# Patient Record
Sex: FEMALE | Race: WHITE | NOT HISPANIC OR LATINO | Employment: FULL TIME | URBAN - METROPOLITAN AREA
[De-identification: names, ages, dates, MRNs, and addresses within clinical notes are randomized per-mention and may not be internally consistent; named-entity substitution may affect disease eponyms.]

---

## 2017-01-03 ENCOUNTER — TELEPHONE (OUTPATIENT)
Dept: MEDICAL GROUP | Facility: PHYSICIAN GROUP | Age: 27
End: 2017-01-03

## 2017-01-03 DIAGNOSIS — Z86.19 HISTORY OF CHICKENPOX: ICD-10-CM

## 2017-01-03 NOTE — TELEPHONE ENCOUNTER
1. Caller Name: Lisa Whaley                      Call Back Number: [phone] (home) [phone] (work)    2. Message: Patient needs titers done for school the letter would not work for school as proof of having the chicken pox    3. Patient approves office to leave a detailed voicemail/MyChart message: N\A

## 2017-01-04 ENCOUNTER — HOSPITAL ENCOUNTER (OUTPATIENT)
Dept: LAB | Facility: MEDICAL CENTER | Age: 27
End: 2017-01-04
Attending: FAMILY MEDICINE
Payer: COMMERCIAL

## 2017-01-04 DIAGNOSIS — D22.9 NUMEROUS MOLES: ICD-10-CM

## 2017-01-04 DIAGNOSIS — Z86.19 HISTORY OF CHICKENPOX: ICD-10-CM

## 2017-01-04 DIAGNOSIS — L91.8 SKIN TAG: ICD-10-CM

## 2017-01-04 DIAGNOSIS — K21.9 GASTROESOPHAGEAL REFLUX DISEASE, ESOPHAGITIS PRESENCE NOT SPECIFIED: ICD-10-CM

## 2017-01-04 DIAGNOSIS — E78.5 DYSLIPIDEMIA: ICD-10-CM

## 2017-01-04 DIAGNOSIS — R73.01 IMPAIRED FASTING BLOOD SUGAR: ICD-10-CM

## 2017-01-04 LAB
ALBUMIN SERPL BCP-MCNC: 4.3 G/DL (ref 3.2–4.9)
ALBUMIN/GLOB SERPL: 1.7 G/DL
ALP SERPL-CCNC: 84 U/L (ref 30–99)
ALT SERPL-CCNC: 29 U/L (ref 2–50)
ANION GAP SERPL CALC-SCNC: 9 MMOL/L (ref 0–11.9)
AST SERPL-CCNC: 14 U/L (ref 12–45)
BASOPHILS # BLD AUTO: 0.05 K/UL (ref 0–0.12)
BASOPHILS NFR BLD AUTO: 0.5 % (ref 0–1.8)
BILIRUB SERPL-MCNC: 0.4 MG/DL (ref 0.1–1.5)
BUN SERPL-MCNC: 9 MG/DL (ref 8–22)
CALCIUM SERPL-MCNC: 9.5 MG/DL (ref 8.5–10.5)
CHLORIDE SERPL-SCNC: 104 MMOL/L (ref 96–112)
CHOLEST SERPL-MCNC: 160 MG/DL (ref 100–199)
CO2 SERPL-SCNC: 24 MMOL/L (ref 20–33)
CREAT SERPL-MCNC: 0.6 MG/DL (ref 0.5–1.4)
EOSINOPHIL # BLD: 0.16 K/UL (ref 0–0.51)
EOSINOPHIL NFR BLD AUTO: 1.4 % (ref 0–6.9)
ERYTHROCYTE [DISTWIDTH] IN BLOOD BY AUTOMATED COUNT: 38.8 FL (ref 35.9–50)
EST. AVERAGE GLUCOSE BLD GHB EST-MCNC: 114 MG/DL
GLOBULIN SER CALC-MCNC: 2.6 G/DL (ref 1.9–3.5)
GLUCOSE SERPL-MCNC: 92 MG/DL (ref 65–99)
HBA1C MFR BLD: 5.6 % (ref 0–5.6)
HCT VFR BLD AUTO: 43.2 % (ref 37–47)
HDLC SERPL-MCNC: 43 MG/DL
HGB BLD-MCNC: 14.1 G/DL (ref 12–16)
IMM GRANULOCYTES # BLD AUTO: 0.07 K/UL (ref 0–0.11)
IMM GRANULOCYTES NFR BLD AUTO: 0.6 % (ref 0–0.9)
LDLC SERPL CALC-MCNC: 96 MG/DL
LYMPHOCYTES # BLD: 3.36 K/UL (ref 1–4.8)
LYMPHOCYTES NFR BLD AUTO: 30.3 % (ref 22–41)
MCH RBC QN AUTO: 28.5 PG (ref 27–33)
MCHC RBC AUTO-ENTMCNC: 32.6 G/DL (ref 33.6–35)
MCV RBC AUTO: 87.4 FL (ref 81.4–97.8)
MONOCYTES # BLD: 0.57 K/UL (ref 0–0.85)
MONOCYTES NFR BLD AUTO: 5.1 % (ref 0–13.4)
NEUTROPHILS # BLD: 6.87 K/UL (ref 2–7.15)
NEUTROPHILS NFR BLD AUTO: 62.1 % (ref 44–72)
NRBC # BLD AUTO: 0 K/UL
NRBC BLD-RTO: 0 /100 WBC
PLATELET # BLD AUTO: 407 K/UL (ref 164–446)
PMV BLD AUTO: 9.4 FL (ref 9–12.9)
POTASSIUM SERPL-SCNC: 3.7 MMOL/L (ref 3.6–5.5)
PROT SERPL-MCNC: 6.9 G/DL (ref 6–8.2)
RBC # BLD AUTO: 4.94 M/UL (ref 4.2–5.4)
SODIUM SERPL-SCNC: 137 MMOL/L (ref 135–145)
TRIGL SERPL-MCNC: 106 MG/DL (ref 0–149)
WBC # BLD AUTO: 11.1 K/UL (ref 4.8–10.8)

## 2017-01-04 PROCEDURE — 80053 COMPREHEN METABOLIC PANEL: CPT

## 2017-01-04 PROCEDURE — 36415 COLL VENOUS BLD VENIPUNCTURE: CPT

## 2017-01-04 PROCEDURE — 85025 COMPLETE CBC W/AUTO DIFF WBC: CPT

## 2017-01-04 PROCEDURE — 80061 LIPID PANEL: CPT

## 2017-01-04 PROCEDURE — 83036 HEMOGLOBIN GLYCOSYLATED A1C: CPT

## 2017-01-04 PROCEDURE — 86787 VARICELLA-ZOSTER ANTIBODY: CPT

## 2017-01-06 LAB — VZV IGG SER IA-ACNC: 2781 IV

## 2017-01-09 DIAGNOSIS — D72.829 LEUKOCYTOSIS, UNSPECIFIED TYPE: ICD-10-CM

## 2017-09-19 ENCOUNTER — HOSPITAL ENCOUNTER (OUTPATIENT)
Dept: LAB | Facility: MEDICAL CENTER | Age: 27
End: 2017-09-19
Payer: COMMERCIAL

## 2017-09-19 LAB
BDY FAT % MEASURED: 47.3 %
BP DIAS: 75 MMHG
BP SYS: 139 MMHG
CHOLEST SERPL-MCNC: 188 MG/DL (ref 100–199)
DIABETES HTDIA: NO
EVENT NAME HTEVT: NORMAL
FASTING HTFAS: YES
GLUCOSE SERPL-MCNC: 89 MG/DL (ref 65–99)
HDLC SERPL-MCNC: 49 MG/DL
HYPERTENSION HTHYP: NO
LDLC SERPL CALC-MCNC: 92 MG/DL
SCREENING LOC CITY HTCIT: NORMAL
SCREENING LOC STATE HTSTA: NORMAL
SCREENING LOCATION HTLOC: NORMAL
SMOKING HTSMO: NO
SUBSCRIBER ID HTSID: NORMAL
TRIGL SERPL-MCNC: 233 MG/DL (ref 0–149)

## 2017-09-19 PROCEDURE — 82947 ASSAY GLUCOSE BLOOD QUANT: CPT

## 2017-09-19 PROCEDURE — 36415 COLL VENOUS BLD VENIPUNCTURE: CPT

## 2017-09-19 PROCEDURE — S5190 WELLNESS ASSESSMENT BY NONPH: HCPCS

## 2017-09-19 PROCEDURE — 80061 LIPID PANEL: CPT

## 2017-09-26 ENCOUNTER — OFFICE VISIT (OUTPATIENT)
Dept: URGENT CARE | Facility: PHYSICIAN GROUP | Age: 27
End: 2017-09-26
Payer: COMMERCIAL

## 2017-09-26 ENCOUNTER — NON-PROVIDER VISIT (OUTPATIENT)
Dept: MEDICAL GROUP | Facility: PHYSICIAN GROUP | Age: 27
End: 2017-09-26
Payer: COMMERCIAL

## 2017-09-26 VITALS
BODY MASS INDEX: 47.98 KG/M2 | SYSTOLIC BLOOD PRESSURE: 138 MMHG | HEART RATE: 107 BPM | OXYGEN SATURATION: 94 % | HEIGHT: 65 IN | RESPIRATION RATE: 16 BRPM | DIASTOLIC BLOOD PRESSURE: 72 MMHG | WEIGHT: 288 LBS | TEMPERATURE: 97.2 F

## 2017-09-26 DIAGNOSIS — Z23 NEED FOR VACCINATION: ICD-10-CM

## 2017-09-26 DIAGNOSIS — H61.21 IMPACTED CERUMEN OF RIGHT EAR: ICD-10-CM

## 2017-09-26 PROCEDURE — 90686 IIV4 VACC NO PRSV 0.5 ML IM: CPT | Performed by: INTERNAL MEDICINE

## 2017-09-26 PROCEDURE — 69210 REMOVE IMPACTED EAR WAX UNI: CPT | Performed by: NURSE PRACTITIONER

## 2017-09-26 PROCEDURE — 99999 PR NO CHARGE: CPT | Performed by: NURSE PRACTITIONER

## 2017-09-26 PROCEDURE — 90471 IMMUNIZATION ADMIN: CPT | Performed by: INTERNAL MEDICINE

## 2017-09-26 ASSESSMENT — PAIN SCALES - GENERAL: PAINLEVEL: NO PAIN

## 2017-09-26 NOTE — PROGRESS NOTES
"Subjective:      Lisa Whaley is a 26 y.o. female who presents with Ear Fullness (Right ear hearing loss, x 2 months, anxiety)            HPI New problem. 26 year old female with hearing loss in right ear x 2 months. She denies dizziness, nausea or vomiting, states some mild pain noted. She had had some cold symptoms prior to this and has tried to clean with qtip. She also has had some episodes of pounding heart rate and she is concerned as healthy tracks revealed high triglycerides.  Allergies, medications and history reviewed by me today      ROS  Please see HPI       Objective:     /72   Pulse (!) 107   Temp 36.2 °C (97.2 °F)   Resp 16   Ht 1.651 m (5' 5\")   Wt (!) 130.6 kg (288 lb)   LMP 09/08/2017   SpO2 94%   Breastfeeding? No   BMI 47.93 kg/m²      Physical Exam   Constitutional: She is oriented to person, place, and time. She appears well-developed and well-nourished. No distress.   HENT:   Head: Normocephalic and atraumatic.   Right Ear: External ear and ear canal normal. Tympanic membrane is not injected and not perforated. No middle ear effusion.   Left Ear: External ear and ear canal normal. Tympanic membrane is not injected and not perforated.  No middle ear effusion.   Nose: No mucosal edema.   Mouth/Throat: No oropharyngeal exudate or posterior oropharyngeal erythema.   Right EAC with cerumen impaction.   Eyes: Conjunctivae are normal. Right eye exhibits no discharge. Left eye exhibits no discharge.   Neck: Normal range of motion. Neck supple.   Cardiovascular: Normal rate, regular rhythm and normal heart sounds.    No murmur heard.  Pulmonary/Chest: Effort normal and breath sounds normal. No respiratory distress.   Musculoskeletal: Normal range of motion.   Normal movement of all 4 extremities.   Lymphadenopathy:     She has no cervical adenopathy.        Right: No supraclavicular adenopathy present.        Left: No supraclavicular adenopathy present.   Neurological: She is " alert and oriented to person, place, and time. Gait normal.   Skin: Skin is warm and dry.   Psychiatric: She has a normal mood and affect. Her behavior is normal. Thought content normal.               Assessment/Plan:     1. Impacted cerumen of right ear  EAR IRRIGATION (MA ONLY)     Irrigated by MA, cerumen removed from eAC by curette by myself.  Follow up as needed.

## 2017-09-26 NOTE — NON-PROVIDER
"Lisa Whaley is a 26 y.o. female here for a non-provider visit for:   FLU    Reason for immunization: Annual Flu Vaccine  Immunization records indicate need for vaccine: Yes, confirmed with Epic  Minimum interval has been met for this vaccine: Yes  ABN completed: Yes    Order and dose verified by: ar  VIS Dated   was given to patient: Yes  All IAC Questionnaire questions were answered \"No.\"    Patient tolerated injection and no adverse effects were observed or reported: Yes    Pt scheduled for next dose in series: Not Indicated     "

## 2017-11-21 ENCOUNTER — NON-PROVIDER VISIT (OUTPATIENT)
Dept: URGENT CARE | Facility: PHYSICIAN GROUP | Age: 27
End: 2017-11-21

## 2017-11-21 DIAGNOSIS — Z11.1 ENCOUNTER FOR PPD TEST: ICD-10-CM

## 2017-11-21 PROCEDURE — 86580 TB INTRADERMAL TEST: CPT | Performed by: FAMILY MEDICINE

## 2017-11-21 NOTE — PROGRESS NOTES
Lisa Whaley is a 27 y.o. female here for a non-provider visit for PPD placement -- Step 1 of 2    Reason for PPD:  work requirement    1. TB evaluation questionnaire completed by patient? Yes      -  If any answers marked yes did you contact a provider prior to placing? Not Indicated  2.  Patient notified to return to clinic for reading on: 11/23/17-11/24/17  3.  PPD Placement documentation completed on TB evaluation questionnaire? Yes  4.  Location of TB evaluation questionnaire filed:

## 2017-11-24 ENCOUNTER — NON-PROVIDER VISIT (OUTPATIENT)
Dept: URGENT CARE | Facility: PHYSICIAN GROUP | Age: 27
End: 2017-11-24
Payer: COMMERCIAL

## 2017-11-24 LAB — TB WHEAL 3D P 5 TU DIAM: NORMAL MM

## 2018-06-14 ENCOUNTER — HOSPITAL ENCOUNTER (OUTPATIENT)
Facility: MEDICAL CENTER | Age: 28
End: 2018-06-14
Attending: FAMILY MEDICINE
Payer: COMMERCIAL

## 2018-06-14 ENCOUNTER — OFFICE VISIT (OUTPATIENT)
Dept: MEDICAL GROUP | Facility: PHYSICIAN GROUP | Age: 28
End: 2018-06-14
Payer: COMMERCIAL

## 2018-06-14 VITALS
BODY MASS INDEX: 47.2 KG/M2 | TEMPERATURE: 97.9 F | OXYGEN SATURATION: 93 % | HEART RATE: 84 BPM | DIASTOLIC BLOOD PRESSURE: 60 MMHG | WEIGHT: 283.29 LBS | SYSTOLIC BLOOD PRESSURE: 110 MMHG | HEIGHT: 65 IN

## 2018-06-14 DIAGNOSIS — Z00.00 ROUTINE PHYSICAL EXAMINATION: ICD-10-CM

## 2018-06-14 DIAGNOSIS — Z01.419 ENCOUNTER FOR ROUTINE GYNECOLOGICAL EXAMINATION WITH PAPANICOLAOU SMEAR OF CERVIX: ICD-10-CM

## 2018-06-14 DIAGNOSIS — E66.01 MORBID OBESITY WITH BMI OF 45.0-49.9, ADULT (HCC): ICD-10-CM

## 2018-06-14 LAB — CYTOLOGY REG CYTOL: NORMAL

## 2018-06-14 PROCEDURE — 99000 SPECIMEN HANDLING OFFICE-LAB: CPT | Performed by: FAMILY MEDICINE

## 2018-06-14 PROCEDURE — 88175 CYTOPATH C/V AUTO FLUID REDO: CPT

## 2018-06-14 PROCEDURE — 99395 PREV VISIT EST AGE 18-39: CPT | Performed by: FAMILY MEDICINE

## 2018-06-14 ASSESSMENT — PATIENT HEALTH QUESTIONNAIRE - PHQ9: CLINICAL INTERPRETATION OF PHQ2 SCORE: 0

## 2018-06-15 NOTE — PROGRESS NOTES
"Subjective:      Lisa Whaley is a 27 y.o. female who presents with Gynecologic Exam (pap)            HPI     Patient is here for routine GYN exam/Pap smear and physical exam.    Her last Pap smear in  and was normal. No history of abnormal Pap smears. She had Chlamydia years ago that was treated. She has been with the same partner for several years. She is  zero para zero. LMP 18. Last flu shot was in 2017. Last tdap was in .    I reviewed the following    Past Medical History:   Diagnosis Date   • Dysuria 2014   • No significant past medical history         History reviewed. No pertinent surgical history.    Allergies   Allergen Reactions   • Vicodin [Hydrocodone-Acetaminophen]        No current outpatient prescriptions on file.     No current facility-administered medications for this visit.         Family History   Problem Relation Age of Onset   • No Known Problems Mother    • Hypertension Father    • GI Father      GERD       Social History     Social History   • Marital status:      Spouse name: N/A   • Number of children: 0   • Years of education: N/A     Occupational History   • retail      Social History Main Topics   • Smoking status: Current Every Day Smoker     Packs/day: 0.10     Years: 1.00     Types: Cigarettes   • Smokeless tobacco: Never Used      Comment: previously occas, 2 cig/day x 1 yr   • Alcohol use 0.0 oz/week      Comment: occasional   • Drug use: No   • Sexual activity: Yes     Partners: Male     Other Topics Concern   • Not on file     Social History Narrative   • No narrative on file        ROS     As per history of present illness, the rest are negative.       Objective:     /60   Pulse 84   Temp 36.6 °C (97.9 °F)   Ht 1.651 m (5' 5\")   Wt (!) 128.5 kg (283 lb 4.7 oz)   SpO2 93%   BMI 47.14 kg/m²      Physical Exam   Constitutional: She is oriented to person, place, and time. She appears well-developed and well-nourished. " No distress.   HENT:   Head: Normocephalic and atraumatic.   Right Ear: External ear normal.   Left Ear: External ear normal.   Nose: Nose normal.   Mouth/Throat: Oropharynx is clear and moist. No oropharyngeal exudate.   Eyes: Conjunctivae and EOM are normal. Pupils are equal, round, and reactive to light. Right eye exhibits no discharge. Left eye exhibits no discharge. No scleral icterus.   Neck: Normal range of motion. Neck supple. No tracheal deviation present. No thyromegaly present.   Cardiovascular: Normal rate, regular rhythm and normal heart sounds.  Exam reveals no gallop.    No murmur heard.  Pulmonary/Chest: Effort normal and breath sounds normal. No stridor. No respiratory distress. She has no wheezes. She has no rales. Right breast exhibits no inverted nipple, no mass, no nipple discharge, no skin change and no tenderness. Left breast exhibits no inverted nipple, no mass, no nipple discharge, no skin change and no tenderness. Breasts are symmetrical.   Abdominal: Soft. Bowel sounds are normal. She exhibits no distension and no mass. There is no tenderness. There is no rebound and no guarding. Hernia confirmed negative in the right inguinal area and confirmed negative in the left inguinal area.   Genitourinary: Vagina normal and uterus normal. No breast tenderness, discharge or bleeding. Pelvic exam was performed with patient supine. No labial fusion. There is no rash, tenderness, lesion or injury on the right labia. There is no rash, tenderness, lesion or injury on the left labia. Uterus is not deviated, not enlarged, not fixed and not tender. Cervix exhibits no motion tenderness, no discharge and no friability. Right adnexum displays no mass, no tenderness and no fullness. Left adnexum displays no mass, no tenderness and no fullness. No erythema, tenderness or bleeding in the vagina. No foreign body in the vagina. No signs of injury around the vagina. No vaginal discharge found.   Musculoskeletal:  Normal range of motion. She exhibits no edema or tenderness.   Lymphadenopathy:     She has no cervical adenopathy.        Right: No inguinal adenopathy present.        Left: No inguinal adenopathy present.   Neurological: She is alert and oriented to person, place, and time. She displays normal reflexes. No cranial nerve deficit. She exhibits normal muscle tone. Coordination normal.   Skin: Skin is warm. No rash noted. She is not diaphoretic. No erythema. No pallor.   Psychiatric: She has a normal mood and affect. Her behavior is normal. Thought content normal.    Cervix very posteriorly located and hard to visualize.                Assessment/Plan:     1. Encounter for routine gynecological examination with Papanicolaou smear of cervix  Complete exam was done. Pap smear was done. Specimen was packaged and sent to the lab.  - THINPREP PAP ONLY; Future    2. Routine physical examination  We will do screening labs will be done together with her biometrics/healthy tracks.  - CBC WITH DIFFERENTIAL; Future  - COMP METABOLIC PANEL; Future    3. Morbid obesity with BMI of 45.0-49.9, adult ([facility])  Discussed diet, exercise and weight loss. Offered referral to our physicist and managed weight loss program and patient agrees to proceed. Referral placed. We will get blood work in addition to the biometrics that she will do through her work.  - Patient identified as having weight management issue.  Appropriate orders and counseling given.  - REFERRAL TO [facility] IMPROVEMENT PROGRAMS (HIP) Services Requested: Weight Management Program, Physician Medical Weight Management Program; Reason for Referral? BMI>30; Reason for Visit: Overweight/Obesity; Future  - CBC WITH DIFFERENTIAL; Future  - COMP METABOLIC PANEL; Future    Please note that this dictation was created using voice recognition software. I have worked with consultants from the vendor as well as technical experts from [employer] to optimize the interface. I have  made every reasonable attempt to correct obvious errors, but I expect that there are errors of grammar and possibly content I did not discover before finalizing the note.

## 2018-06-21 ENCOUNTER — TELEPHONE (OUTPATIENT)
Dept: MEDICAL GROUP | Facility: PHYSICIAN GROUP | Age: 28
End: 2018-06-21

## 2018-06-21 NOTE — TELEPHONE ENCOUNTER
----- Message from Shikha Saul M.D. sent at 6/20/2018 11:55 AM PDT -----  Please call patient. Pap smear came back atypical cells. These are not the normal cells that we see in the Pap smear. This is something that we need to recheck in 6 months to make sure that they don't persist or they don't progress to precancerous cells. She needs to make an appointment in 6 months for a repeat Pap smear.

## 2018-07-12 ENCOUNTER — HOSPITAL ENCOUNTER (OUTPATIENT)
Dept: LAB | Facility: MEDICAL CENTER | Age: 28
End: 2018-07-12
Attending: FAMILY MEDICINE
Payer: COMMERCIAL

## 2018-07-12 DIAGNOSIS — E66.01 MORBID OBESITY WITH BMI OF 45.0-49.9, ADULT (HCC): ICD-10-CM

## 2018-07-12 DIAGNOSIS — Z00.00 ROUTINE PHYSICAL EXAMINATION: ICD-10-CM

## 2018-07-12 LAB
ALBUMIN SERPL BCP-MCNC: 4.3 G/DL (ref 3.2–4.9)
ALBUMIN/GLOB SERPL: 1.7 G/DL
ALP SERPL-CCNC: 86 U/L (ref 30–99)
ALT SERPL-CCNC: 19 U/L (ref 2–50)
ANION GAP SERPL CALC-SCNC: 11 MMOL/L (ref 0–11.9)
AST SERPL-CCNC: 16 U/L (ref 12–45)
BASOPHILS # BLD AUTO: 0.6 % (ref 0–1.8)
BASOPHILS # BLD: 0.06 K/UL (ref 0–0.12)
BDY FAT % MEASURED: 45.9 %
BILIRUB SERPL-MCNC: 0.5 MG/DL (ref 0.1–1.5)
BP DIAS: 70 MMHG
BP SYS: 138 MMHG
BUN SERPL-MCNC: 7 MG/DL (ref 8–22)
CALCIUM SERPL-MCNC: 9.5 MG/DL (ref 8.5–10.5)
CHLORIDE SERPL-SCNC: 104 MMOL/L (ref 96–112)
CHOLEST SERPL-MCNC: 184 MG/DL (ref 100–199)
CO2 SERPL-SCNC: 22 MMOL/L (ref 20–33)
CREAT SERPL-MCNC: 0.67 MG/DL (ref 0.5–1.4)
DIABETES HTDIA: NO
EOSINOPHIL # BLD AUTO: 0.15 K/UL (ref 0–0.51)
EOSINOPHIL NFR BLD: 1.6 % (ref 0–6.9)
ERYTHROCYTE [DISTWIDTH] IN BLOOD BY AUTOMATED COUNT: 39 FL (ref 35.9–50)
EVENT NAME HTEVT: NORMAL
FASTING HTFAS: YES
GLOBULIN SER CALC-MCNC: 2.6 G/DL (ref 1.9–3.5)
GLUCOSE SERPL-MCNC: 90 MG/DL (ref 65–99)
GLUCOSE SERPL-MCNC: 93 MG/DL (ref 65–99)
HCT VFR BLD AUTO: 44.1 % (ref 37–47)
HDLC SERPL-MCNC: 52 MG/DL
HGB BLD-MCNC: 14.4 G/DL (ref 12–16)
HYPERTENSION HTHYP: NO
IMM GRANULOCYTES # BLD AUTO: 0.04 K/UL (ref 0–0.11)
IMM GRANULOCYTES NFR BLD AUTO: 0.4 % (ref 0–0.9)
LDLC SERPL CALC-MCNC: 107 MG/DL
LYMPHOCYTES # BLD AUTO: 3.4 K/UL (ref 1–4.8)
LYMPHOCYTES NFR BLD: 35.4 % (ref 22–41)
MCH RBC QN AUTO: 28.9 PG (ref 27–33)
MCHC RBC AUTO-ENTMCNC: 32.7 G/DL (ref 33.6–35)
MCV RBC AUTO: 88.4 FL (ref 81.4–97.8)
MONOCYTES # BLD AUTO: 0.51 K/UL (ref 0–0.85)
MONOCYTES NFR BLD AUTO: 5.3 % (ref 0–13.4)
NEUTROPHILS # BLD AUTO: 5.45 K/UL (ref 2–7.15)
NEUTROPHILS NFR BLD: 56.7 % (ref 44–72)
NRBC # BLD AUTO: 0 K/UL
NRBC BLD-RTO: 0 /100 WBC
PLATELET # BLD AUTO: 390 K/UL (ref 164–446)
PMV BLD AUTO: 9.7 FL (ref 9–12.9)
POTASSIUM SERPL-SCNC: 3.8 MMOL/L (ref 3.6–5.5)
PROT SERPL-MCNC: 6.9 G/DL (ref 6–8.2)
RBC # BLD AUTO: 4.99 M/UL (ref 4.2–5.4)
SCREENING LOC CITY HTCIT: NORMAL
SCREENING LOC STATE HTSTA: NORMAL
SCREENING LOCATION HTLOC: NORMAL
SMOKING HTSMO: NO
SODIUM SERPL-SCNC: 137 MMOL/L (ref 135–145)
SUBSCRIBER ID HTSID: NORMAL
TRIGL SERPL-MCNC: 125 MG/DL (ref 0–149)
WBC # BLD AUTO: 9.6 K/UL (ref 4.8–10.8)

## 2018-07-12 PROCEDURE — S5190 WELLNESS ASSESSMENT BY NONPH: HCPCS

## 2018-07-12 PROCEDURE — 80061 LIPID PANEL: CPT

## 2018-07-12 PROCEDURE — 80053 COMPREHEN METABOLIC PANEL: CPT

## 2018-07-12 PROCEDURE — 82947 ASSAY GLUCOSE BLOOD QUANT: CPT

## 2018-07-12 PROCEDURE — 36415 COLL VENOUS BLD VENIPUNCTURE: CPT

## 2018-07-12 PROCEDURE — 85025 COMPLETE CBC W/AUTO DIFF WBC: CPT

## 2018-08-29 ENCOUNTER — OFFICE VISIT (OUTPATIENT)
Dept: HEALTH INFORMATION MANAGEMENT | Facility: MEDICAL CENTER | Age: 28
End: 2018-08-29
Payer: COMMERCIAL

## 2018-08-29 VITALS
DIASTOLIC BLOOD PRESSURE: 78 MMHG | HEIGHT: 66 IN | OXYGEN SATURATION: 97 % | SYSTOLIC BLOOD PRESSURE: 128 MMHG | WEIGHT: 274 LBS | BODY MASS INDEX: 44.03 KG/M2 | HEART RATE: 98 BPM

## 2018-08-29 DIAGNOSIS — R53.82 CHRONIC FATIGUE: ICD-10-CM

## 2018-08-29 DIAGNOSIS — E78.00 HYPERCHOLESTEROLEMIA: ICD-10-CM

## 2018-08-29 DIAGNOSIS — G47.9 SLEEPING DIFFICULTY: ICD-10-CM

## 2018-08-29 DIAGNOSIS — E66.01 MORBID OBESITY WITH BMI OF 45.0-49.9, ADULT (HCC): ICD-10-CM

## 2018-08-29 PROCEDURE — 99204 OFFICE O/P NEW MOD 45 MIN: CPT | Mod: 25 | Performed by: INTERNAL MEDICINE

## 2018-08-29 PROCEDURE — 93000 ELECTROCARDIOGRAM COMPLETE: CPT | Performed by: INTERNAL MEDICINE

## 2018-08-29 PROCEDURE — 97802 MEDICAL NUTRITION INDIV IN: CPT | Performed by: DIETITIAN, REGISTERED

## 2018-08-29 NOTE — PROGRESS NOTES
"8/29/2018   Referring Provider: Shikha Saul M.D.       Time in/out: 9:52-10:24am     Anthropometrics/Objective  Vitals:    08/29/18 0908   BP: 128/78   Weight: 124.3 kg (274 lb)   Height: 1.676 m (5' 6\")     BMI: Body mass index is 44.22 kg/m².  Stated Goal Weight: <200lb initially   See comprehensive patient history form for further information     Subjective:  Pt is here today for the initial screening visit for the medical weight management program.   -Pt has been eating healthy and using food journal francisco since April and has lost 30lb.   -Her weight has started to plateau and she wants to know what she can do to lose more weight   -Has used My fitness pal and has her goal set at 1800kcal per day. Was averaging 1400kcal until the last month or so, has been averaging 1600-2000kcal per day  -She often skips breakfast.   -Works 2pm-9pm ; wakes up at 1pm.   -Drinks 3 diet sodas / drinks a day   See Medical Questionnaire for more detailed diet history     Nutrition Diagnosis (PES Statement)  · Obesity related to excessive energy intake and inadequate energy expenditure as evidenced by BMI >30     Client history:  Condition(s) associated with a diagnosis or treatment (specify) Obesity     Biochemical data, medical test and procedures  Lab Results   Component Value Date/Time    HBA1C 5.6 01/04/2017 12:42 PM   @  No results found for: POCGLUCOSE  Lab Results   Component Value Date/Time    CHOLSTRLTOT 184 07/12/2018 01:56 PM     (H) 07/12/2018 01:56 PM    HDL 52 07/12/2018 01:56 PM    TRIGLYCERIDE 125 07/12/2018 01:56 PM         Nutrition Intervention  Nutrition Prescription  Recommended Daily Kcals: 7355-1112 Kcal based on REE of 2300 w/ consideration for current calorie intake  Recommended Daily Protein: >100g per Dr. Parrish      Meal Plan Recommendation   High Protein diet with 1 meal replacements per day   1 Robard shake for breakfast daily   1-2 snacks per day: 1-2oz protein + ns veggies     Meal times: " 1:30pm,  6-7pm,  & 11pm   Snacks: 4pm     Comprehensive Nutrition Education Instruction or training leading to in-depth nutrition related knowledge about:  Benefits to following meal plan, Combine carb, protein and fat at each meal, Meal timing and spacing, Portion control, Sweets and alcohol in moderation and gave pt Meals ideas handout since she wont be back for 1 month.   Handouts provided regarding topics discussed     Monitoring & Evaluation Plan    Behavioral-Environmental:  Behavior: Keep a food journal and bring to next appointment - my fitness pal   Physical activity: continue to exercise 3 days per week     Food / Nutrient Intake:  Food intake: Follow meal plan as discussed. Avoid concentrated sweets and processed carbs. Use the plate method for portion control and macronutrient balance. Limit carbs/starch to up to 1/2 cup per meal. Snacks should be 1oz protein + ns veggies.     Fluid intake: Consume at least 64 oz water per day. Avoid all sweetened beverages. Limit coffee to 1 cup a day (ideally no cream or sugar). Avoid alcohol. Limit diet beverage to 1 per day     Physical Signs / Symptoms:  Weight change : -1-2lb per week; up to 5% in 1 month per Dr. Parrish       Assessment Notes:  Lisa has been doing well with a healthy eating plan, however, she would benefit from not skipping breakfast, and reducing her intake of diet sodas. She will continue to track on my fitness pal. Her current calorie intake is promoting more rapid weight loss, however she feels it is sustainable. Recommended she does not focus primarily on calories (1800kcal being her goal average intake) but additionally on nutrient quality and macronutrient intake. Dr. Parrish would like her to limit carbs to <100g per day.  Would review her food journal francisco next appt / discuss diet hx. Would review nutrition basics as well in more detail.     Follow up 4 weeks with NORAH, 8 weeks with MD and NORAH

## 2018-08-29 NOTE — PROGRESS NOTES
Bariatric Medicine H&P  Chief Complaint   Patient presents with   • Weight Gain       Referred by:  Shikha Saul M.D.    History of Present Illness:   Lisa Whaley is a 27 y.o.  female who presents for weight management and to help address co-morbidities related to overweight, including HLD.    The patient would like to lose weight to be healthy.  She was referred by her primary care physician to lose weight, as she is tried weight watchers in the past with no results.  Has not been in a formal weight loss program otherwise.  Has not been on weight loss medications in the past.  Has kept a food diary, helped her reach her weight loss goals.  Likes to use my fitness pal.    The patient is confused about what foods to eat, what to avoid and which diet to be on.  She was told about a ketogenic diet, worried about too many fats.  Recently increased her protein intake, lost 10 pounds in the last month.  Not really feeling hungry most times or binge eating.    Typically has nothing for breakfast, snacks between meals on fig bars and chips.  For lunch she has a sandwich or salad, for dinner chicken with salad and veggies.  After dinner she also snacks on chips and fig bars.  She has large portion sizes.  3 diet sodas per day, drinks a lot of water as well.    Tracking on my fitness pal.  Her goal intake is 1800 kcal per day, ranges between 5711-9938 kcal per day, recently had been closer to 2000 jose/day with travel.  Total  g, total protein 100 g/day.    Not on medication.  LDL slightly elevated on previous testing, but has not been on a statin.  Admits her sleep is poor, often fatigued during the day.  Would be interested in a sleep study .    Behavior-Related History:  Binge eating screen:  negative  H/o abuse:  no     Exercise:   Cardio, walking, wts 2-3 times per wk, 45 minutes to 60 minutes each     Review of Systems   Positive for fatigue, intermittent palpitations.  Snores while  "asleep.  Some heartburn at times.  Anxiety.  Sleep apnea screen: Positive.  Has not had a sleep study.  All other ROS were reviewed with patient today and are negative.      PMH/PSH:  I have reviewed the patient's medical, social and family history, allergies, and medications today.  Prior records reviewed.  Personal Hx of Bariatric Surgery: Negative  Works at the [employer], 2-9p    Physical Exam:   /78   Pulse 98   Ht 1.676 m (5' 6\")   Wt 124.3 kg (274 lb)   SpO2 97%   BMI 44.22 kg/m²   Waist:  47 in  Body fat %  53  REE  2330 kcal/day    Constitutional: Oriented to person, place, and time and well-developed, well-nourished, and in no distress.    HENT: No facial plethora.  No Cushingoid features.  No scalloped tongue.  No dental erosions.  No swollen parotids.  Head: Normocephalic.   Eyes: EOM are normal. Pupils are equal, round, and reactive to light. No periorbital edema.  No lateral thinning of eyebrows.  No vertical nystagmus.  Neck: Normal range of motion. Neck supple. No thyromegaly present. No buffalo hump.  Cardiovascular: Normal rate and regular rhythm.  No murmur heard.  Pulmonary/Chest: Effort normal and breath sounds normal. No wheezes.   Abdominal: Soft. Bowel sounds are normal. No pannus.  No ascites.  No hepatosplenomegaly.  No red striae.  Musculoskeletal: Normal range of motion. No edema.   Neurological: Alert and oriented to person, place, and time. Normal reflexes. No cranial nerve deficit. No muscle weakness.  Gait normal.   Skin: Warm and dry. Not diaphoretic. No hirsuitism.  Subtle  neck acanthosis nigricans.  Not excessively dry, scaly.  No acne.  No bruising/ecchymosis.   Numerous acrochordon on neck, chest, back.  No violaceous striae.    Psychiatric: Mood, memory, affect and judgment normal.     Laboratory:   Prior labs reviewed.  EKG: Normal sinus rhythm, rate 64, no acute ST-T elevations or depressions.  Corrected QT 0.381  Ordered and reviewed by me today.    Dietitian " Assessment: I have reviewed the Dietitian's assessment related to this encounter.       ASSESSMENT/PLAN:  Body mass index is 44.22 kg/m².   Obesity Stage ([place]) 1; Class 3    1. Morbid obesity with BMI of 45.0-49.9, adult ([facility]CC)  VITAMIN D 25-HYDROXY    TSH WITH REFLEX TO FT4    EKG    REFERRAL TO PULMONOLOGY   2. Hypercholesterolemia  VITAMIN D 25-HYDROXY    TSH WITH REFLEX TO FT4    EKG    REFERRAL TO PULMONOLOGY   3. Sleeping difficulty  VITAMIN D 25-HYDROXY    TSH WITH REFLEX TO FT4    EKG    REFERRAL TO PULMONOLOGY   4. Chronic fatigue  VITAMIN D 25-HYDROXY    TSH WITH REFLEX TO FT4    EKG    REFERRAL TO PULMONOLOGY     The patient has begun to make some positive changes, is tracking her intake, increasing her protein intake.  Would benefit from maintaining her total kcal goal, reducing her CHO intake.  This should help with weight loss, improve her LDL, sleep.  Referred for sleep study, as patient at risk for sleep apnea.    The patient and I have discussed at length and agree to the following recommendations, which are all addressing the above diagnoses:    Weight Goal: 5% wt loss at one month after start (pt goal weight is 170-200 lb)  Diet:   High Protein/Low Carb Meals and 2 snacks between meals daily  Avoid skipping breakfast  Consider Premier protein or Robard meal replacement shake or bars for breakfast daily  >100 g protein, <100 g total carbs daily  64+ oz water per day  Avoid sweet drinks and sodas; reduce diet soda intake from 3 to 1/day to start  Track daily intake with my fitness pal, as she is doing  Physical Activity: Continue walking, cardio, weight lifting 3 times per week times 60 minutes  Risk level for moderate/vigorous exercise program:   Low  New Rx:   None.  Consider weight loss medication pending course.  Behavior change:   Mindful eating, planning, tracking, avoid meal skipping  Follow-up: 2-month  1 month with RD      Patient's body mass index is 44.22 kg/m². Exercise and nutrition  counseling were performed at this visit.        Thank you for your referral!

## 2018-09-26 ENCOUNTER — IMMUNIZATION (OUTPATIENT)
Dept: OCCUPATIONAL MEDICINE | Facility: CLINIC | Age: 28
End: 2018-09-26

## 2018-09-26 DIAGNOSIS — Z23 NEED FOR VACCINATION: ICD-10-CM

## 2018-09-29 ENCOUNTER — HOSPITAL ENCOUNTER (OUTPATIENT)
Dept: LAB | Facility: MEDICAL CENTER | Age: 28
End: 2018-09-29
Attending: INTERNAL MEDICINE
Payer: COMMERCIAL

## 2018-09-29 ENCOUNTER — OFFICE VISIT (OUTPATIENT)
Dept: URGENT CARE | Facility: PHYSICIAN GROUP | Age: 28
End: 2018-09-29
Payer: COMMERCIAL

## 2018-09-29 VITALS
OXYGEN SATURATION: 95 % | SYSTOLIC BLOOD PRESSURE: 120 MMHG | TEMPERATURE: 97.6 F | HEART RATE: 86 BPM | RESPIRATION RATE: 14 BRPM | BODY MASS INDEX: 43.39 KG/M2 | HEIGHT: 66 IN | WEIGHT: 270 LBS | DIASTOLIC BLOOD PRESSURE: 74 MMHG

## 2018-09-29 DIAGNOSIS — R53.82 CHRONIC FATIGUE: ICD-10-CM

## 2018-09-29 DIAGNOSIS — E78.00 HYPERCHOLESTEROLEMIA: ICD-10-CM

## 2018-09-29 DIAGNOSIS — G44.209 TENSION-TYPE HEADACHE, NOT INTRACTABLE, UNSPECIFIED CHRONICITY PATTERN: ICD-10-CM

## 2018-09-29 DIAGNOSIS — E66.01 MORBID OBESITY WITH BMI OF 45.0-49.9, ADULT (HCC): ICD-10-CM

## 2018-09-29 DIAGNOSIS — G47.9 SLEEPING DIFFICULTY: ICD-10-CM

## 2018-09-29 LAB
25(OH)D3 SERPL-MCNC: 14 NG/ML (ref 30–100)
TSH SERPL DL<=0.005 MIU/L-ACNC: 1.34 UIU/ML (ref 0.38–5.33)

## 2018-09-29 PROCEDURE — 99214 OFFICE O/P EST MOD 30 MIN: CPT | Performed by: FAMILY MEDICINE

## 2018-09-29 PROCEDURE — 82306 VITAMIN D 25 HYDROXY: CPT

## 2018-09-29 PROCEDURE — 84443 ASSAY THYROID STIM HORMONE: CPT

## 2018-09-29 PROCEDURE — 36415 COLL VENOUS BLD VENIPUNCTURE: CPT

## 2018-09-29 RX ORDER — KETOROLAC TROMETHAMINE 30 MG/ML
60 INJECTION, SOLUTION INTRAMUSCULAR; INTRAVENOUS ONCE
Status: COMPLETED | OUTPATIENT
Start: 2018-09-29 | End: 2018-09-29

## 2018-09-29 RX ORDER — BUTALBITAL, ACETAMINOPHEN AND CAFFEINE 50; 325; 40 MG/1; MG/1; MG/1
1 TABLET ORAL EVERY 6 HOURS PRN
Qty: 10 TAB | Refills: 0 | Status: SHIPPED | OUTPATIENT
Start: 2018-09-29 | End: 2018-10-06

## 2018-09-29 RX ADMIN — KETOROLAC TROMETHAMINE 60 MG: 30 INJECTION, SOLUTION INTRAMUSCULAR; INTRAVENOUS at 12:08

## 2018-09-29 NOTE — PROGRESS NOTES
"    Migraine   This is a new problem. The current episode started in the past 7 days. The problem occurs intermittently. The problem has been unchanged. The pain is located in the occipital region.  The pain does not radiate. The pain quality is similar to prior headaches. The quality of the pain is described as sharp. she denies any:  dizziness, nausea, phonophobia or photophobia. Pertinent negatives include no abdominal pain or fever. The symptoms are aggravated by activity. Patient has tried acetaminophen for the symptoms. The treatment provided no relief.  past medical history is significant for migraine headaches.       Social History   Substance Use Topics   • Smoking status: Current Every Day Smoker     Packs/day: 0.10     Years: 1.00     Types: Cigarettes   • Smokeless tobacco: Never Used      Comment: previously occas, 2 cig/day x 1 yr   • Alcohol use 0.0 oz/week      Comment: occasional           Past Medical History:   Diagnosis Date   • Dysuria 5/1/2014   • No significant past medical history            Review of Systems   Constitutional: Negative for fever and chills.   Eyes: no vision changes, d/c  Respiratory: Negative for shortness of breath.    Cardiovascular: Negative for chest pain and palpitations.   Gastrointestinal:  Negative for abdominal pain, n/v.   Skin: Negative for rash.   Neurological: Positive for   headaches.   Psychiatric/Behavioral: The patient is not nervous/anxious.    All other systems reviewed and are negative.         Objective:     Blood pressure 120/74, pulse 86, temperature 36.4 °C (97.6 °F), temperature source Temporal, resp. rate 14, height 1.676 m (5' 6\"), weight 122.5 kg (270 lb), SpO2 95 %, not currently breastfeeding.    Physical Exam   Constitutional: pt is oriented to person, place, and time.  Pt appears well-developed and well-nourished. No distress.   HENT:   Head: Normocephalic and atraumatic.   Mouth/Throat: Oropharynx is clear and moist. No oropharyngeal exudate. "   Eyes: Conjunctivae and EOM are normal. Pupils are equal, round, and reactive to light. Right eye exhibits no discharge. Left eye exhibits no discharge. No scleral icterus.   Neck: Neck supple.   Cardiovascular: Normal rate and regular rhythm.    Pulmonary/Chest: Effort normal.   Lymphadenopathy:     Pt has no cervical adenopathy.   Neurologic: Alert and oriented. Cranial nerves II-XII intact, EOMs intact, no tongue deviation, PERRL, no facial asymmetry to motor or sensation, symmetric palate, normal finger-to-nose test, no pronator drift. No focal motor deficits. Symmetric reflexes. Normal station and gait, normal tandem walk. Coordination normal.   Skin: Skin is warm. Pt is not diaphoretic. No erythema. No pallor.   Psychiatric:  behavior is normal.   Nursing note and vitals reviewed.              Assessment/Plan:         1. Tension-type headache, not intractable, unspecified chronicity pattern  Pain improved after toradol    - ketorolac (TORADOL) injection 60 mg; 2 mL by Intramuscular route Once.  - CT-HEAD W/O; Future  - acetaminophen/caffeine/butalbital 325-40-50 mg (FIORICET) -40 MG Tab; Take 1 Tab by mouth every 6 hours as needed for Headache for up to 7 days.  Dispense: 10 Tab; Refill: 0        Follow up in one week if no improvement, sooner if symptoms worsen.

## 2018-09-29 NOTE — LETTER
September 29, 2018         Patient: Lisa Whaley   YOB: 1990   Date of Visit: 9/29/2018           To Whom it May Concern:    Lisa Whaley was seen in my clinic on 9/29/2018. Please excuse her 09/29/2018-09/30/18 .    If you have any questions or concerns, please don't hesitate to call.        Sincerely,           Anant Hackett M.D.  Electronically Signed

## 2018-09-30 ENCOUNTER — HOSPITAL ENCOUNTER (OUTPATIENT)
Dept: RADIOLOGY | Facility: MEDICAL CENTER | Age: 28
End: 2018-09-30
Attending: FAMILY MEDICINE
Payer: COMMERCIAL

## 2018-09-30 ENCOUNTER — TELEPHONE (OUTPATIENT)
Dept: URGENT CARE | Facility: PHYSICIAN GROUP | Age: 28
End: 2018-09-30

## 2018-09-30 DIAGNOSIS — G44.209 TENSION-TYPE HEADACHE, NOT INTRACTABLE, UNSPECIFIED CHRONICITY PATTERN: ICD-10-CM

## 2018-09-30 PROCEDURE — 70450 CT HEAD/BRAIN W/O DYE: CPT

## 2018-10-01 ENCOUNTER — NON-PROVIDER VISIT (OUTPATIENT)
Dept: HEALTH INFORMATION MANAGEMENT | Facility: MEDICAL CENTER | Age: 28
End: 2018-10-01
Payer: COMMERCIAL

## 2018-10-01 VITALS — HEIGHT: 66 IN | WEIGHT: 275 LBS | BODY MASS INDEX: 44.2 KG/M2

## 2018-10-01 DIAGNOSIS — E66.01 MORBID OBESITY WITH BMI OF 45.0-49.9, ADULT (HCC): ICD-10-CM

## 2018-10-01 PROBLEM — E55.9 VITAMIN D DEFICIENCY: Status: ACTIVE | Noted: 2018-10-01

## 2018-10-01 PROCEDURE — 97803 MED NUTRITION INDIV SUBSEQ: CPT | Performed by: DIETITIAN, REGISTERED

## 2018-10-01 NOTE — PROGRESS NOTES
"Nutrition Reassess: Medical Weight Management   Today's date: 10/1/2018  Referring Provider: No ref. provider found      Time: in/out 1:00-1:25pm   Visit#: 2    Subjective:  Pt states she has not been following the meal plan guidelines consistently.   Has eaten out for dinners multiple times for her bday and her friends bday.   Has not been cooking dinners.   Used the MR for a couple weeks for breakfast and found that was helpful   Likes using the snack bars as well  Works nights. 2pm-9pm.   Has however started working out at the gym 5days per week    Used [employer] pal for a few days then stopped . States that <100g carbs was too difficult and not sustainable for her right now.       Anthropometrics/Objective  Today's weight:    Vitals:    10/01/18 1431   Weight: 124.7 kg (275 lb)   Height: 1.676 m (5' 6\")     BMI:  Body mass index is 44.39 kg/m².  Starting weight/date 274lb     Total weight change : +1lb            Meal Plan:   1800-2000kcal with 1 meal replacements per day   <150g carbs per day  >100g protein per day      ReAssesment/Notes:  Lisa has done well with increasing her exercise, however still needs to work on her diet changes and being more consistent. SHe wants to start meal prepping and cooking meals for the week on sundays. Reviewed meal ideas today she can try. Also suggested meal planning if prepping ahead doesn't work well for her. Pt will continue 1 MR a day for breakfast and 1 snack a day (bar or 1oz protein + ns veggies) around 4pm.   She was encouraged to do a few days on her Nit[employer] pal francisco to review next appt.  Since <100g was not realistic for her, am ok with aiming for <150g per day to start with. And then we can adjust from there as needed. This will still be less than she was previously consuming.     Follow-up: 4 weeks with MD and RD    "

## 2018-10-06 PROCEDURE — 90686 IIV4 VACC NO PRSV 0.5 ML IM: CPT | Performed by: PREVENTIVE MEDICINE

## 2018-10-29 ENCOUNTER — OFFICE VISIT (OUTPATIENT)
Dept: HEALTH INFORMATION MANAGEMENT | Facility: MEDICAL CENTER | Age: 28
End: 2018-10-29
Payer: COMMERCIAL

## 2018-10-29 VITALS
HEIGHT: 66 IN | WEIGHT: 270.3 LBS | HEART RATE: 89 BPM | DIASTOLIC BLOOD PRESSURE: 80 MMHG | BODY MASS INDEX: 43.44 KG/M2 | OXYGEN SATURATION: 94 % | SYSTOLIC BLOOD PRESSURE: 128 MMHG

## 2018-10-29 DIAGNOSIS — E55.9 VITAMIN D DEFICIENCY: ICD-10-CM

## 2018-10-29 DIAGNOSIS — E66.01 MORBID OBESITY WITH BMI OF 45.0-49.9, ADULT (HCC): ICD-10-CM

## 2018-10-29 DIAGNOSIS — R53.82 CHRONIC FATIGUE: ICD-10-CM

## 2018-10-29 DIAGNOSIS — E78.00 HYPERCHOLESTEROLEMIA: ICD-10-CM

## 2018-10-29 PROCEDURE — 99214 OFFICE O/P EST MOD 30 MIN: CPT | Performed by: INTERNAL MEDICINE

## 2018-10-29 PROCEDURE — 97803 MED NUTRITION INDIV SUBSEQ: CPT | Performed by: DIETITIAN, REGISTERED

## 2018-10-29 RX ORDER — MULTIVIT-MIN/IRON/FOLIC ACID/K 18-600-40
2000 CAPSULE ORAL DAILY
Qty: 30 TAB | Refills: 1 | COMMUNITY
End: 2022-10-10

## 2018-10-29 NOTE — PROGRESS NOTES
Bariatric Medicine Follow Up  Chief Complaint   Patient presents with   • Weight Gain       History of Present Illness:   Lisa Whaley is a 28 y.o. female who presents for weight management follow-up and to help address co-morbidities related to overweight, including HLD, fatigue.    During the patient's last visit, the following were discussed and recommended:  Weight Goal: 5% wt loss at one month after start (pt goal weight is 170-200 lb)  Diet:   High Protein/Low Carb Meals and 2 snacks between meals daily  Avoid skipping breakfast  Consider Premier protein or Robard meal replacement shake or bars for breakfast daily  >100 g protein, <100 g total carbs daily  64+ oz water per day  Avoid sweet drinks and sodas; reduce diet soda intake from 3 to 1/day to start  Track daily intake with my fitness pal, as she is doing  Physical Activity: Continue walking, cardio, weight lifting 3 times per week times 60 minutes  Risk level for moderate/vigorous exercise program:   Low  New Rx:   None.  Consider weight loss medication pending course.  Behavior change:   Mindful eating, planning, tracking, avoid meal skipping  Follow-up: 2-month  1 month with RD    First month on [facility] program, not skipping AM meal, using MR bravo.  This past month, not following.  Less time to make.  Using Simeon HERNANDES about half the week.  Not always having before work, hardest time.  Drinking less soda, not at work anymore.  2-3 Diet soda per day, feels she is cutting back.      Some tracking with MyFitnessPal.  Averaging <1800 kcal/day.  Tried to stay <100 g CHO per day but usually 150 g CHO most days.  Stopped eating so much bread.  Trying to figure out timing of meals.    Sleeping better.  Started Vit D.  Fatigue improving.  Not on statin.    Exercise:   Getting new FitBit  Gym 3-4 times per wk, cardio and wt lifting.     Review of Systems   Denies lightheadedness, worsening fatigue.  Some cravings later in the day.  All other ROS were  "reviewed and are otherwise unchanged from my previous visit with patient.    Physical Exam:    /80   Pulse 89   Ht 1.676 m (5' 6\")   Wt 122.6 kg (270 lb 4.8 oz)   SpO2 94%   BMI 43.63 kg/m²   Waist: 46 in  Body fat % 55  REE 2303 kcal/day    Weight change since last visit: -4 lb   Waist Circum change since last visit: -1 in     Constitutional: Oriented to person, place, and time and well-developed, well-nourished, and in no distress.    Head: Normocephalic.   Musculoskeletal: Normal range of motion. No edema.   Neurological: Alert and oriented to person, place, and time. No muscle weakness.  Gait normal.   Skin: Warm and dry. Not diaphoretic.   Psychiatric: Mood, memory, affect and judgment normal.     Laboratory:   None new.      Dietitian Assessment: I have reviewed the Dietitian's assessment related to this encounter.       ASSESSMENT/PLAN:  Body mass index is 43.63 kg/m².    Obesity Stage ([place]): 1; Class 3    1. Morbid obesity with BMI of 45.0-49.9, adult ([facility])     2. Hypercholesterolemia     3. Vitamin D deficiency     4. Chronic fatigue       The patient has begun to make some behavioral and food-related changes, has begun weight loss.  Needs to work on timing of meals, so meal replacements can be more effective.  Fatigue somewhat improved.  Continue vitamin D repletion.  Monitor lipids with weight loss.    The patient and I have discussed at length and agree to the following recommendations, which are all addressing the above diagnoses:    Weight Goal: 3-5% wt loss each month (pt goal weight is 170-200 lb)  Diet: High Protein/Low Carb Meals and 2 snacks between meals daily  1 Robard meal replacement shake or bars daily if possible  >100 g protein, <100 g total carbs daily, 2770-2704 kcal per day  64+ oz water per day  Avoid sweet drinks and sodas  Track daily intake with my fitness pal, as she is doing  Physical Activity: Gym 3 days/week  Start tracking steps with Fitbit  Risk level for " moderate/vigorous exercise program: Low  New Rx: None.  Consider weight loss medication pending course.  Behavior change: Mindful eating, tracking, resume stimulus narrowing  Follow-up: One month    Patient's body mass index is 43.63 kg/m². Exercise and nutrition counseling were performed at this visit.

## 2018-10-29 NOTE — PROGRESS NOTES
"Nutrition Reassess: Medical Weight Management   Today's date: 10/29/2018  Referring Provider: No ref. provider found      Time: in/out 2:04-2:30  Visit#: 3    Subjective:  - Has been using My Fitness Pal, admits its very difficult for to reach Dr. Parrish's recommendations of <100 grams of carbs and >100 grams of protein  - Much less consistent with meal replacements lately for breakfast, is not hungry when she wakes up  - Graduating school soon, on a 2 week break now  - Really wants to get started meal prepping, but needs some ideas on how to get started  - Feels like she goes over on carbs even after just a sandwich, meal replacement, and chicken salad. Not sure if she is getting too many carbs from vegetables.     Anthropometrics/Objective  Today's weight:    Vitals:    10/29/18 1315   BP: 128/80   Weight: 122.6 kg (270 lb 4.8 oz)   Height: 1.676 m (5' 6\")     BMI:  Body mass index is 43.63 kg/m².  Starting weight/date 274lb       Total weight change : - 3.7 lbs           Meal Plan:   7633-1278 kcal high protein diet with 1 meal replacements per day     ReAssesment/Notes:  Lisa has not been having a meal replacement lately when she wakes up (1pm) because she is not hungry and does not want to force herself to eat. I have asked her not to force herself to eat if she is not hungry, but to consider if it is causing her to overeat at the next meal or leading to her making a poor choice later on because she waited too long. Another option might be to wait 1-2 hrs after she wakes up and have a small protein snack like a string cheese. Now that she has a break with school she plans to get started meal prepping on Monday - we discussed some ideas for this. Answered some additional questions she had regarding fiber, sugar, and fat/carbs when tracking on My Fitness Pal. Recommended she start with 35% of kcals from CHO and work towards that as and not worry about carbohydrates coming from non-starchy vegetables. "     Follow-up: 6 weeks MD/RD

## 2018-11-08 ENCOUNTER — NON-PROVIDER VISIT (OUTPATIENT)
Dept: URGENT CARE | Facility: PHYSICIAN GROUP | Age: 28
End: 2018-11-08

## 2018-11-08 DIAGNOSIS — Z11.1 SCREENING FOR TUBERCULOSIS: ICD-10-CM

## 2018-11-08 PROCEDURE — 86580 TB INTRADERMAL TEST: CPT | Performed by: FAMILY MEDICINE

## 2018-11-08 NOTE — PROGRESS NOTES
Lisa Whaley is a 28 y.o. female here for a non-provider visit for PPD placement -- Step 1 of 1    Reason for PPD:  work requirement    1. TB evaluation questionnaire completed by patient? Yes      -  If any answers marked yes did you contact a provider prior to placing? Not Indicated  2.  Patient notified to return to clinic for reading on:   11/10/18 after 1:21p  11/11/18 before 1:21p  3.  PPD Placement documentation completed on TB evaluation questionnaire? Yes  4.  Location of TB evaluation questionnaire filed: back office

## 2018-11-10 ENCOUNTER — NON-PROVIDER VISIT (OUTPATIENT)
Dept: URGENT CARE | Facility: PHYSICIAN GROUP | Age: 28
End: 2018-11-10

## 2018-11-10 LAB — TB WHEAL 3D P 5 TU DIAM: NORMAL MM

## 2018-11-10 NOTE — PROGRESS NOTES
Lisa Whaley is a 28 y.o. female here for a non-provider visit for PPD reading -- Step 1 of 1.      1.  Resulted in Epic under enter/edit results? Yes   2.  TB evaluation questionnaire scanned into chart and original given to patient?Yes      3. Was induration greater than 0 mm? No.    Routed to PCP? No

## 2018-12-11 ENCOUNTER — OFFICE VISIT (OUTPATIENT)
Dept: HEALTH INFORMATION MANAGEMENT | Facility: MEDICAL CENTER | Age: 28
End: 2018-12-11
Payer: COMMERCIAL

## 2018-12-11 VITALS
HEIGHT: 66 IN | SYSTOLIC BLOOD PRESSURE: 118 MMHG | WEIGHT: 247.9 LBS | OXYGEN SATURATION: 94 % | BODY MASS INDEX: 39.84 KG/M2 | DIASTOLIC BLOOD PRESSURE: 72 MMHG | HEART RATE: 80 BPM

## 2018-12-11 DIAGNOSIS — E78.00 HYPERCHOLESTEROLEMIA: ICD-10-CM

## 2018-12-11 DIAGNOSIS — R53.82 CHRONIC FATIGUE: ICD-10-CM

## 2018-12-11 DIAGNOSIS — E55.9 VITAMIN D DEFICIENCY: ICD-10-CM

## 2018-12-11 DIAGNOSIS — E66.01 MORBID OBESITY WITH BMI OF 45.0-49.9, ADULT (HCC): ICD-10-CM

## 2018-12-11 PROCEDURE — 99214 OFFICE O/P EST MOD 30 MIN: CPT | Performed by: INTERNAL MEDICINE

## 2018-12-11 PROCEDURE — 97803 MED NUTRITION INDIV SUBSEQ: CPT | Performed by: DIETITIAN, REGISTERED

## 2018-12-11 NOTE — LETTER
Medical Weight Management  Patient Consent Form For Contrave (Naltrexone/buproprion)    I hereby authorize the physician and their staff to assist me in my weight reduction efforts. I understand that my treatment program consists of a balanced diet, a regular exercise program, instruction in behavior austin?cation techniques, meeting with a registered dietician, and the use of the appetite suppressant medication Contrave. I also understand that regular medical visits will be necessary while on the medication and that Contrave must be used with caution and under direct supervision of the physician.    Risk of Proposed Treatment: I understand that any medical treatment may involve risks as well as the proposed bene?ts of weight loss. I understand that this authorization is given with the knowledge that the use of Contrave involves risk. Risks of Contrave include but are not limited to suicidal thoughts, sudden decrease in vision, glaucoma, birth defects in your unborn child if you take this medication while pregnant, hypoglycemia.  Risks can also include nervousness, diarrhea, constipation, sleeplessness, headache, tremor, fever, fainting, dry mouth, rash, change in libido, difficulty urinating, shortness of breath, swelling of feet or ankles, tiredness, dizziness, weakness, allergic reactions, psychological imbalances, hallucinations, stomach cramps, high blood pressure, palpitations, arrhythmias, rapid heart rate, hepatitis, moshe, depression, insomnia. In case of serious side effects or if you become pregnant, stop taking the Contrave and seek immediate medical assistance. I also understand that there are certain health risks associated with remaining overweight or obese, including high blood pressure, diabetes, heart attack and heart disease, arthritis of the joints, sleep apnea, and sudden death. Do not use with alcohol.    I further understand that Contrave should not be used by people who suffer from  glaucoma, seizures, anorexia or bulimia, those with a history of drug dependency, opioid dependency, alcoholism, psychotic illness, uncontrolled hypertension, pregnancy, on MAOI’s, serotonin migraine medications, Wellbutrin or other buproprion-containing medication, or lithium.    While taking Contrave, avoid taking the following medications: Decongestant medications (Sudafed/Pseudoephedrine, Tylenol Sinus, Claritin D, Zyrtec D and Allegra D), Stimulant medications, high doses of caffeine, other weight loss medications, ephedrine MAO inhibitions and alcohol.    Patient Responsibility:   As the patient, I understand it is my responsibility to follow instructions carefully, and to report to the physician any significant medical problems that I think may be related to my weight control program as soon as possible. I agree to notify the physician of any medical problems that I may have or any results of labs/tests ordered and reviewed by any other physician. I further acknowledge that I enter into this program in full knowledge and understanding that no physician, provider, or staff of the weight loss physician has prior knowledge as to whether I would or would not have adverse effects due to the fact that each individual has a different biological and chemical make -up. I understand the purpose of this treatment is to assist me in my desire to decrease my body weight and to maintain this weight loss. I understand my continuing to receive Contrave will be dependent on my progress in weight reduction and weight maintenance.  If I am female, I will need to have a monthly negative pregnancy test in order to receive the next month’s prescription. I understand that a balanced caloric counting program combined with regular exercise without the use of Contrave may likely prove successful if followed, even though I would be hungrier than without the suppressant.    I am also in full understanding that Contrave will be used no  longer than 3 consecutive months. After 3 months of use the medication will be discontinued.     If you and the physician agree to use the medication longer than 3 months or if your BMI has decreased below the Federal Drug Administration's recommended value you will be using the medication in an off-label manner.  Contrave may result in lethargy or depression with abrupt discontinuation and I understand that during the program, medications will be discontinued if:  1.) I become pregnant, try to become pregnant, or suspect that I am pregnant.  2.) I develop a contraindication or serious side effect of the medication.  3.) I do not comply with medical requirements, i.e. visits, med doses, etc.  4.) I fail to lose and/or maintain weight appropriately.  5.) I have a planned surgery. Medications are to be stopped at least 2 weeks prior to any surgical procedure requiring general anesthesia.    Women Only: I understand Contrave should not be taken during pregnancy, due to the chance of damage to the fetus. This has been explained to me fully, and I am aware of the risks involved. To the best of my knowledge, I am not pregnant. I am aware of the precautions that should be taken to avoid pregnancy while I am on the medication. If I become pregnant, I will advise both the physician and my OB/GYN immediately. In addition, Contrave is not to be used while breast feeding.    No Guarantee:  I understand that much of the success of the program will depend on my effort, and that there is no guarantee that the program will be successful.  I understand that I will have to continue with sensible and nutritional eating habits and regular exercise all my life, if I am able to be successful long-term.     Patient Consent/Waiver:   I have read and fully understand this document and authorize and accept the proposed care regardless of the risk. I affirm that my questions have been satisfactorily answered at this time. I realize that I  should not sign this form if all items are not understood by me or if questions have not been answered to my satisfaction. I hereby release the physician and [facility] from any liability associated and connected with my participation in this weight loss program.  I accept the risks as discussed above, in hopes of obtaining desired bene?cial results of weight loss treatment. I understand it is my responsibility to give the physician the name of my primary care physician where labs and/or EKG can be obtained for follow through and interpretation, if need be.     WARNING: If you have any questions as to the risks or hazards of the proposed treatment, or any questions whatsoever concerning the proposed treatment or other possible treatments, ask the physician now before signing this consent form. To conclude, by signing this document you are agreeing to the risks associated with Contrave. You are agreeing that to be successful in your weight loss goals you must alter your lifestyle and adopt healthy eating and exercise patterns. You are agreeing that you are not pregnant or breast feeding. You are agreeing that you understand Contrave may, in rare instances, be addictive. You are agreeing that you must notify the physician of any medical conditions current or that develop while taking Contrave and you are agreeing that this document has been adequately explained to you and that you understand the document in its entirety.    Alternatives to treatment, and no treatment, including the risks and benefits thereof have been discussed with me.      _____________________________________  Patient / Authorized Legal Representative    _____________________________________  Relationship to Patient (if not patient)    _____________________________________  Date/Time    Provider Declaration:   I have explained the contents of this document to the patient and have answered all the patient’s related questions. To the best of my  knowledge, I feel the patient has been adequately informed concerning the bene?ts and risks associated with the use of Contrave, the bene?ts and risks associated with alternative therapies, and the risks concerning an overweight status. After being adequately informed, the patient has consented.      _____________________________________  Physician Signature    _____________________________________  Physician Name (printed)     _____________________________________  Date/Time

## 2018-12-11 NOTE — PROGRESS NOTES
"Nutrition Reassess: Medical Weight Management   Today's date: 12/11/2018  Referring Provider: Shikha Saul M.D.     Time: in/out 2:57-3:11  Visit#: 4    Subjective:   - She did practice meal planning and it went well for 1-2 weeks then Thanksgiving and finishing school threw her off as well as increased social activities with friends.   - Starting Contrave   - Wants to get back on track and do what she can through the holiday season   - Either [place] to [place] to walk with a friend or the gym with another friend for PA and thinks she can work on doing more PA   - Inconsistently using MR shelly and bars      Anthropometrics/Objective  Today's weight:    Vitals:    12/11/18 1437   BP: 118/72   Weight: 112.4 kg (247 lb 14.4 oz)   Height: 1.676 m (5' 6\")     BMI:  Body mass index is 40.01 kg/m².  Starting weight/date 274lb            Total weight change : - 0.2 lb            Meal Plan:   Low CHO / high protein / calorie controlled diet per Dr. Cruz with 0-1 meal replacements per day     ReAssesment/Notes:  Talked about how to make better choices dining out with friends like having a side salad instead of the shared appetizers, ordering diet coke instead of coke, asking for a box to be brought out with the meal to portion out 1/2 to save for another meal and choosing \"mocktails\" like soda and lime w/o vodka or a diet cranberry and lime w/o vodka so it looks like she is drinking, but isn't, to avoid friends pressuring her to join them in drinking. We also set a goal for intentional PA 3 x week and to add more if she overdid it at an event to help offset this.    Follow-up: 4-6 weeks w/ MD and RD    "

## 2018-12-11 NOTE — PROGRESS NOTES
Bariatric Medicine Follow Up  Chief Complaint   Patient presents with   • Weight Gain       History of Present Illness:   Lisa Whaley is a 28 y.o. female who presents for weight management follow-up and to help address co-morbidities related to overweight, including HLD, VDD, fatigue.    During the patient's last visit, the following were discussed and recommended:  Weight Goal: 3-5% wt loss each month (pt goal weight is 170-200 lb)  Diet: High Protein/Low Carb Meals and 2 snacks between meals daily  1 Robard meal replacement shake or bars daily if possible  >100 g protein, <100 g total carbs daily, 5820-5702 kcal per day  64+ oz water per day  Avoid sweet drinks and sodas  Track daily intake with my fitness pal, as she is doing  Physical Activity: Gym 3 days/week  Start tracking steps with Fitbit  Risk level for moderate/vigorous exercise program: Low  New Rx: None.  Consider weight loss medication pending course.  Behavior change: Mindful eating, tracking, resume stimulus narrowing  Follow-up: One month    Not motivated to make change recently.  Did well until Thanksgiving holiday, eating out more.  Graduating from classes so busy, not wanting to cook.  Having Simeon MR about twice per wk, feels it helps but still eating large dinner.  Wonders if antiobesity med may help control her eating.  Does not have motivation to change.    Wants to consider anti-obesity medication.  Does not want to take birth control, was told with her smoking, elevated blood pressure in the past, she should not be on birth control.    On Vit D repletion.  Fatigue level about the same.  Not on a statin.    Exercise:   Gym 3 times per wk  Not going consistently the last few wks     Review of Systems   Denies lightheaded, worsening fatigue  All other ROS were reviewed and are otherwise unchanged from my previous visit with patient.    Physical Exam:    /72 (BP Location: Left arm, Patient Position: Sitting, BP Cuff Size:  "Large adult)   Pulse 80   Ht 1.676 m (5' 6\")   Wt 112.4 kg (247 lb 14.4 oz)   SpO2 94%   BMI 40.01 kg/m²   Waist:  45 in  Body fat %  54  REE  2335 kcal/day    Weight change since last visit:  +5 lb (0 lb total)  Waist Circum change since last visit:  -1 in (-2 in total)    Constitutional: Oriented to person, place, and time and well-developed, well-nourished, and in no distress.    Head: Normocephalic.   Musculoskeletal: Normal range of motion. No edema.   Neurological: Alert and oriented to person, place, and time. No muscle weakness.  Gait normal.   Skin: Warm and dry. Not diaphoretic.   Psychiatric: Mood, memory, affect and judgment normal.     Laboratory:   None new.      Dietitian Assessment: I have reviewed the Dietitian's assessment related to this encounter.       ASSESSMENT/PLAN:  Body mass index is 40.01 kg/m².    Obesity Stage ([place]):  1; Class 3    1. Morbid obesity with BMI of 45.0-49.9, adult ([facility])     2. Hypercholesterolemia     3. Vitamin D deficiency     4. Chronic fatigue       Stimulus narrowing appeared to be effective when she started it, but not consistent.  Needs to reduce total portion size, will try anti-obesity medication to help.  Needs to increase activity, be consistent with food changes.  Monitor fatigue, vitamin D, lipids with weight loss, all of which should improve.    The patient and I have discussed at length and agree to the following recommendations, which are all addressing the above diagnoses:    Weight Goal: 3-5% wt loss each month (pt goal weight is 170-200 lb)  Diet: Meal replacement shake for breakfast daily, be consistent  Avoid alcohol with Contrave  High-protein/low carb meals and snacks  Physical Activity:  Consistent gym 3 times per wk  Risk level for moderate/vigorous exercise program: Low  New Rx: Start Contrave  Side Effects: Consent reviewed, signed, in chart.  Behavior change: Continue to assess patient's readiness to change  Follow-up: 1 " month    Patient's body mass index is 40.01 kg/m². Exercise and nutrition counseling were performed at this visit.

## 2019-04-19 ENCOUNTER — EH NON-PROVIDER (OUTPATIENT)
Dept: OCCUPATIONAL MEDICINE | Facility: CLINIC | Age: 29
End: 2019-04-19

## 2019-04-19 DIAGNOSIS — Z01.89 RESPIRATORY CLEARANCE EXAMINATION, ENCOUNTER FOR: ICD-10-CM

## 2019-04-19 PROCEDURE — 94375 RESPIRATORY FLOW VOLUME LOOP: CPT | Performed by: PREVENTIVE MEDICINE

## 2019-09-19 ENCOUNTER — HOSPITAL ENCOUNTER (OUTPATIENT)
Dept: LAB | Facility: MEDICAL CENTER | Age: 29
End: 2019-09-19
Payer: COMMERCIAL

## 2019-09-19 LAB
BDY FAT % MEASURED: 47.2 %
BP DIAS: 68 MMHG
BP SYS: 124 MMHG
CHOLEST SERPL-MCNC: 187 MG/DL (ref 100–199)
DIABETES HTDIA: NO
EVENT NAME HTEVT: NORMAL
FASTING HTFAS: YES
GLUCOSE SERPL-MCNC: 88 MG/DL (ref 65–99)
HDLC SERPL-MCNC: 59 MG/DL
HYPERTENSION HTHYP: NO
LDLC SERPL CALC-MCNC: 100 MG/DL
SCREENING LOC CITY HTCIT: NORMAL
SCREENING LOC STATE HTSTA: NORMAL
SCREENING LOCATION HTLOC: NORMAL
SMOKING HTSMO: NO
SUBSCRIBER ID HTSID: NORMAL
TRIGL SERPL-MCNC: 138 MG/DL (ref 0–149)

## 2019-09-19 PROCEDURE — 82947 ASSAY GLUCOSE BLOOD QUANT: CPT

## 2019-09-19 PROCEDURE — 36415 COLL VENOUS BLD VENIPUNCTURE: CPT

## 2019-09-19 PROCEDURE — S5190 WELLNESS ASSESSMENT BY NONPH: HCPCS

## 2019-09-19 PROCEDURE — 80061 LIPID PANEL: CPT

## 2019-09-25 ENCOUNTER — IMMUNIZATION (OUTPATIENT)
Dept: OCCUPATIONAL MEDICINE | Facility: CLINIC | Age: 29
End: 2019-09-25

## 2019-09-25 DIAGNOSIS — Z23 NEED FOR VACCINATION: ICD-10-CM

## 2019-09-25 PROCEDURE — 90686 IIV4 VACC NO PRSV 0.5 ML IM: CPT | Performed by: PREVENTIVE MEDICINE

## 2019-10-05 ENCOUNTER — DOCUMENTATION (OUTPATIENT)
Dept: OCCUPATIONAL MEDICINE | Facility: CLINIC | Age: 29
End: 2019-10-05

## 2019-10-15 ENCOUNTER — EH NON-PROVIDER (OUTPATIENT)
Dept: OCCUPATIONAL MEDICINE | Facility: CLINIC | Age: 29
End: 2019-10-15

## 2019-10-15 DIAGNOSIS — Z02.89 ENCOUNTER FOR OCCUPATIONAL HEALTH EXAMINATION INVOLVING RESPIRATOR: ICD-10-CM

## 2019-10-15 PROCEDURE — 94375 RESPIRATORY FLOW VOLUME LOOP: CPT | Performed by: NURSE PRACTITIONER

## 2019-10-24 ENCOUNTER — OFFICE VISIT (OUTPATIENT)
Dept: URGENT CARE | Facility: PHYSICIAN GROUP | Age: 29
End: 2019-10-24
Payer: COMMERCIAL

## 2019-10-24 VITALS
TEMPERATURE: 98.2 F | SYSTOLIC BLOOD PRESSURE: 118 MMHG | HEART RATE: 117 BPM | HEIGHT: 66 IN | WEIGHT: 280 LBS | DIASTOLIC BLOOD PRESSURE: 80 MMHG | OXYGEN SATURATION: 94 % | BODY MASS INDEX: 45 KG/M2

## 2019-10-24 DIAGNOSIS — J02.9 SORE THROAT: ICD-10-CM

## 2019-10-24 DIAGNOSIS — L73.9 FOLLICULITIS OF LEFT AXILLA: ICD-10-CM

## 2019-10-24 DIAGNOSIS — J02.9 ACUTE PHARYNGITIS, UNSPECIFIED ETIOLOGY: ICD-10-CM

## 2019-10-24 DIAGNOSIS — R59.0 LYMPHADENOPATHY, CERVICAL: ICD-10-CM

## 2019-10-24 PROCEDURE — 99214 OFFICE O/P EST MOD 30 MIN: CPT | Performed by: NURSE PRACTITIONER

## 2019-10-24 RX ORDER — AMOXICILLIN 500 MG/1
500 CAPSULE ORAL 2 TIMES DAILY
Qty: 20 CAP | Refills: 0 | Status: SHIPPED | OUTPATIENT
Start: 2019-10-24 | End: 2019-11-03

## 2019-10-24 ASSESSMENT — ENCOUNTER SYMPTOMS
VOMITING: 0
WEAKNESS: 0
SHORTNESS OF BREATH: 0
NAUSEA: 0
CHILLS: 1
NECK PAIN: 0
DIZZINESS: 0
BACK PAIN: 0
FEVER: 1
HEADACHES: 0
TINGLING: 0
SORE THROAT: 1
SINUS PAIN: 0

## 2019-10-24 ASSESSMENT — LIFESTYLE VARIABLES: SUBSTANCE_ABUSE: 0

## 2019-10-24 NOTE — LETTER
October 24, 2019       Patient: Lisa Whaley   YOB: 1990   Date of Visit: 10/24/2019         To Whom It May Concern:    It is my medical opinion that Lisa Whaley return to full duty, no restrictions on 10/26/19 due to a medical illness.              Sincerely,          BG Person  Electronically Signed

## 2019-10-24 NOTE — PROGRESS NOTES
"Subjective:      Lisa Whaley is a 29 y.o. female who presents with URI (Head and chest congestion, swollen nodes onset monday night)        Reviewed past medical, surgical and family history. Reviewed prescription and OTC medications with patient in electronic health record today      Allergies   Allergen Reactions   • Vicodin [Hydrocodone-Acetaminophen]          HPI is a new problem.  Lisa is a 29-year-old female presents with the following complaints: #1) complaint of sore throat, head congestion, swollen lymph nodes in her neck.  Treatments tried: Salt water gargles, Tylenol, ibuprofen.  The been no relief of her symptoms.  Today she felt feverish.  Unknown exposure to strep throat.  She missed work on 3 days this week.  Onset of her symptoms was 7 days ago.  No other aggravating relieving factors.  #2) complaint of red swollen area under her left arm.  Redness is getting larger.  She does not know if this is making her sick.  She has no history of abscesses.  It is tender to touch.  Treatments tried none.  No other aggravating or relieving factors.    Review of Systems   Constitutional: Positive for chills, fever and malaise/fatigue.   HENT: Positive for congestion, ear pain and sore throat. Negative for nosebleeds and sinus pain.    Respiratory: Negative for shortness of breath.    Cardiovascular: Negative for chest pain.   Gastrointestinal: Negative for nausea and vomiting.   Musculoskeletal: Negative for back pain and neck pain.   Neurological: Negative for dizziness, tingling, weakness and headaches.   Endo/Heme/Allergies: Negative for environmental allergies.   Psychiatric/Behavioral: Negative for substance abuse.          Objective:     /80   Pulse (!) 117   Temp 36.8 °C (98.2 °F)   Ht 1.676 m (5' 6\")   Wt (!) 127 kg (280 lb)   SpO2 94%   BMI 45.19 kg/m²      Physical Exam   Constitutional: She is oriented to person, place, and time. Vital signs are normal. She appears " well-developed and well-nourished.  Non-toxic appearance. No distress.   HENT:   Head: Normocephalic.   Right Ear: Hearing, tympanic membrane, external ear and ear canal normal.   Left Ear: Hearing, tympanic membrane, external ear and ear canal normal.   Nose: Nose normal. Right sinus exhibits no frontal sinus tenderness. Left sinus exhibits no frontal sinus tenderness.   Mouth/Throat: Uvula is midline and mucous membranes are normal. Posterior oropharyngeal erythema present. No oropharyngeal exudate, posterior oropharyngeal edema or tonsillar abscesses.   Eyes: Pupils are equal, round, and reactive to light. Lids are normal.   Neck: Trachea normal, normal range of motion, full passive range of motion without pain and phonation normal. Neck supple.   Cardiovascular: Normal rate, regular rhythm and normal pulses.   Pulmonary/Chest: Effort normal and breath sounds normal. No respiratory distress.   Abdominal: Soft.   Musculoskeletal: Normal range of motion.   Lymphadenopathy:        Head (right side): Tonsillar adenopathy present. No submental and no submandibular adenopathy present.        Head (left side): Tonsillar adenopathy present. No submental and no submandibular adenopathy present.     She has cervical adenopathy.        Right cervical: No superficial cervical, no deep cervical and no posterior cervical adenopathy present.       Left cervical: Superficial cervical adenopathy present. No deep cervical and no posterior cervical adenopathy present.        Right: No supraclavicular adenopathy present.        Left: No supraclavicular adenopathy present.   Neurological: She is alert and oriented to person, place, and time. She has normal reflexes.   Skin: Skin is warm and dry. Capillary refill takes less than 2 seconds. There is erythema.        Psychiatric: She has a normal mood and affect. Her speech is normal and behavior is normal. Thought content normal.   Nursing note and vitals reviewed.               Assessment/Plan:     1. Acute pharyngitis, unspecified etiology    - amoxicillin (AMOXIL) 500 MG Cap; Take 1 Cap by mouth 2 times a day for 10 days.  Dispense: 20 Cap; Refill: 0    2. Folliculitis of left axilla  Warm packs BID/ TID   Wash daily with mild soap and water.   Deodarant - avoid antiperspirant at this time.   - amoxicillin (AMOXIL) 500 MG Cap; Take 1 Cap by mouth 2 times a day for 10 days.  Dispense: 20 Cap; Refill: 0    3. Lymphadenopathy, cervical    - amoxicillin (AMOXIL) 500 MG Cap; Take 1 Cap by mouth 2 times a day for 10 days.  Dispense: 20 Cap; Refill: 0    4. Sore throat    - Maalox Plus - Diphenhydramine - Lidocaine (MBX Oral Solution); Take 15 mL by mouth every 6 hours as needed.  Dispense: 120 mL; Refill: 0    Salt water gargles BID and prn. Suggested 1/4 to 1/2 teaspoon (1.5 to 3.0 g) of salt per one cup (8 ounces or 250 mL) of warm water    OTC Antipyretic of choice (Acetaminophen, Ibuprofen) for fevers greater than or equal to 101.5 degrees.   OTC  analgesic of choice. Follow manufactures dosing and safety precautions.     Return to clinic or PCP 5-7  days if current symptoms are not resolving in a satisfactory manner or sooner if new or worsening symptoms occur. Differential diagnosis, natural history, supportive care, and indications for immediate follow-up discussed at length.   Patient was advised of signs and symptoms which would warrant further evaluation and /or emergent evaluation in ER.  Verbalized agreement with this treatment plan and seemed to understand without barriers. Questions were encouraged and answered to patients satisfaction.

## 2020-07-21 ENCOUNTER — EH NON-PROVIDER (OUTPATIENT)
Dept: OCCUPATIONAL MEDICINE | Facility: CLINIC | Age: 30
End: 2020-07-21

## 2020-07-21 DIAGNOSIS — Z23 NEED FOR VACCINATION: Primary | ICD-10-CM

## 2020-07-21 PROCEDURE — 90715 TDAP VACCINE 7 YRS/> IM: CPT | Performed by: NURSE PRACTITIONER

## 2020-11-09 ENCOUNTER — EH NON-PROVIDER (OUTPATIENT)
Dept: OCCUPATIONAL MEDICINE | Facility: CLINIC | Age: 30
End: 2020-11-09

## 2020-11-09 ENCOUNTER — HOSPITAL ENCOUNTER (OUTPATIENT)
Facility: MEDICAL CENTER | Age: 30
End: 2020-11-09
Attending: PREVENTIVE MEDICINE
Payer: COMMERCIAL

## 2020-11-09 DIAGNOSIS — Z11.59 ENCOUNTER FOR SCREENING FOR OTHER VIRAL DISEASES: ICD-10-CM

## 2020-11-09 PROCEDURE — U0003 INFECTIOUS AGENT DETECTION BY NUCLEIC ACID (DNA OR RNA); SEVERE ACUTE RESPIRATORY SYNDROME CORONAVIRUS 2 (SARS-COV-2) (CORONAVIRUS DISEASE [COVID-19]), AMPLIFIED PROBE TECHNIQUE, MAKING USE OF HIGH THROUGHPUT TECHNOLOGIES AS DESCRIBED BY CMS-2020-01-R: HCPCS | Performed by: PREVENTIVE MEDICINE

## 2020-11-10 DIAGNOSIS — Z11.59 ENCOUNTER FOR SCREENING FOR OTHER VIRAL DISEASES: ICD-10-CM

## 2020-11-10 LAB
COVID ORDER STATUS COVID19: NORMAL
SARS-COV-2 RNA RESP QL NAA+PROBE: NOTDETECTED
SPECIMEN SOURCE: NORMAL

## 2020-11-11 ENCOUNTER — TELEPHONE (OUTPATIENT)
Dept: OCCUPATIONAL MEDICINE | Facility: CLINIC | Age: 30
End: 2020-11-11

## 2020-11-11 NOTE — TELEPHONE ENCOUNTER
Calling in regards to pts. COVID-19 lab results. Requested a call back at [phone].  If results were received on [facility], pt. is advised to call employee screening line at [phone] for further instructions.

## 2020-12-16 DIAGNOSIS — Z23 NEED FOR VACCINATION: ICD-10-CM

## 2020-12-18 ENCOUNTER — APPOINTMENT (OUTPATIENT)
Dept: FAMILY PLANNING/WOMEN'S HEALTH CLINIC | Facility: IMMUNIZATION CENTER | Age: 30
End: 2020-12-18
Attending: FAMILY MEDICINE
Payer: COMMERCIAL

## 2020-12-18 DIAGNOSIS — Z23 NEED FOR VACCINATION: ICD-10-CM

## 2020-12-18 PROCEDURE — 0001A PFIZER SARS-COV-2 VACCINE: CPT

## 2020-12-18 PROCEDURE — 91300 PFIZER SARS-COV-2 VACCINE: CPT

## 2020-12-19 ENCOUNTER — IMMUNIZATION (OUTPATIENT)
Dept: FAMILY PLANNING/WOMEN'S HEALTH CLINIC | Facility: IMMUNIZATION CENTER | Age: 30
End: 2020-12-19

## 2020-12-19 DIAGNOSIS — Z23 ENCOUNTER FOR VACCINATION: Primary | ICD-10-CM

## 2021-01-10 ENCOUNTER — IMMUNIZATION (OUTPATIENT)
Dept: FAMILY PLANNING/WOMEN'S HEALTH CLINIC | Facility: IMMUNIZATION CENTER | Age: 31
End: 2021-01-10
Attending: FAMILY MEDICINE
Payer: COMMERCIAL

## 2021-01-10 DIAGNOSIS — Z23 ENCOUNTER FOR VACCINATION: Primary | ICD-10-CM

## 2021-01-10 PROCEDURE — 91300 PFIZER SARS-COV-2 VACCINE: CPT

## 2021-01-10 PROCEDURE — 0002A PFIZER SARS-COV-2 VACCINE: CPT

## 2021-06-04 ENCOUNTER — EH NON-PROVIDER (OUTPATIENT)
Dept: OCCUPATIONAL MEDICINE | Facility: CLINIC | Age: 31
End: 2021-06-04

## 2021-06-04 ENCOUNTER — HOSPITAL ENCOUNTER (OUTPATIENT)
Facility: MEDICAL CENTER | Age: 31
End: 2021-06-04
Attending: PREVENTIVE MEDICINE
Payer: COMMERCIAL

## 2021-06-04 DIAGNOSIS — Z11.59 ENCOUNTER FOR SCREENING FOR OTHER VIRAL DISEASES: ICD-10-CM

## 2021-06-04 PROCEDURE — U0003 INFECTIOUS AGENT DETECTION BY NUCLEIC ACID (DNA OR RNA); SEVERE ACUTE RESPIRATORY SYNDROME CORONAVIRUS 2 (SARS-COV-2) (CORONAVIRUS DISEASE [COVID-19]), AMPLIFIED PROBE TECHNIQUE, MAKING USE OF HIGH THROUGHPUT TECHNOLOGIES AS DESCRIBED BY CMS-2020-01-R: HCPCS | Performed by: PREVENTIVE MEDICINE

## 2021-08-09 ENCOUNTER — NON-PROVIDER VISIT (OUTPATIENT)
Dept: URGENT CARE | Facility: PHYSICIAN GROUP | Age: 31
End: 2021-08-09

## 2021-08-09 DIAGNOSIS — Z11.1 SCREENING FOR TUBERCULOSIS: Primary | ICD-10-CM

## 2021-08-09 PROCEDURE — 86580 TB INTRADERMAL TEST: CPT | Performed by: FAMILY MEDICINE

## 2021-08-09 NOTE — PROGRESS NOTES
Lisa Whaley is a 30 y.o. female here for a non-provider visit for PPD placement -- Step 1 of 1    Reason for PPD:  work requirement    1. TB evaluation questionnaire completed by patient? Yes      -  If any answers marked yes did you contact a provider prior to placing? No  2.  Patient notified to return to clinic for reading on: 08/11   3.  PPD Placement documentation completed on TB evaluation questionnaire?yes  4.  Location of TB evaluation questionnaire filed: [facility]

## 2021-08-11 ENCOUNTER — NON-PROVIDER VISIT (OUTPATIENT)
Dept: URGENT CARE | Facility: PHYSICIAN GROUP | Age: 31
End: 2021-08-11

## 2021-08-11 LAB — TB WHEAL 3D P 5 TU DIAM: NORMAL MM

## 2021-08-11 PROCEDURE — 99999 PR NO CHARGE: CPT | Performed by: PHYSICIAN ASSISTANT

## 2021-08-11 NOTE — PROGRESS NOTES
Lisa Whaley is a 30 y.o. female here for a non-provider visit for PPD reading -- Step 1 of 1.      1.  Resulted in Epic under enter/edit results? Yes   2.  TB evaluation questionnaire scanned into chart and original given to patient?Yes      3. Was induration greater than 0 mm? No.    Routed to PCP? No

## 2022-04-26 ENCOUNTER — TELEMEDICINE (OUTPATIENT)
Dept: TELEHEALTH | Facility: TELEMEDICINE | Age: 32
End: 2022-04-26
Payer: COMMERCIAL

## 2022-04-26 DIAGNOSIS — R51.9 NONINTRACTABLE HEADACHE, UNSPECIFIED CHRONICITY PATTERN, UNSPECIFIED HEADACHE TYPE: ICD-10-CM

## 2022-04-26 DIAGNOSIS — R11.2 NON-INTRACTABLE VOMITING WITH NAUSEA, UNSPECIFIED VOMITING TYPE: ICD-10-CM

## 2022-04-26 PROCEDURE — 99213 OFFICE O/P EST LOW 20 MIN: CPT | Mod: 95 | Performed by: PHYSICIAN ASSISTANT

## 2022-04-26 RX ORDER — ONDANSETRON 4 MG/1
4 TABLET, FILM COATED ORAL EVERY 4 HOURS PRN
Qty: 10 TABLET | Refills: 0 | Status: SHIPPED | OUTPATIENT
Start: 2022-04-26 | End: 2022-10-25

## 2022-04-26 RX ORDER — NAPROXEN 500 MG/1
500 TABLET ORAL 2 TIMES DAILY WITH MEALS
Qty: 30 TABLET | Refills: 0 | Status: SHIPPED | OUTPATIENT
Start: 2022-04-26 | End: 2022-10-25

## 2022-04-26 NOTE — PATIENT INSTRUCTIONS
Viral Gastroenteritis, Adult    Viral gastroenteritis is also known as the stomach flu. This condition may affect your stomach, your small intestine, and your large intestine. It can cause sudden watery poop (diarrhea), fever, and throwing up (vomiting). This condition is caused by certain germs (viruses). These germs can be passed from person to person very easily (are contagious).  Having watery poop and throwing up can make you feel weak and cause you to not have enough water in your body (get dehydrated). This can make you tired and thirsty, make you have a dry mouth, and make it so you pee (urinate) less often. It is important to replace the fluids that you lose from having watery poop and throwing up.  What are the causes?  · You can get sick by catching viruses from other people.  · You can also get sick by:  ? Eating food, drinking water, or touching a surface that has the viruses on it (is contaminated).  ? Sharing utensils or other personal items with a person who is sick.  What increases the risk?  · Having a weak body defense system (immune system).  · Living with one or more children who are younger than 2 years old.  · Living in a nursing home.  · Going on cruise ships.  What are the signs or symptoms?  Symptoms of this condition start suddenly. Symptoms may last for a few days or for as long as a week.  · Common symptoms include:  ? Watery poop.  ? Throwing up.  · Other symptoms include:  ? Fever.  ? Headache.  ? Feeling tired (fatigue).  ? Pain in the belly (abdomen).  ? Chills.  ? Feeling weak.  ? Feeling sick to your stomach (nauseous).  ? Muscle aches.  ? Not feeling hungry.  How is this treated?  · This condition typically goes away on its own.  · The focus of treatment is to replace the fluids that you lose. This condition may be treated with:  ? An ORS (oral rehydration solution). This is a drink that is sold at pharmacies and stores.  ? Medicines to help with your symptoms.  ? Probiotic  supplements to reduce symptoms of diarrhea.  ? Fluids given through an IV tube, if needed.  · Older adults and people with other diseases or a weak body defense system are at higher risk for not having enough water in the body.  Follow these instructions at home:  Eating and drinking    · Take an ORS as told by your doctor.  · Drink clear fluids in small amounts as you are able. Clear fluids include:  ? Water.  ? Ice chips.  ? Fruit juice with water added to it (diluted).  ? Low-calorie sports drinks.  · Drink enough fluid to keep your pee (urine) pale yellow.  · Eat small amounts of healthy foods every 3-4 hours as you are able. This may include whole grains, fruits, vegetables, lean meats, and yogurt.  · Avoid fluids that have a lot of sugar or caffeine in them, such as energy drinks, sports drinks, and soda.  · Avoid spicy or fatty foods.  · Avoid alcohol.  General instructions    · Wash your hands often. This is very important after you have watery poop or you throw up. If you cannot use soap and water, use hand .  · Make sure that all people in your home wash their hands well and often.  · Take over-the-counter and prescription medicines only as told by your doctor.  · Rest at home while you get better.  · Watch your condition for any changes.  · Take a warm bath to help with any burning or pain from having watery poop.  · Keep all follow-up visits as told by your doctor. This is important.  Contact a doctor if:  · You cannot keep fluids down.  · Your symptoms get worse.  · You have new symptoms.  · You feel light-headed.  · You feel dizzy.  · You have muscle cramps.  Get help right away if:  · You have chest pain.  · You feel very weak.  · You pass out (faint).  · You see blood in your throw-up.  · Your throw-up looks like coffee grounds.  · You have bloody or black poop (stools) or poop that looks like tar.  · You have a very bad headache, or a stiff neck, or both.  · You have a rash.  · You have  very bad pain, cramping, or bloating in your belly.  · You have trouble breathing.  · You are breathing very quickly.  · You have a fast heartbeat.  · Your skin feels cold and clammy.  · You feel mixed up (confused).  · You have pain when you pee.  · You have signs of not having enough water in the body, such as:  ? Dark pee, hardly any pee, or no pee.  ? Cracked lips.  ? Dry mouth.  ? Sunken eyes.  ? Feeling very sleepy.  ? Feeling weak.  Summary  · Viral gastroenteritis is also known as the stomach flu.  · This condition can cause sudden watery poop (diarrhea), fever, and throwing up (vomiting).  · These germs can be passed from person to person very easily.  · Take an ORS as told by your doctor. This is a drink that is sold at pharmacies and stores.  · Drink fluids in small amounts many times each day as you are able.  This information is not intended to replace advice given to you by your health care provider. Make sure you discuss any questions you have with your health care provider.  Document Released: 06/05/2009 Document Revised: 10/23/2019 Document Reviewed: 10/23/2019  Elsevier Patient Education © 2020 [employer]

## 2022-04-26 NOTE — PROGRESS NOTES
Virtual Visit: Established Patient   This visit was conducted via Zoom using secure and encrypted videoconferencing technology.   The patient was in her home in the state [facility].    The patient's identity was confirmed and verbal consent was obtained for this virtual visit.     Subjective:   CC:   Chief Complaint   Patient presents with   • Nausea       Lisa Whaley is a 31 y.o. female presenting for evaluation and management of:    Patient is a pleasant 31-year-old female who presents with concerns of nausea and vomiting that began 3 days ago.  Patient suspects food poisoning.  Patient developed sudden onset of severe vomiting for approximately 2 hours followed by diarrhea for 4 hours.  Patient was also experiencing generalized abdominal pain at that time.  These symptoms improved but she subsequently developed persistent low-grade fevers for the next 1/2 to 2 days.  Fevers have since resolved but she continues to have loose bowel movements ~10-15 a day, nausea, and generalized headaches.  No reported melena or hematochezia.  No current abdominal pain.  Patient has been taking Tylenol and ibuprofen and while these are helping with some of her symptoms they do not seem to be alleviating her headaches.  She has been drinking pedialyte.    ROS   (+)N, V, F, C, malaise, fatigue  (-) abd pain, inability to tolerate oral intake    Current medicines (including changes today)  Current Outpatient Medications   Medication Sig Dispense Refill   • ondansetron (ZOFRAN) 4 MG Tab tablet Take 1 Tablet by mouth every four hours as needed for Nausea/Vomiting. 10 Tablet 0   • naproxen (NAPROSYN) 500 MG Tab Take 1 Tablet by mouth 2 times a day with meals. 30 Tablet 0   • Nutritional Supplements (COLD AND FLU PO) Take  by mouth.     • Maalox Plus - Diphenhydramine - Lidocaine (MBX Oral Solution) Take 15 mL by mouth every 6 hours as needed. 120 mL 0   • RaNITidine HCl (ZANTAC PO) Take  by mouth.     • Naltrexone-Bupropion  HCl ER 8-90 MG TABLET SR 12 HR Take 8-90 mg by mouth See Admin Instructions. Week 1 one tab po QD  Week 2 one tab po BID  Week 3 two tab po QAM, one tab po QPM  Week 4 two tab po BID (Patient not taking: Reported on 10/24/2019) 120 Tab 0   • Vitamin D, Cholecalciferol, 1000 units Tab Take 2,000 Int'l Units/day by mouth every day. 30 Tab 1     No current facility-administered medications for this visit.       Patient Active Problem List    Diagnosis Date Noted   • Chronic fatigue 10/29/2018   • Vitamin D deficiency 10/01/2018   • Hypercholesterolemia 08/29/2018   • Morbid obesity with BMI of 45.0-49.9, adult ([facility]) 06/14/2018   • Dysuria 05/01/2014   • No significant past medical history         Objective:   There were no vitals taken for this visit.    Physical Exam:  Constitutional: Alert, no distress, well-groomed.  Skin: No rashes in visible areas.  Eye: Round. Conjunctiva clear, lids normal. No icterus.   ENMT: Lips pink without lesions, good dentition, moist mucous membranes. Phonation normal.  Neck: No masses, no thyromegaly. Moves freely without pain.  Respiratory: Unlabored respiratory effort, no cough or audible wheeze  ABD: Patient palpated all 4 abdominal quadrants.  Denies tenderness with palpation.  No tenderness when she palpates under right ribs.  Psych: Alert and oriented x3, normal affect and mood.     Assessment and Plan:   The following treatment plan was discussed:     1. Non-intractable vomiting with nausea, unspecified vomiting type  - ondansetron (ZOFRAN) 4 MG Tab tablet; Take 1 Tablet by mouth every four hours as needed for Nausea/Vomiting.  Dispense: 10 Tablet; Refill: 0  - naproxen (NAPROSYN) 500 MG Tab; Take 1 Tablet by mouth 2 times a day with meals.  Dispense: 30 Tablet; Refill: 0    2. Nonintractable headache, unspecified chronicity pattern, unspecified headache type  - naproxen (NAPROSYN) 500 MG Tab; Take 1 Tablet by mouth 2 times a day with meals.  Dispense: 30 Tablet; Refill:  0    Fortunately patient's fevers have resolved, overall symptoms are improving,  And she has no abdominal tenderness with self palpation today.  Patient advised that symptoms are most likely due to viral gastroenteritis.  Clinical suspicion for bacterial etiology is low at this time.  Clinical suspicion for emergent intra-abdominal pathology low at this time.  Zofran as needed for nausea.  Continue to push fluids and I recommend that she adhere to a brat diet for the next 7 to 10 days. Pedialyte or 50-50 water/gatorade mix.    She may take OTC Imodium as needed to help control diarrhea.  There are likely many things contributing to patient's headache to include dehydration, potential mild electrolyte imbalance, nausea/vomiting, and her viral illness in general.  We will try switching her to prescription strength naproxen instead of ibuprofen.  She may take Tylenol with this but should not take ibuprofen concurrently.    If patient's GI symptoms fail to improve in the next 3 to 5 days, new symptoms develop at any point, or symptoms worsen I would like her to be immediately reevaluated.  Additionally if patient's headache symptoms fail to improve in the next 48 hours I would also like her to be reevaluated.  Recommend that she present to clinic in person for reevaluation if necessary.    Follow-up: Follow up for failure of sx to resolve or with any questions or concerns.

## 2022-04-26 NOTE — LETTER
April 26, 2022    To Whom It May Concern:         This is confirmation that Lisa Whaley attended her scheduled appointment with Nick Conklin P.A.-C. on 4/26/22. Please excuse her from work on 4/26-4/28.         If you have any questions please do not hesitate to call me at the phone number listed below.    Sincerely,          Nick Conklin P.A.-C.  [phone]

## 2022-09-26 ENCOUNTER — TELEPHONE (OUTPATIENT)
Dept: SCHEDULING | Facility: IMAGING CENTER | Age: 32
End: 2022-09-26
Payer: COMMERCIAL

## 2022-10-10 ENCOUNTER — HOSPITAL ENCOUNTER (OUTPATIENT)
Dept: LAB | Facility: MEDICAL CENTER | Age: 32
End: 2022-10-10
Attending: PHYSICIAN ASSISTANT
Payer: COMMERCIAL

## 2022-10-10 ENCOUNTER — OFFICE VISIT (OUTPATIENT)
Dept: MEDICAL GROUP | Facility: PHYSICIAN GROUP | Age: 32
End: 2022-10-10
Payer: COMMERCIAL

## 2022-10-10 VITALS
TEMPERATURE: 97.8 F | OXYGEN SATURATION: 96 % | DIASTOLIC BLOOD PRESSURE: 68 MMHG | HEIGHT: 66 IN | SYSTOLIC BLOOD PRESSURE: 112 MMHG | RESPIRATION RATE: 18 BRPM | HEART RATE: 77 BPM | WEIGHT: 288 LBS | BODY MASS INDEX: 46.28 KG/M2

## 2022-10-10 DIAGNOSIS — E66.01 MORBID OBESITY WITH BMI OF 45.0-49.9, ADULT (HCC): ICD-10-CM

## 2022-10-10 DIAGNOSIS — E78.00 HYPERCHOLESTEROLEMIA: ICD-10-CM

## 2022-10-10 DIAGNOSIS — E55.9 VITAMIN D DEFICIENCY: ICD-10-CM

## 2022-10-10 DIAGNOSIS — Z31.9 PATIENT DESIRES PREGNANCY: ICD-10-CM

## 2022-10-10 PROBLEM — R53.82 CHRONIC FATIGUE: Status: RESOLVED | Noted: 2018-10-29 | Resolved: 2022-10-10

## 2022-10-10 LAB
25(OH)D3 SERPL-MCNC: 21 NG/ML (ref 30–100)
ALBUMIN SERPL BCP-MCNC: 4.1 G/DL (ref 3.2–4.9)
ALBUMIN/GLOB SERPL: 1.3 G/DL
ALP SERPL-CCNC: 110 U/L (ref 30–99)
ALT SERPL-CCNC: 17 U/L (ref 2–50)
ANION GAP SERPL CALC-SCNC: 12 MMOL/L (ref 7–16)
AST SERPL-CCNC: 16 U/L (ref 12–45)
BASOPHILS # BLD AUTO: 0.7 % (ref 0–1.8)
BASOPHILS # BLD: 0.09 K/UL (ref 0–0.12)
BILIRUB SERPL-MCNC: 0.4 MG/DL (ref 0.1–1.5)
BUN SERPL-MCNC: 12 MG/DL (ref 8–22)
CALCIUM SERPL-MCNC: 9.5 MG/DL (ref 8.5–10.5)
CHLORIDE SERPL-SCNC: 101 MMOL/L (ref 96–112)
CHOLEST SERPL-MCNC: 192 MG/DL (ref 100–199)
CO2 SERPL-SCNC: 23 MMOL/L (ref 20–33)
CREAT SERPL-MCNC: 0.54 MG/DL (ref 0.5–1.4)
EOSINOPHIL # BLD AUTO: 0.38 K/UL (ref 0–0.51)
EOSINOPHIL NFR BLD: 3 % (ref 0–6.9)
ERYTHROCYTE [DISTWIDTH] IN BLOOD BY AUTOMATED COUNT: 40.3 FL (ref 35.9–50)
FASTING STATUS PATIENT QL REPORTED: NORMAL
GFR SERPLBLD CREATININE-BSD FMLA CKD-EPI: 125 ML/MIN/1.73 M 2
GLOBULIN SER CALC-MCNC: 3.2 G/DL (ref 1.9–3.5)
GLUCOSE SERPL-MCNC: 90 MG/DL (ref 65–99)
HCT VFR BLD AUTO: 43.7 % (ref 37–47)
HDLC SERPL-MCNC: 64 MG/DL
HGB BLD-MCNC: 14.6 G/DL (ref 12–16)
IMM GRANULOCYTES # BLD AUTO: 0.05 K/UL (ref 0–0.11)
IMM GRANULOCYTES NFR BLD AUTO: 0.4 % (ref 0–0.9)
LDLC SERPL CALC-MCNC: 112 MG/DL
LYMPHOCYTES # BLD AUTO: 3.96 K/UL (ref 1–4.8)
LYMPHOCYTES NFR BLD: 31.6 % (ref 22–41)
MCH RBC QN AUTO: 29.5 PG (ref 27–33)
MCHC RBC AUTO-ENTMCNC: 33.4 G/DL (ref 33.6–35)
MCV RBC AUTO: 88.3 FL (ref 81.4–97.8)
MONOCYTES # BLD AUTO: 0.71 K/UL (ref 0–0.85)
MONOCYTES NFR BLD AUTO: 5.7 % (ref 0–13.4)
NEUTROPHILS # BLD AUTO: 7.35 K/UL (ref 2–7.15)
NEUTROPHILS NFR BLD: 58.6 % (ref 44–72)
NRBC # BLD AUTO: 0 K/UL
NRBC BLD-RTO: 0 /100 WBC
PLATELET # BLD AUTO: 396 K/UL (ref 164–446)
PMV BLD AUTO: 9.4 FL (ref 9–12.9)
POTASSIUM SERPL-SCNC: 4.6 MMOL/L (ref 3.6–5.5)
PROT SERPL-MCNC: 7.3 G/DL (ref 6–8.2)
RBC # BLD AUTO: 4.95 M/UL (ref 4.2–5.4)
SODIUM SERPL-SCNC: 136 MMOL/L (ref 135–145)
T4 FREE SERPL-MCNC: 1.25 NG/DL (ref 0.93–1.7)
TRIGL SERPL-MCNC: 82 MG/DL (ref 0–149)
TSH SERPL DL<=0.005 MIU/L-ACNC: 1.74 UIU/ML (ref 0.38–5.33)
WBC # BLD AUTO: 12.5 K/UL (ref 4.8–10.8)

## 2022-10-10 PROCEDURE — 85025 COMPLETE CBC W/AUTO DIFF WBC: CPT

## 2022-10-10 PROCEDURE — 82306 VITAMIN D 25 HYDROXY: CPT

## 2022-10-10 PROCEDURE — 80053 COMPREHEN METABOLIC PANEL: CPT

## 2022-10-10 PROCEDURE — 36415 COLL VENOUS BLD VENIPUNCTURE: CPT

## 2022-10-10 PROCEDURE — 99213 OFFICE O/P EST LOW 20 MIN: CPT | Performed by: PHYSICIAN ASSISTANT

## 2022-10-10 PROCEDURE — 84443 ASSAY THYROID STIM HORMONE: CPT

## 2022-10-10 PROCEDURE — 84439 ASSAY OF FREE THYROXINE: CPT

## 2022-10-10 PROCEDURE — 80061 LIPID PANEL: CPT

## 2022-10-10 ASSESSMENT — PATIENT HEALTH QUESTIONNAIRE - PHQ9: CLINICAL INTERPRETATION OF PHQ2 SCORE: 0

## 2022-10-10 ASSESSMENT — ENCOUNTER SYMPTOMS
SHORTNESS OF BREATH: 0
NAUSEA: 0
CHILLS: 0
FEVER: 0
DIARRHEA: 0
MYALGIAS: 0
VOMITING: 0

## 2022-10-10 NOTE — PROGRESS NOTES
"Subjective:     CC: To establish as a new patient.  HISTORY OF THE PRESENT ILLNESS: Patient is a 32 y.o. female. This pleasant patient is here today to establish care.    Problem   Patient Desires Pregnancy    Chronic, stable.  Patient would like to get pregnant and is requesting referral to OB/GYN.  She and her boyfriend have been trying for 6 months.     Vitamin D Deficiency    Chronic, stable.  Currently taking a MVI and prenatal.       Hypercholesterolemia    Chronic, stable.  Has never been medicated.     Morbid Obesity With Bmi of 45.0-49.9, Adult ([facility])    Chronic, stable.  Goes to the gym around 3 times/week.  Was doing great a few months ago with regards to weight loss.  Hasn't been staying on track as much lately.          Health Maintenance: Completed    ROS:   Review of Systems   Constitutional:  Negative for chills and fever.   Respiratory:  Negative for shortness of breath.    Cardiovascular:  Negative for chest pain.   Gastrointestinal:  Negative for diarrhea, nausea and vomiting.   Genitourinary:  Negative for dysuria and urgency.   Musculoskeletal:  Negative for myalgias.       Objective:       Exam: /68 (BP Location: Left arm, Patient Position: Sitting, BP Cuff Size: Large adult)   Pulse 77   Temp 36.6 °C (97.8 °F) (Temporal)   Resp 18   Ht 1.676 m (5' 6\")   Wt (!) 131 kg (288 lb)   SpO2 96%  Body mass index is 46.48 kg/m².    Physical Exam  Constitutional:       Appearance: Normal appearance.   Neck:      Comments: No thyromegaly noted.  Cardiovascular:      Rate and Rhythm: Normal rate and regular rhythm.      Heart sounds: Normal heart sounds.   Pulmonary:      Effort: Pulmonary effort is normal.      Breath sounds: Normal breath sounds.   Musculoskeletal:      Cervical back: Normal range of motion and neck supple.   Neurological:      Mental Status: She is alert.       Assessment & Plan:   32 y.o. female with the following -    1. Vitamin D deficiency  Chronic, stable.  Not currently " using supplementation.  Repeating labs.    - VITAMIN D,25 HYDROXY (DEFICIENCY); Future    2. Hypercholesterolemia  Chronic, stable.  Has never been medicated.  Repeating cholesterol panel.    - Lipid Profile; Future    3. Morbid obesity with BMI of 45.0-49.9, adult ([facility])  Chronic, stable.  Goes to the gym 3 days a week.  Tries to eat healthy.    - CBC WITH DIFFERENTIAL; Future  - Comp Metabolic Panel; Future  - FREE THYROXINE; Future  - TSH; Future    4. Patient desires pregnancy  Referral placed for OB/GYN.    - Referral to OB/Gyn      Return in about 1 month (around 11/10/2022) for discuss labs.    Please note that this dictation was created using voice recognition software. I have made every reasonable attempt to correct obvious errors, but I expect that there are errors of grammar and possibly content that I did not discover before finalizing the note.

## 2022-10-25 ENCOUNTER — OFFICE VISIT (OUTPATIENT)
Dept: MEDICAL GROUP | Facility: PHYSICIAN GROUP | Age: 32
End: 2022-10-25
Payer: COMMERCIAL

## 2022-10-25 VITALS
TEMPERATURE: 97.5 F | RESPIRATION RATE: 16 BRPM | HEIGHT: 66 IN | SYSTOLIC BLOOD PRESSURE: 110 MMHG | BODY MASS INDEX: 46.79 KG/M2 | DIASTOLIC BLOOD PRESSURE: 64 MMHG | WEIGHT: 291.13 LBS | HEART RATE: 85 BPM | OXYGEN SATURATION: 93 %

## 2022-10-25 DIAGNOSIS — D72.829 LEUKOCYTOSIS, UNSPECIFIED TYPE: ICD-10-CM

## 2022-10-25 DIAGNOSIS — E78.5 DYSLIPIDEMIA: ICD-10-CM

## 2022-10-25 DIAGNOSIS — E55.9 VITAMIN D DEFICIENCY: ICD-10-CM

## 2022-10-25 DIAGNOSIS — R74.8 ELEVATED ALKALINE PHOSPHATASE LEVEL: ICD-10-CM

## 2022-10-25 PROBLEM — E78.00 HYPERCHOLESTEROLEMIA: Status: RESOLVED | Noted: 2018-08-29 | Resolved: 2022-10-25

## 2022-10-25 PROCEDURE — 99214 OFFICE O/P EST MOD 30 MIN: CPT | Performed by: PHYSICIAN ASSISTANT

## 2022-10-25 ASSESSMENT — ENCOUNTER SYMPTOMS
CHILLS: 0
SHORTNESS OF BREATH: 0
FEVER: 0

## 2022-10-25 ASSESSMENT — FIBROSIS 4 INDEX: FIB4 SCORE: 0.31

## 2022-10-25 NOTE — PROGRESS NOTES
"Subjective:     CC: Follow-up    HPI:   Lisa presents today with    Problem   Dyslipidemia    Chronic, stable.  Latest Labs:   Lab Results   Component Value Date/Time    CHOLSTRLTOT 192 10/10/2022 11:48 AM     (H) 10/10/2022 11:48 AM    HDL 64 10/10/2022 11:48 AM    TRIGLYCERIDE 82 10/10/2022 11:48 AM      Medications: None  Medication side effects:   Diet/Exercise: Working on it  Family History of high cholesterol or heart disease?  Yes, father had an MI  Risk calculator: Patient is younger than 40       Vitamin D Deficiency    Chronic, uncontrolled.  Continue taking multivitamin.  Recommend adding additional OTC vitamin D, 2000 IU daily.           Health Maintenance: Completed    ROS:  Review of Systems   Constitutional:  Negative for chills and fever.   Respiratory:  Negative for shortness of breath.    Cardiovascular:  Negative for chest pain.     Objective:     Exam:  /64 (BP Location: Left arm, Patient Position: Sitting, BP Cuff Size: Large adult)   Pulse 85   Temp 36.4 °C (97.5 °F) (Temporal)   Resp 16   Ht 1.676 m (5' 6\")   Wt (!) 132 kg (291 lb 2 oz)   LMP 10/13/2022 (Exact Date)   SpO2 93%   Breastfeeding No   BMI 46.99 kg/m²  Body mass index is 46.99 kg/m².    Physical Exam  Constitutional:       Appearance: Normal appearance.   Cardiovascular:      Rate and Rhythm: Normal rate and regular rhythm.      Heart sounds: Normal heart sounds.   Pulmonary:      Effort: Pulmonary effort is normal.      Breath sounds: Normal breath sounds.   Musculoskeletal:      Cervical back: Normal range of motion and neck supple.   Skin:     General: Skin is warm.   Neurological:      General: No focal deficit present.      Mental Status: She is alert.   Psychiatric:         Mood and Affect: Mood normal.         Assessment & Plan:     32 y.o. female with the following -     1. Dyslipidemia  Chronic, stable.  Discussed increasing plant-based foods, decreasing animal-based foods.  Increase daily " activity, aiming for 30-45 minutes brisk exercise daily.    - Lipid Profile; Future    2. Elevated alkaline phosphatase level  New finding.  Repeating labs.    - Comp Metabolic Panel; Future  - GAMMA GT (GGT); Future  - 5' NUCLEOTIDASE; Future    3. Leukocytosis, unspecified type  New finding.  Repeating labs.    - CBC WITH DIFFERENTIAL; Future    4. Vitamin D deficiency  Chronic, uncontrolled.  Starting OTC vitamin D 2000 IU daily.    - VITAMIN D,25 HYDROXY (DEFICIENCY); Future        Return in about 6 months (around 4/25/2023) for discuss labs.    Please note that this dictation was created using voice recognition software. I have made every reasonable attempt to correct obvious errors, but I expect that there are errors of grammar and possibly content that I did not discover before finalizing the note.

## 2022-12-23 ENCOUNTER — HOSPITAL ENCOUNTER (OUTPATIENT)
Dept: LAB | Facility: MEDICAL CENTER | Age: 32
End: 2022-12-23
Attending: OBSTETRICS & GYNECOLOGY
Payer: COMMERCIAL

## 2022-12-23 LAB
ABO GROUP BLD: NORMAL
APPEARANCE UR: ABNORMAL
B-HCG SERPL-ACNC: ABNORMAL MIU/ML (ref 0–5)
BACTERIA #/AREA URNS HPF: ABNORMAL /HPF
BASOPHILS # BLD AUTO: 1 % (ref 0–1.8)
BASOPHILS # BLD: 0.11 K/UL (ref 0–0.12)
BILIRUB UR QL STRIP.AUTO: NEGATIVE
BLD GP AB SCN SERPL QL: NORMAL
COLOR UR: YELLOW
EOSINOPHIL # BLD AUTO: 0.21 K/UL (ref 0–0.51)
EOSINOPHIL NFR BLD: 1.9 % (ref 0–6.9)
EPI CELLS #/AREA URNS HPF: ABNORMAL /HPF
ERYTHROCYTE [DISTWIDTH] IN BLOOD BY AUTOMATED COUNT: 41.1 FL (ref 35.9–50)
GLUCOSE UR STRIP.AUTO-MCNC: NEGATIVE MG/DL
HBV SURFACE AG SER QL: ABNORMAL
HCT VFR BLD AUTO: 44.1 % (ref 37–47)
HCV AB SER QL: NORMAL
HGB BLD-MCNC: 14.7 G/DL (ref 12–16)
HIV 1+2 AB+HIV1 P24 AG SERPL QL IA: NORMAL
HYALINE CASTS #/AREA URNS LPF: ABNORMAL /LPF
IMM GRANULOCYTES # BLD AUTO: 0.04 K/UL (ref 0–0.11)
IMM GRANULOCYTES NFR BLD AUTO: 0.4 % (ref 0–0.9)
KETONES UR STRIP.AUTO-MCNC: ABNORMAL MG/DL
LEUKOCYTE ESTERASE UR QL STRIP.AUTO: ABNORMAL
LYMPHOCYTES # BLD AUTO: 3.38 K/UL (ref 1–4.8)
LYMPHOCYTES NFR BLD: 29.9 % (ref 22–41)
MCH RBC QN AUTO: 29.3 PG (ref 27–33)
MCHC RBC AUTO-ENTMCNC: 33.3 G/DL (ref 33.6–35)
MCV RBC AUTO: 87.8 FL (ref 81.4–97.8)
MICRO URNS: ABNORMAL
MONOCYTES # BLD AUTO: 0.69 K/UL (ref 0–0.85)
MONOCYTES NFR BLD AUTO: 6.1 % (ref 0–13.4)
NEUTROPHILS # BLD AUTO: 6.88 K/UL (ref 2–7.15)
NEUTROPHILS NFR BLD: 60.7 % (ref 44–72)
NITRITE UR QL STRIP.AUTO: NEGATIVE
NRBC # BLD AUTO: 0 K/UL
NRBC BLD-RTO: 0 /100 WBC
PH UR STRIP.AUTO: 5 [PH] (ref 5–8)
PLATELET # BLD AUTO: 409 K/UL (ref 164–446)
PMV BLD AUTO: 9.2 FL (ref 9–12.9)
PROT UR QL STRIP: NEGATIVE MG/DL
RBC # BLD AUTO: 5.02 M/UL (ref 4.2–5.4)
RBC # URNS HPF: ABNORMAL /HPF
RBC UR QL AUTO: ABNORMAL
RH BLD: NORMAL
RUBV AB SER QL: 16 IU/ML
SP GR UR STRIP.AUTO: 1.02
T PALLIDUM AB SER QL IA: ABNORMAL
UROBILINOGEN UR STRIP.AUTO-MCNC: 0.2 MG/DL
WBC # BLD AUTO: 11.3 K/UL (ref 4.8–10.8)
WBC #/AREA URNS HPF: ABNORMAL /HPF

## 2022-12-23 PROCEDURE — 85025 COMPLETE CBC W/AUTO DIFF WBC: CPT

## 2022-12-23 PROCEDURE — 86900 BLOOD TYPING SEROLOGIC ABO: CPT

## 2022-12-23 PROCEDURE — 81244 FMR1 GEN ALYS CHARAC ALLELES: CPT

## 2022-12-23 PROCEDURE — 36415 COLL VENOUS BLD VENIPUNCTURE: CPT

## 2022-12-23 PROCEDURE — 86850 RBC ANTIBODY SCREEN: CPT

## 2022-12-23 PROCEDURE — 81001 URINALYSIS AUTO W/SCOPE: CPT

## 2022-12-23 PROCEDURE — 86592 SYPHILIS TEST NON-TREP QUAL: CPT

## 2022-12-23 PROCEDURE — 81220 CFTR GENE COM VARIANTS: CPT

## 2022-12-23 PROCEDURE — 86901 BLOOD TYPING SEROLOGIC RH(D): CPT

## 2022-12-23 PROCEDURE — 81329 SMN1 GENE DOS/DELETION ALYS: CPT

## 2022-12-23 PROCEDURE — 84702 CHORIONIC GONADOTROPIN TEST: CPT

## 2022-12-23 PROCEDURE — 81243 FMR1 GEN ALY DETC ABNL ALLEL: CPT

## 2022-12-23 PROCEDURE — 86762 RUBELLA ANTIBODY: CPT

## 2022-12-23 PROCEDURE — 87389 HIV-1 AG W/HIV-1&-2 AB AG IA: CPT

## 2022-12-23 PROCEDURE — 87340 HEPATITIS B SURFACE AG IA: CPT

## 2022-12-23 PROCEDURE — 86780 TREPONEMA PALLIDUM: CPT

## 2022-12-23 PROCEDURE — 86803 HEPATITIS C AB TEST: CPT

## 2022-12-31 LAB
FMR1 ALLELE 2 CGG RPT ENTNUM BLD/T: 30 CGG REPEATS
FMR1 ALLELE1 CGG RPT ENTNUM BLD/T: 30 CGG REPEATS
FMR1 GENE MUT ANL BLD/T: NORMAL
FMR1 GENE MUT ANL BLD/T: NORMAL

## 2023-01-01 LAB
CF EXPANDED VARIANT PANEL INTERP Q4864: NORMAL
CFTR ALLELE 1 BLD/T QL: NEGATIVE
CFTR ALLELE 1 BLD/T QL: NEGATIVE
CFTR MUT ANL BLD/T: NORMAL

## 2023-01-04 LAB
GEN STRUCT VAR COPY NUM: NORMAL
SMN1 GENE MUT ANL BLD/T: NORMAL
SMN1+SMN2 GENE MUT ANL BLD/T: NORMAL
SMN2 GENE MUT ANL BLD/T: NORMAL
SPECIMEN SOURCE: NORMAL
SYMPTOM: NORMAL

## 2023-01-17 ENCOUNTER — HOSPITAL ENCOUNTER (OUTPATIENT)
Facility: MEDICAL CENTER | Age: 33
End: 2023-01-17
Attending: OBSTETRICS & GYNECOLOGY
Payer: COMMERCIAL

## 2023-01-17 ENCOUNTER — HOSPITAL ENCOUNTER (OUTPATIENT)
Dept: LAB | Facility: MEDICAL CENTER | Age: 33
End: 2023-01-17
Attending: OBSTETRICS & GYNECOLOGY
Payer: COMMERCIAL

## 2023-01-17 PROCEDURE — 81420 FETAL CHRMOML ANEUPLOIDY: CPT

## 2023-01-17 PROCEDURE — 87491 CHLMYD TRACH DNA AMP PROBE: CPT

## 2023-01-17 PROCEDURE — 87624 HPV HI-RISK TYP POOLED RSLT: CPT

## 2023-01-17 PROCEDURE — 87591 N.GONORRHOEAE DNA AMP PROB: CPT

## 2023-01-17 PROCEDURE — 88175 CYTOPATH C/V AUTO FLUID REDO: CPT

## 2023-01-17 PROCEDURE — 87661 TRICHOMONAS VAGINALIS AMPLIF: CPT

## 2023-01-17 PROCEDURE — 36415 COLL VENOUS BLD VENIPUNCTURE: CPT

## 2023-01-18 PROCEDURE — 88175 CYTOPATH C/V AUTO FLUID REDO: CPT

## 2023-01-19 LAB
C TRACH DNA GENITAL QL NAA+PROBE: NEGATIVE
CYTOLOGY REG CYTOL: ABNORMAL
HPV HR 12 DNA CVX QL NAA+PROBE: POSITIVE
HPV16 DNA SPEC QL NAA+PROBE: NEGATIVE
HPV18 DNA SPEC QL NAA+PROBE: NEGATIVE
N GONORRHOEA DNA GENITAL QL NAA+PROBE: NEGATIVE
SPECIMEN SOURCE: ABNORMAL
SPECIMEN SOURCE: ABNORMAL

## 2023-01-21 LAB
SPEC CONTAINER SPEC: NORMAL
SPECIMEN SOURCE: NORMAL
T VAGINALIS RRNA SPEC QL NAA+PROBE: NEGATIVE

## 2023-01-25 LAB
ANNOTATION COMMENT IMP: NORMAL
FET CHR X + Y ANEUP RISK PLAS.CFDNA QN: NORMAL
FET SEX US: NORMAL
FET TS 13 RISK PLAS.CFDNA QL: NORMAL
FET TS 18 RISK PLAS.CFDNA QL: NORMAL
FET TS 21 RISK PLAS.CFDNA QL: NORMAL
FETAL ANEUPLOIDY - TRIPLOIDY NV11651: NORMAL
FETAL FRACTION Q0204: 3.1 %
GA (DAYS): 2 D
GA (WEEKS): 10 WK
NUMBER OF FETUSES Q0671: 1
PATHOLOGY STUDY: NORMAL
REPORT FETAL SEX NL11652: YES

## 2023-02-17 ENCOUNTER — TELEMEDICINE (OUTPATIENT)
Dept: TELEHEALTH | Facility: TELEMEDICINE | Age: 33
End: 2023-02-17
Payer: COMMERCIAL

## 2023-02-17 DIAGNOSIS — Z3A.15 15 WEEKS GESTATION OF PREGNANCY: ICD-10-CM

## 2023-02-17 DIAGNOSIS — R68.89 FLU-LIKE SYMPTOMS: ICD-10-CM

## 2023-02-17 PROCEDURE — 99213 OFFICE O/P EST LOW 20 MIN: CPT | Mod: 95 | Performed by: PHYSICIAN ASSISTANT

## 2023-02-17 NOTE — LETTER
VIRTUAL VISIT  [facility]  [id number]     February 17, 2023    Patient: Lisa Whaley   YOB: 1990   Date of Visit: 2/17/2023       To Whom It May Concern:    Lisa Whaley was seen and treated in our department on 2/17/2023. Please excuse from work on 2/16/2023-2/18/2023, can return thereafter.    Sincerely,     Deshaun Matos P.A.-C.

## 2023-03-06 ENCOUNTER — EH NON-PROVIDER (OUTPATIENT)
Dept: OCCUPATIONAL MEDICINE | Facility: CLINIC | Age: 33
End: 2023-03-06

## 2023-03-06 ENCOUNTER — HOSPITAL ENCOUNTER (OUTPATIENT)
Dept: LAB | Facility: MEDICAL CENTER | Age: 33
End: 2023-03-06
Attending: OBSTETRICS & GYNECOLOGY
Payer: COMMERCIAL

## 2023-03-06 DIAGNOSIS — Z02.89 ENCOUNTER FOR OCCUPATIONAL HEALTH ASSESSMENT: ICD-10-CM

## 2023-03-06 PROCEDURE — 82105 ALPHA-FETOPROTEIN SERUM: CPT

## 2023-03-06 PROCEDURE — 36415 COLL VENOUS BLD VENIPUNCTURE: CPT

## 2023-03-06 PROCEDURE — 94375 RESPIRATORY FLOW VOLUME LOOP: CPT | Performed by: NURSE PRACTITIONER

## 2023-03-08 LAB
# FETUSES US: NORMAL
AFP MOM SERPL: 1
AFP SERPL-MCNC: 25 NG/ML
AGE - REPORTED: 32.9 YR
CURRENT SMOKER: NO
FAMILY MEMBER DISEASES HX: NO
GA METHOD: NORMAL
GA: NORMAL WK
IDDM PATIENT QL: NO
INTEGRATED SCN PATIENT-IMP: NORMAL
SPECIMEN DRAWN SERPL: NORMAL

## 2023-03-27 ENCOUNTER — ANESTHESIA (OUTPATIENT)
Dept: OBGYN | Facility: MEDICAL CENTER | Age: 33
End: 2023-03-27
Payer: COMMERCIAL

## 2023-03-27 ENCOUNTER — HOSPITAL ENCOUNTER (OUTPATIENT)
Facility: MEDICAL CENTER | Age: 33
End: 2023-03-27
Attending: OBSTETRICS & GYNECOLOGY | Admitting: OBSTETRICS & GYNECOLOGY
Payer: COMMERCIAL

## 2023-03-27 ENCOUNTER — ANESTHESIA EVENT (OUTPATIENT)
Dept: OBGYN | Facility: MEDICAL CENTER | Age: 33
End: 2023-03-27
Payer: COMMERCIAL

## 2023-03-27 VITALS
SYSTOLIC BLOOD PRESSURE: 141 MMHG | WEIGHT: 290 LBS | OXYGEN SATURATION: 95 % | HEIGHT: 66 IN | TEMPERATURE: 97.2 F | BODY MASS INDEX: 46.61 KG/M2 | DIASTOLIC BLOOD PRESSURE: 65 MMHG | RESPIRATION RATE: 16 BRPM | HEART RATE: 92 BPM

## 2023-03-27 PROBLEM — N88.3 CERVICAL INCOMPETENCE: Status: ACTIVE | Noted: 2023-03-27

## 2023-03-27 LAB
ERYTHROCYTE [DISTWIDTH] IN BLOOD BY AUTOMATED COUNT: 41.5 FL (ref 35.9–50)
HCT VFR BLD AUTO: 38.8 % (ref 37–47)
HGB BLD-MCNC: 13 G/DL (ref 12–16)
HOLDING TUBE BB 8507: NORMAL
MCH RBC QN AUTO: 28.9 PG (ref 27–33)
MCHC RBC AUTO-ENTMCNC: 33.5 G/DL (ref 33.6–35)
MCV RBC AUTO: 86.2 FL (ref 81.4–97.8)
PLATELET # BLD AUTO: 316 K/UL (ref 164–446)
PMV BLD AUTO: 9.2 FL (ref 9–12.9)
RBC # BLD AUTO: 4.5 M/UL (ref 4.2–5.4)
WBC # BLD AUTO: 16.7 K/UL (ref 4.8–10.8)

## 2023-03-27 PROCEDURE — 700111 HCHG RX REV CODE 636 W/ 250 OVERRIDE (IP): Performed by: OBSTETRICS & GYNECOLOGY

## 2023-03-27 PROCEDURE — 160009 HCHG ANES TIME/MIN: Performed by: OBSTETRICS & GYNECOLOGY

## 2023-03-27 PROCEDURE — 700101 HCHG RX REV CODE 250: Performed by: ANESTHESIOLOGY

## 2023-03-27 PROCEDURE — 160035 HCHG PACU - 1ST 60 MINS PHASE I: Performed by: OBSTETRICS & GYNECOLOGY

## 2023-03-27 PROCEDURE — 160002 HCHG RECOVERY MINUTES (STAT): Performed by: OBSTETRICS & GYNECOLOGY

## 2023-03-27 PROCEDURE — 96365 THER/PROPH/DIAG IV INF INIT: CPT

## 2023-03-27 PROCEDURE — 00948 ANES VAG PX CRV CERCLAGE: CPT | Performed by: ANESTHESIOLOGY

## 2023-03-27 PROCEDURE — 700111 HCHG RX REV CODE 636 W/ 250 OVERRIDE (IP): Performed by: ANESTHESIOLOGY

## 2023-03-27 PROCEDURE — 160039 HCHG SURGERY MINUTES - EA ADDL 1 MIN LEVEL 3: Performed by: OBSTETRICS & GYNECOLOGY

## 2023-03-27 PROCEDURE — 700105 HCHG RX REV CODE 258: Performed by: ANESTHESIOLOGY

## 2023-03-27 PROCEDURE — 700105 HCHG RX REV CODE 258: Performed by: OBSTETRICS & GYNECOLOGY

## 2023-03-27 PROCEDURE — 700101 HCHG RX REV CODE 250: Performed by: OBSTETRICS & GYNECOLOGY

## 2023-03-27 PROCEDURE — 160028 HCHG SURGERY MINUTES - 1ST 30 MINS LEVEL 3: Performed by: OBSTETRICS & GYNECOLOGY

## 2023-03-27 PROCEDURE — 160048 HCHG OR STATISTICAL LEVEL 1-5: Performed by: OBSTETRICS & GYNECOLOGY

## 2023-03-27 PROCEDURE — 36415 COLL VENOUS BLD VENIPUNCTURE: CPT

## 2023-03-27 PROCEDURE — 85027 COMPLETE CBC AUTOMATED: CPT

## 2023-03-27 RX ORDER — ACETAMINOPHEN 500 MG
1000 TABLET ORAL EVERY 4 HOURS PRN
Status: DISCONTINUED | OUTPATIENT
Start: 2023-03-27 | End: 2023-03-28 | Stop reason: HOSPADM

## 2023-03-27 RX ORDER — ONDANSETRON 2 MG/ML
4 INJECTION INTRAMUSCULAR; INTRAVENOUS
Status: DISCONTINUED | OUTPATIENT
Start: 2023-03-27 | End: 2023-03-28 | Stop reason: HOSPADM

## 2023-03-27 RX ORDER — EPHEDRINE SULFATE 50 MG/ML
5 INJECTION, SOLUTION INTRAVENOUS
Status: DISCONTINUED | OUTPATIENT
Start: 2023-03-27 | End: 2023-03-28 | Stop reason: HOSPADM

## 2023-03-27 RX ORDER — HALOPERIDOL 5 MG/ML
1 INJECTION INTRAMUSCULAR
Status: DISCONTINUED | OUTPATIENT
Start: 2023-03-27 | End: 2023-03-28 | Stop reason: HOSPADM

## 2023-03-27 RX ORDER — HYDROMORPHONE HYDROCHLORIDE 1 MG/ML
0.2 INJECTION, SOLUTION INTRAMUSCULAR; INTRAVENOUS; SUBCUTANEOUS
Status: DISCONTINUED | OUTPATIENT
Start: 2023-03-27 | End: 2023-03-28 | Stop reason: HOSPADM

## 2023-03-27 RX ORDER — SODIUM CHLORIDE, SODIUM LACTATE, POTASSIUM CHLORIDE, CALCIUM CHLORIDE 600; 310; 30; 20 MG/100ML; MG/100ML; MG/100ML; MG/100ML
INJECTION, SOLUTION INTRAVENOUS CONTINUOUS
Status: DISCONTINUED | OUTPATIENT
Start: 2023-03-27 | End: 2023-03-27

## 2023-03-27 RX ORDER — OXYCODONE HCL 5 MG/5 ML
5 SOLUTION, ORAL ORAL
Status: DISCONTINUED | OUTPATIENT
Start: 2023-03-27 | End: 2023-03-28 | Stop reason: HOSPADM

## 2023-03-27 RX ORDER — METOCLOPRAMIDE HYDROCHLORIDE 5 MG/ML
10 INJECTION INTRAMUSCULAR; INTRAVENOUS ONCE
Status: DISCONTINUED | OUTPATIENT
Start: 2023-03-27 | End: 2023-03-27

## 2023-03-27 RX ORDER — DIPHENHYDRAMINE HYDROCHLORIDE 50 MG/ML
12.5 INJECTION INTRAMUSCULAR; INTRAVENOUS
Status: DISCONTINUED | OUTPATIENT
Start: 2023-03-27 | End: 2023-03-28 | Stop reason: HOSPADM

## 2023-03-27 RX ORDER — AZITHROMYCIN 500 MG/5ML
500 INJECTION, POWDER, LYOPHILIZED, FOR SOLUTION INTRAVENOUS ONCE
Status: COMPLETED | OUTPATIENT
Start: 2023-03-27 | End: 2023-03-27

## 2023-03-27 RX ORDER — HYDROMORPHONE HYDROCHLORIDE 1 MG/ML
0.1 INJECTION, SOLUTION INTRAMUSCULAR; INTRAVENOUS; SUBCUTANEOUS
Status: DISCONTINUED | OUTPATIENT
Start: 2023-03-27 | End: 2023-03-28 | Stop reason: HOSPADM

## 2023-03-27 RX ORDER — BUPIVACAINE HYDROCHLORIDE 7.5 MG/ML
INJECTION, SOLUTION INTRASPINAL
Status: COMPLETED | OUTPATIENT
Start: 2023-03-27 | End: 2023-03-27

## 2023-03-27 RX ORDER — CEFAZOLIN SODIUM 1 G/3ML
INJECTION, POWDER, FOR SOLUTION INTRAMUSCULAR; INTRAVENOUS PRN
Status: DISCONTINUED | OUTPATIENT
Start: 2023-03-27 | End: 2023-03-27 | Stop reason: SURG

## 2023-03-27 RX ORDER — METOPROLOL TARTRATE 1 MG/ML
1 INJECTION, SOLUTION INTRAVENOUS
Status: DISCONTINUED | OUTPATIENT
Start: 2023-03-27 | End: 2023-03-28 | Stop reason: HOSPADM

## 2023-03-27 RX ORDER — HYDROMORPHONE HYDROCHLORIDE 1 MG/ML
0.4 INJECTION, SOLUTION INTRAMUSCULAR; INTRAVENOUS; SUBCUTANEOUS
Status: DISCONTINUED | OUTPATIENT
Start: 2023-03-27 | End: 2023-03-28 | Stop reason: HOSPADM

## 2023-03-27 RX ORDER — OXYCODONE HCL 5 MG/5 ML
10 SOLUTION, ORAL ORAL
Status: DISCONTINUED | OUTPATIENT
Start: 2023-03-27 | End: 2023-03-28 | Stop reason: HOSPADM

## 2023-03-27 RX ORDER — ONDANSETRON 2 MG/ML
INJECTION INTRAMUSCULAR; INTRAVENOUS PRN
Status: DISCONTINUED | OUTPATIENT
Start: 2023-03-27 | End: 2023-03-27 | Stop reason: SURG

## 2023-03-27 RX ORDER — CITRIC ACID/SODIUM CITRATE 334-500MG
30 SOLUTION, ORAL ORAL ONCE
Status: DISCONTINUED | OUTPATIENT
Start: 2023-03-27 | End: 2023-03-27

## 2023-03-27 RX ORDER — SODIUM CHLORIDE, SODIUM GLUCONATE, SODIUM ACETATE, POTASSIUM CHLORIDE AND MAGNESIUM CHLORIDE 526; 502; 368; 37; 30 MG/100ML; MG/100ML; MG/100ML; MG/100ML; MG/100ML
1500 INJECTION, SOLUTION INTRAVENOUS ONCE
Status: DISCONTINUED | OUTPATIENT
Start: 2023-03-27 | End: 2023-03-27

## 2023-03-27 RX ORDER — ASPIRIN 81 MG/1
81 TABLET, CHEWABLE ORAL DAILY
Status: ON HOLD | COMMUNITY
End: 2023-08-17

## 2023-03-27 RX ADMIN — BUPIVACAINE HYDROCHLORIDE IN DEXTROSE 1.5 ML: 7.5 INJECTION, SOLUTION SUBARACHNOID at 18:46

## 2023-03-27 RX ADMIN — AZITHROMYCIN MONOHYDRATE 500 MG: 500 INJECTION, POWDER, LYOPHILIZED, FOR SOLUTION INTRAVENOUS at 19:16

## 2023-03-27 RX ADMIN — AZITHROMYCIN MONOHYDRATE 500 MG: 500 INJECTION, POWDER, LYOPHILIZED, FOR SOLUTION INTRAVENOUS at 19:11

## 2023-03-27 RX ADMIN — ONDANSETRON 4 MG: 2 INJECTION INTRAMUSCULAR; INTRAVENOUS at 19:25

## 2023-03-27 RX ADMIN — CEFAZOLIN 3 G: 330 INJECTION, POWDER, FOR SOLUTION INTRAMUSCULAR; INTRAVENOUS at 18:59

## 2023-03-27 RX ADMIN — SODIUM CHLORIDE, POTASSIUM CHLORIDE, SODIUM LACTATE AND CALCIUM CHLORIDE: 600; 310; 30; 20 INJECTION, SOLUTION INTRAVENOUS at 18:38

## 2023-03-27 ASSESSMENT — FIBROSIS 4 INDEX: FIB4 SCORE: 0.3

## 2023-03-27 ASSESSMENT — COPD QUESTIONNAIRES
DO YOU EVER COUGH UP ANY MUCUS OR PHLEGM?: NO/ONLY WITH OCCASIONAL COLDS OR INFECTIONS
IN THE PAST 12 MONTHS DO YOU DO LESS THAN YOU USED TO BECAUSE OF YOUR BREATHING PROBLEMS: DISAGREE/UNSURE
DURING THE PAST 4 WEEKS HOW MUCH DID YOU FEEL SHORT OF BREATH: NONE/LITTLE OF THE TIME

## 2023-03-27 ASSESSMENT — PATIENT HEALTH QUESTIONNAIRE - PHQ9
2. FEELING DOWN, DEPRESSED, IRRITABLE, OR HOPELESS: NOT AT ALL
1. LITTLE INTEREST OR PLEASURE IN DOING THINGS: NOT AT ALL
SUM OF ALL RESPONSES TO PHQ9 QUESTIONS 1 AND 2: 0

## 2023-03-27 ASSESSMENT — PAIN SCALES - GENERAL
PAINLEVEL: 0 - NO PAIN
PAIN_LEVEL: 0

## 2023-03-27 ASSESSMENT — LIFESTYLE VARIABLES
TOTAL SCORE: 0
EVER FELT BAD OR GUILTY ABOUT YOUR DRINKING: NO
EVER HAD A DRINK FIRST THING IN THE MORNING TO STEADY YOUR NERVES TO GET RID OF A HANGOVER: NO
HAVE YOU EVER FELT YOU SHOULD CUT DOWN ON YOUR DRINKING: NO
CONSUMPTION TOTAL: INCOMPLETE
DOES PATIENT WANT TO STOP DRINKING: NO
ALCOHOL_USE: NO
EVER_SMOKED: NEVER
HAVE PEOPLE ANNOYED YOU BY CRITICIZING YOUR DRINKING: NO
TOTAL SCORE: 0
TOTAL SCORE: 0

## 2023-03-28 NOTE — ANESTHESIA PROCEDURE NOTES
Spinal Block    Date/Time: 3/27/2023 6:46 PM  Performed by: Lloyd Connors M.D.  Authorized by: Lloyd Connors M.D.     Start Time:  3/27/2023 6:46 PM  End Time:  3/27/2023 6:51 PM  Reason for Block: primary anesthetic    patient identified, IV checked, site marked, risks and benefits discussed, surgical consent, monitors and equipment checked, pre-op evaluation and timeout performed    Patient Position:  Sitting  Prep: ChloraPrep, patient draped and sterile technique    Monitoring:  Blood pressure, continuous pulse oximetry and heart rate  Approach:  Midline  Location:  L3-4  Injection Technique:  Single-shot  Skin infiltration:  Lidocaine  Strength:  1%  Dose:  3ml  Needle Type:  Pencan  Needle Gauge:  25 G  CSF flowing pre/post injection:  Yes  Sensory Level:  T10

## 2023-03-28 NOTE — ANESTHESIA TIME REPORT
Anesthesia Start and Stop Event Times     Date Time Event    3/27/2023 1803 Ready for Procedure     1838 Anesthesia Start     1942 Anesthesia Stop        Responsible Staff  03/27/23    Name Role Begin End    Lloyd Connors M.D. Anesth 1838 1942        Overtime Reason:  no overtime (within assigned shift)    Comments:

## 2023-03-28 NOTE — OP REPORT
DATE OF SERVICE:  03/27/2023     PREPROCEDURE DIAGNOSES:  1.  Intrauterine pregnancy at 20 weeks and 1 day.  2.  Cervical funneling, shortening and dilatation.  3.  Probable cervical incompetence.     POSTOPERATIVE DIAGNOSES:    1.  Intrauterine pregnancy at 20 weeks and 1 day.  2.  Cervical funneling, shortening and dilatation.  3.  Probable cervical incompetence.     PROCEDURE:  Cervical cerclage.     SURGEON:  Celestine Wang MD     ANESTHESIA:  Spinal.     ANESTHESIOLOGIST:  Lloyd Connors MD     OPERATIVE FINDINGS:  Cervix dilated 1 cm soft, 70% effacement.  Ultrasound   indicated cervical funneling and shortening.     DESCRIPTION OF PROCEDURE:  After induction of spinal anesthetic, the patient   was placed in dorsal lithotomy position, prepped and draped in the usual   manner for vaginal procedure.  Appropriate timeouts were called.  The patient   did receive 2 grams Ancef preoperatively and azithromycin 500 mg   intraoperatively.     The bladder was emptied with a straight catheter approximately 30 mL.  Right   angle retractor was then placed and the cervix were prepped with Betadine   under direct vision.  Cervix found to be approximately 1 cm dilated and the   cervix was stabilized at 12 and 6 o'clock with 2 ring forceps.  Paul catheter   with a Paul tip trimmed was then introduced into the cervix and inflated to   30 mL and pushed superiorly.  Circumferential placement of 5 mm Mersilene tape   was then carried out and the suture needle was then removed.  Suture was then   cinched down as tightly as possible.  The Paul catheter balloon was then   drained and withdrawn.  Suture was then tightened further until cervix was   felt to be closed.  Multiple throws were then placed and excess suture was   trimmed.  Examination with ultrasound revealed adequate cervical cerclage   placement.  The vagina was irrigated.  There appeared to be adequate   hemostasis in color to the cervix.  The patient tolerated the  procedure well.    EBL 5 mL.  The patient returned to recovery in satisfactory condition.  All   sponge and needle counts correct.        ______________________________  MD SHARIFA VENTURA/IAN    DD:  03/27/2023 19:43  DT:  03/27/2023 20:00    Job#:  [id number]

## 2023-03-28 NOTE — OR SURGEON
Immediate Post OP Note    PreOp Diagnosis: IUP 20 1/7 weeks                               Cervical funneling, shortening and dilatation.                               Probable cervical incompetence.      PostOp Diagnosis: Same      Procedure(s):  CERCLAGE, CERVIX    Surgeon(s):  Celestine Wang M.D.    Anesthesiologist/Type of Anesthesia:  Anesthesiologist: Lloyd Connors M.D./Spinal    Surgical Staff:  * No surgical staff found *    None    Estimated Blood Loss: 5 ml    Findings: Cx dilated 1 cm., soft, 70% effacement.    Complications: None        3/27/2023 7:38 PM Celestine Wang M.D.

## 2023-03-28 NOTE — PROGRESS NOTES
Pt able to eat without any nausea or vomiting. Ambulated well to bathroom with stand by assist. Voided 300ml yellow urine. IV discontinued. Discharge instructions reviewed with pt and sig other. Cerclage after care reviewed, PTL signs and symptoms reviewed. All questions answered. Has follow up visit scheduled for Wednesday. Discharged home ambulatory at this time

## 2023-03-28 NOTE — PROGRESS NOTES
1745 Report received from CHASE Prasad. POC discussed, questions answered.     1835 Ambulated from S234 to OR3 in stable condition.    1940 Transferred to antepartum unit in labor bed with side rails up in stable condition. Orders received from MD Felipe and POC discussed.     2155 Report given to CHASE Wong. POC discussed, questions answered.

## 2023-03-28 NOTE — H&P
DATE OF ADMISSION:  2023     REASON FOR ADMISSION:  Physical exam indicated cerclage.     HISTORY OF PRESENT ILLNESS:  This is a 32-year-old , EDC 2023, now   20 weeks and 1 day, who was sent for a detailed anatomy scan.  The patient   during the examination was found to have a very short cervix with funneling   with the membranes progressing down the cervical canal.  Although there were   no dynamic changes, the remaining cervical length was 11.8 mm with fundal   width of 8.3 mm and funnel length of 14.8 mm.  Pelvic examination revealed the   cervix to be almost 1 cm dilated, palpable membrane, 70% effaced.     Due to these findings, the patient was advised that in general, patients with   cervical change generally will do better with a cerclage versus vaginal   progesterone.     We discussed cerclage in detail, complications are unusual, but can occur and   we reviewed the associated complications.  These included but not limited to   bleeding, infection, rupture of membranes and cervical damage.  The patient   also was informed that without treatment, she has had high probability of   having a premature birth including previable.     PAST MEDICAL HISTORY:  She denies any major medical problems including asthma,   seizures, hypertension, cardiovascular, GI, or  diseases.     PREVIOUS OPERATIONS:  None.     TRANSFUSIONS:  None.     ALLERGIES:  None.     VENEREAL DISEASE HISTORY:  Gonorrhea and chlamydia at age 15.  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  She denies any other STDs.     HABITS:  She quit smoking in 10/2022.  Denies vaping, alcohol consumption   during the pregnancy, marijuana use, CBD products or other street drugs.     MEDICATIONS:  Aspirin 81 mg per day.     PAST OBSTETRICAL HISTORY:  In , she had a TAB at 8-10 weeks.    ROS: NEGATIVE      PHYSICAL EXAMINATION:  GENERAL:  Well-developed, well-nourished, obese female, alert and oriented x3.  VITAL SIGNS:  Blood pressure 122/68, weight 294.1  pounds.  HEAD:  Normocephalic.  EYES:  No scleral icterus or subconjunctival pallor.  Pupils equal, react to   light and accommodation.  LUNGS:  Clear.  HEART:  Regular rate and rhythm, normal S1 and S2.  No S3, S4 or murmurs;   however, heart sounds are distant.  ABDOMEN:  Soft, gravid uterus.  EXTREMITIES:  No edema or varicosities.  NEUROLOGIC:  Cranial nerves II-XII grossly intact.     ASSESSMENT:  1.  Intrauterine pregnancy at 20 weeks and 1 day.  2.  Cervical funneling with cervical dilatation, short cervix.  3.  Ultrasound indicated cerclage.  4.  Probable cervical incompetence.     PLAN:  Proceed with a cervical cerclage.  The patient has been counseled and   indications and alternative approaches.  After reviewing this information, she   has elected to have cerclage.    Comprehensive evaluation with complex decision making.        ______________________________  MD SHARIFA VENTURA/ALEX    DD:  03/27/2023 15:25  DT:  03/27/2023 17:33    Job#:  [id number]    CC:KATHLEEN ROSARIO MD

## 2023-03-28 NOTE — ANESTHESIA POSTPROCEDURE EVALUATION
Patient: Lisa Whaley    Procedure Summary     Date: 03/27/23 Room / Location: [facility] OR 03 / [facility]    Anesthesia Start: 1838 Anesthesia Stop: 1942    Procedure: CERCLAGE, CERVIX (Vagina ) Diagnosis: (Incompetent cervix)    Surgeons: Celestine Wang M.D. Responsible Provider: Lloyd Connors M.D.    Anesthesia Type: spinal ASA Status: 2          Final Anesthesia Type: spinal  Last vitals  BP   Blood Pressure: 131/62    Temp   36.2 °C (97.2 °F)    Pulse   85   Resp   16    SpO2          Anesthesia Post Evaluation    Patient location during evaluation: PACU  Patient participation: complete - patient participated  Level of consciousness: awake and alert  Pain score: 0    Airway patency: patent  Anesthetic complications: no  Cardiovascular status: hemodynamically stable  Respiratory status: acceptable  Hydration status: euvolemic    PONV: none          No notable events documented.

## 2023-03-28 NOTE — PROGRESS NOTES
Pt here for cerclage, pt into 234 for prep.     Call made to Dr Wang, orders received. Pt prepped for cerclage.     1745: Report to Norma STONE. POC discussed.

## 2023-03-28 NOTE — ANESTHESIA PREPROCEDURE EVALUATION
Case: [id number] Date/Time: 03/27/23 1745    Procedure: CERCLAGE, CERVIX (Vagina )    Pre-op diagnosis: Incompetent cervix    Location: [facility] OR 03 / [facility] DEL[facility]    Surgeons: Celestine Wang M.D.          Relevant Problems   No relevant active problems       Physical Exam    Airway   Mallampati: II  TM distance: >3 FB  Neck ROM: full       Cardiovascular - normal exam  Rhythm: regular  Rate: normal  (-) murmur     Dental - normal exam           Pulmonary - normal exam  Breath sounds clear to auscultation     Abdominal    Neurological - normal exam                 Anesthesia Plan    ASA 2       Plan - spinal   Neuraxial block will be primary anesthetic      (Poor iv access.;  )      Induction: intravenous    Postoperative Plan: Postoperative administration of opioids is intended.    Pertinent diagnostic labs and testing reviewed    Informed Consent:    Anesthetic plan and risks discussed with patient.    Use of blood products discussed with: patient whom consented to blood products.

## 2023-05-15 ENCOUNTER — HOSPITAL ENCOUNTER (OUTPATIENT)
Dept: LAB | Facility: MEDICAL CENTER | Age: 33
End: 2023-05-15
Attending: OBSTETRICS & GYNECOLOGY
Payer: COMMERCIAL

## 2023-05-15 LAB
BASOPHILS # BLD AUTO: 0.5 % (ref 0–1.8)
BASOPHILS # BLD: 0.1 K/UL (ref 0–0.12)
EOSINOPHIL # BLD AUTO: 0.26 K/UL (ref 0–0.51)
EOSINOPHIL NFR BLD: 1.3 % (ref 0–6.9)
ERYTHROCYTE [DISTWIDTH] IN BLOOD BY AUTOMATED COUNT: 47.8 FL (ref 35.9–50)
GLUCOSE 1H P 50 G GLC PO SERPL-MCNC: 148 MG/DL (ref 70–139)
HCT VFR BLD AUTO: 39.5 % (ref 37–47)
HGB BLD-MCNC: 13 G/DL (ref 12–16)
IMM GRANULOCYTES # BLD AUTO: 0.4 K/UL (ref 0–0.11)
IMM GRANULOCYTES NFR BLD AUTO: 2 % (ref 0–0.9)
LYMPHOCYTES # BLD AUTO: 2.64 K/UL (ref 1–4.8)
LYMPHOCYTES NFR BLD: 13.4 % (ref 22–41)
MCH RBC QN AUTO: 30.6 PG (ref 27–33)
MCHC RBC AUTO-ENTMCNC: 32.9 G/DL (ref 33.6–35)
MCV RBC AUTO: 92.9 FL (ref 81.4–97.8)
MONOCYTES # BLD AUTO: 0.87 K/UL (ref 0–0.85)
MONOCYTES NFR BLD AUTO: 4.4 % (ref 0–13.4)
NEUTROPHILS # BLD AUTO: 15.46 K/UL (ref 2–7.15)
NEUTROPHILS NFR BLD: 78.4 % (ref 44–72)
NRBC # BLD AUTO: 0 K/UL
NRBC BLD-RTO: 0 /100 WBC
PLATELET # BLD AUTO: 333 K/UL (ref 164–446)
PMV BLD AUTO: 9.8 FL (ref 9–12.9)
RBC # BLD AUTO: 4.25 M/UL (ref 4.2–5.4)
T PALLIDUM AB SER QL IA: NORMAL
WBC # BLD AUTO: 19.7 K/UL (ref 4.8–10.8)

## 2023-05-15 PROCEDURE — 36415 COLL VENOUS BLD VENIPUNCTURE: CPT

## 2023-05-15 PROCEDURE — 85025 COMPLETE CBC W/AUTO DIFF WBC: CPT

## 2023-05-15 PROCEDURE — 82950 GLUCOSE TEST: CPT

## 2023-05-15 PROCEDURE — 86780 TREPONEMA PALLIDUM: CPT

## 2023-05-18 ENCOUNTER — HOSPITAL ENCOUNTER (OUTPATIENT)
Dept: LAB | Facility: MEDICAL CENTER | Age: 33
End: 2023-05-18
Attending: OBSTETRICS & GYNECOLOGY
Payer: COMMERCIAL

## 2023-05-18 PROCEDURE — 36415 COLL VENOUS BLD VENIPUNCTURE: CPT

## 2023-05-18 PROCEDURE — 82952 GTT-ADDED SAMPLES: CPT

## 2023-05-18 PROCEDURE — 82951 GLUCOSE TOLERANCE TEST (GTT): CPT

## 2023-05-19 LAB
GLUCOSE 1H P CHAL SERPL-MCNC: 153 MG/DL (ref 65–180)
GLUCOSE 2H P CHAL SERPL-MCNC: 153 MG/DL (ref 65–155)
GLUCOSE 3H P CHAL SERPL-MCNC: 87 MG/DL (ref 65–140)
GLUCOSE BS SERPL-MCNC: 79 MG/DL (ref 65–95)

## 2023-06-12 ENCOUNTER — HOSPITAL ENCOUNTER (OUTPATIENT)
Facility: MEDICAL CENTER | Age: 33
End: 2023-06-12
Attending: OBSTETRICS & GYNECOLOGY
Payer: COMMERCIAL

## 2023-06-12 PROCEDURE — 87086 URINE CULTURE/COLONY COUNT: CPT

## 2023-06-13 LAB
AMBIGUOUS DTTM AMBI4: NORMAL
SIGNIFICANT IND 70042: NORMAL
SITE SITE: NORMAL
SOURCE SOURCE: NORMAL

## 2023-06-15 LAB
BACTERIA UR CULT: NORMAL
SIGNIFICANT IND 70042: NORMAL
SITE SITE: NORMAL
SOURCE SOURCE: NORMAL

## 2023-06-26 ENCOUNTER — APPOINTMENT (OUTPATIENT)
Dept: PEDIATRICS | Facility: PHYSICIAN GROUP | Age: 33
End: 2023-06-26
Payer: COMMERCIAL

## 2023-06-26 ENCOUNTER — OFFICE VISIT (OUTPATIENT)
Dept: OBGYN | Facility: CLINIC | Age: 33
End: 2023-06-26
Payer: COMMERCIAL

## 2023-06-26 VITALS — SYSTOLIC BLOOD PRESSURE: 115 MMHG | BODY MASS INDEX: 49.15 KG/M2 | DIASTOLIC BLOOD PRESSURE: 75 MMHG | WEIGHT: 293 LBS

## 2023-06-26 DIAGNOSIS — N88.3 CERVICAL INCOMPETENCE: ICD-10-CM

## 2023-06-26 DIAGNOSIS — O99.213 OBESITY AFFECTING PREGNANCY IN THIRD TRIMESTER: ICD-10-CM

## 2023-06-26 PROCEDURE — 3078F DIAST BP <80 MM HG: CPT | Performed by: OBSTETRICS & GYNECOLOGY

## 2023-06-26 PROCEDURE — 3074F SYST BP LT 130 MM HG: CPT | Performed by: OBSTETRICS & GYNECOLOGY

## 2023-06-26 PROCEDURE — 76816 OB US FOLLOW-UP PER FETUS: CPT | Performed by: OBSTETRICS & GYNECOLOGY

## 2023-06-26 ASSESSMENT — FIBROSIS 4 INDEX: FIB4 SCORE: 0.37

## 2023-07-14 ENCOUNTER — HOSPITAL ENCOUNTER (OUTPATIENT)
Facility: MEDICAL CENTER | Age: 33
End: 2023-07-14
Attending: OBSTETRICS & GYNECOLOGY
Payer: COMMERCIAL

## 2023-07-14 PROCEDURE — 87150 DNA/RNA AMPLIFIED PROBE: CPT

## 2023-07-14 PROCEDURE — 87081 CULTURE SCREEN ONLY: CPT

## 2023-07-15 LAB — GP B STREP DNA SPEC QL NAA+PROBE: NEGATIVE

## 2023-08-15 ENCOUNTER — APPOINTMENT (OUTPATIENT)
Dept: OBGYN | Facility: MEDICAL CENTER | Age: 33
End: 2023-08-15
Attending: OBSTETRICS & GYNECOLOGY
Payer: COMMERCIAL

## 2023-08-15 ENCOUNTER — HOSPITAL ENCOUNTER (INPATIENT)
Facility: MEDICAL CENTER | Age: 33
LOS: 3 days | End: 2023-08-18
Attending: OBSTETRICS & GYNECOLOGY | Admitting: OBSTETRICS & GYNECOLOGY
Payer: COMMERCIAL

## 2023-08-15 DIAGNOSIS — Z91.89 AT RISK FOR BREASTFEEDING DIFFICULTY: ICD-10-CM

## 2023-08-15 LAB — HOLDING TUBE BB 8507: NORMAL

## 2023-08-15 PROCEDURE — 80053 COMPREHEN METABOLIC PANEL: CPT

## 2023-08-15 PROCEDURE — 36415 COLL VENOUS BLD VENIPUNCTURE: CPT

## 2023-08-15 PROCEDURE — 86780 TREPONEMA PALLIDUM: CPT

## 2023-08-15 PROCEDURE — 85025 COMPLETE CBC W/AUTO DIFF WBC: CPT

## 2023-08-15 PROCEDURE — 770002 HCHG ROOM/CARE - OB PRIVATE (112)

## 2023-08-15 RX ORDER — TERBUTALINE SULFATE 1 MG/ML
0.25 INJECTION, SOLUTION SUBCUTANEOUS
Status: DISCONTINUED | OUTPATIENT
Start: 2023-08-15 | End: 2023-08-16 | Stop reason: HOSPADM

## 2023-08-15 RX ORDER — IBUPROFEN 800 MG/1
800 TABLET ORAL
Status: COMPLETED | OUTPATIENT
Start: 2023-08-15 | End: 2023-08-16

## 2023-08-15 RX ORDER — ONDANSETRON 2 MG/ML
4 INJECTION INTRAMUSCULAR; INTRAVENOUS EVERY 6 HOURS PRN
Status: DISCONTINUED | OUTPATIENT
Start: 2023-08-15 | End: 2023-08-16 | Stop reason: HOSPADM

## 2023-08-15 RX ORDER — SODIUM CHLORIDE, SODIUM LACTATE, POTASSIUM CHLORIDE, CALCIUM CHLORIDE 600; 310; 30; 20 MG/100ML; MG/100ML; MG/100ML; MG/100ML
1000 INJECTION, SOLUTION INTRAVENOUS CONTINUOUS
Status: DISCONTINUED | OUTPATIENT
Start: 2023-08-15 | End: 2023-08-16 | Stop reason: HOSPADM

## 2023-08-15 RX ORDER — HYDROXYZINE 50 MG/1
50 TABLET, FILM COATED ORAL EVERY 6 HOURS PRN
Status: DISCONTINUED | OUTPATIENT
Start: 2023-08-15 | End: 2023-08-16 | Stop reason: HOSPADM

## 2023-08-15 RX ORDER — LIDOCAINE HYDROCHLORIDE 10 MG/ML
20 INJECTION, SOLUTION INFILTRATION; PERINEURAL
Status: DISCONTINUED | OUTPATIENT
Start: 2023-08-15 | End: 2023-08-16 | Stop reason: HOSPADM

## 2023-08-15 RX ORDER — ACETAMINOPHEN 500 MG
1000 TABLET ORAL
Status: COMPLETED | OUTPATIENT
Start: 2023-08-15 | End: 2023-08-16

## 2023-08-15 RX ORDER — CARBOPROST TROMETHAMINE 250 UG/ML
250 INJECTION, SOLUTION INTRAMUSCULAR
Status: DISCONTINUED | OUTPATIENT
Start: 2023-08-15 | End: 2023-08-16 | Stop reason: HOSPADM

## 2023-08-15 RX ORDER — MISOPROSTOL 200 UG/1
800 TABLET ORAL
Status: COMPLETED | OUTPATIENT
Start: 2023-08-15 | End: 2023-08-16

## 2023-08-15 RX ORDER — ONDANSETRON 4 MG/1
4 TABLET, ORALLY DISINTEGRATING ORAL EVERY 6 HOURS PRN
Status: DISCONTINUED | OUTPATIENT
Start: 2023-08-15 | End: 2023-08-16 | Stop reason: HOSPADM

## 2023-08-15 RX ORDER — DEXTROSE, SODIUM CHLORIDE, SODIUM LACTATE, POTASSIUM CHLORIDE, AND CALCIUM CHLORIDE 5; .6; .31; .03; .02 G/100ML; G/100ML; G/100ML; G/100ML; G/100ML
INJECTION, SOLUTION INTRAVENOUS CONTINUOUS
Status: DISCONTINUED | OUTPATIENT
Start: 2023-08-16 | End: 2023-08-17 | Stop reason: HOSPADM

## 2023-08-15 RX ORDER — OXYTOCIN 10 [USP'U]/ML
10 INJECTION, SOLUTION INTRAMUSCULAR; INTRAVENOUS
Status: DISCONTINUED | OUTPATIENT
Start: 2023-08-15 | End: 2023-08-16 | Stop reason: HOSPADM

## 2023-08-15 RX ORDER — METHYLERGONOVINE MALEATE 0.2 MG/ML
0.2 INJECTION INTRAVENOUS
Status: DISCONTINUED | OUTPATIENT
Start: 2023-08-15 | End: 2023-08-16 | Stop reason: HOSPADM

## 2023-08-15 ASSESSMENT — PAIN SCALES - GENERAL: PAINLEVEL: 0 - NO PAIN

## 2023-08-15 ASSESSMENT — FIBROSIS 4 INDEX: FIB4 SCORE: 0.37

## 2023-08-15 ASSESSMENT — PATIENT HEALTH QUESTIONNAIRE - PHQ9
1. LITTLE INTEREST OR PLEASURE IN DOING THINGS: NOT AT ALL
SUM OF ALL RESPONSES TO PHQ9 QUESTIONS 1 AND 2: 0
2. FEELING DOWN, DEPRESSED, IRRITABLE, OR HOPELESS: NOT AT ALL

## 2023-08-15 ASSESSMENT — PAIN DESCRIPTION - PAIN TYPE: TYPE: ACUTE PAIN

## 2023-08-15 ASSESSMENT — LIFESTYLE VARIABLES: EVER_SMOKED: YES

## 2023-08-16 ENCOUNTER — ANESTHESIA EVENT (OUTPATIENT)
Dept: ANESTHESIOLOGY | Facility: MEDICAL CENTER | Age: 33
End: 2023-08-16
Payer: COMMERCIAL

## 2023-08-16 ENCOUNTER — ANESTHESIA (OUTPATIENT)
Dept: ANESTHESIOLOGY | Facility: MEDICAL CENTER | Age: 33
End: 2023-08-16
Payer: COMMERCIAL

## 2023-08-16 LAB
ALBUMIN SERPL BCP-MCNC: 3.7 G/DL (ref 3.2–4.9)
ALBUMIN/GLOB SERPL: 1.5 G/DL
ALP SERPL-CCNC: 116 U/L (ref 30–99)
ALT SERPL-CCNC: 9 U/L (ref 2–50)
ANION GAP SERPL CALC-SCNC: 13 MMOL/L (ref 7–16)
AST SERPL-CCNC: 14 U/L (ref 12–45)
BASOPHILS # BLD AUTO: 0.4 % (ref 0–1.8)
BASOPHILS # BLD: 0.08 K/UL (ref 0–0.12)
BILIRUB SERPL-MCNC: <0.2 MG/DL (ref 0.1–1.5)
BUN SERPL-MCNC: 9 MG/DL (ref 8–22)
CALCIUM ALBUM COR SERPL-MCNC: 9.7 MG/DL (ref 8.5–10.5)
CALCIUM SERPL-MCNC: 9.5 MG/DL (ref 8.5–10.5)
CHLORIDE SERPL-SCNC: 105 MMOL/L (ref 96–112)
CO2 SERPL-SCNC: 19 MMOL/L (ref 20–33)
CREAT SERPL-MCNC: 0.5 MG/DL (ref 0.5–1.4)
CREAT UR-MCNC: 100.8 MG/DL
EOSINOPHIL # BLD AUTO: 0.12 K/UL (ref 0–0.51)
EOSINOPHIL NFR BLD: 0.6 % (ref 0–6.9)
ERYTHROCYTE [DISTWIDTH] IN BLOOD BY AUTOMATED COUNT: 41.9 FL (ref 35.9–50)
GFR SERPLBLD CREATININE-BSD FMLA CKD-EPI: 127 ML/MIN/1.73 M 2
GLOBULIN SER CALC-MCNC: 2.5 G/DL (ref 1.9–3.5)
GLUCOSE SERPL-MCNC: 92 MG/DL (ref 65–99)
HCT VFR BLD AUTO: 37.4 % (ref 37–47)
HGB BLD-MCNC: 12.8 G/DL (ref 12–16)
IMM GRANULOCYTES # BLD AUTO: 0.33 K/UL (ref 0–0.11)
IMM GRANULOCYTES NFR BLD AUTO: 1.7 % (ref 0–0.9)
LYMPHOCYTES # BLD AUTO: 3.17 K/UL (ref 1–4.8)
LYMPHOCYTES NFR BLD: 16.5 % (ref 22–41)
MCH RBC QN AUTO: 30.3 PG (ref 27–33)
MCHC RBC AUTO-ENTMCNC: 34.2 G/DL (ref 32.2–35.5)
MCV RBC AUTO: 88.6 FL (ref 81.4–97.8)
MONOCYTES # BLD AUTO: 1.37 K/UL (ref 0–0.85)
MONOCYTES NFR BLD AUTO: 7.1 % (ref 0–13.4)
NEUTROPHILS # BLD AUTO: 14.14 K/UL (ref 1.82–7.42)
NEUTROPHILS NFR BLD: 73.7 % (ref 44–72)
NRBC # BLD AUTO: 0 K/UL
NRBC BLD-RTO: 0 /100 WBC (ref 0–0.2)
PLATELET # BLD AUTO: 309 K/UL (ref 164–446)
PMV BLD AUTO: 10.2 FL (ref 9–12.9)
POTASSIUM SERPL-SCNC: 4 MMOL/L (ref 3.6–5.5)
PROT SERPL-MCNC: 6.2 G/DL (ref 6–8.2)
PROT UR-MCNC: 16 MG/DL (ref 0–15)
PROT/CREAT UR: 159 MG/G (ref 10–107)
RBC # BLD AUTO: 4.22 M/UL (ref 4.2–5.4)
SODIUM SERPL-SCNC: 137 MMOL/L (ref 135–145)
T PALLIDUM AB SER QL IA: NORMAL
WBC # BLD AUTO: 19.2 K/UL (ref 4.8–10.8)

## 2023-08-16 PROCEDURE — 10907ZC DRAINAGE OF AMNIOTIC FLUID, THERAPEUTIC FROM PRODUCTS OF CONCEPTION, VIA NATURAL OR ARTIFICIAL OPENING: ICD-10-PCS | Performed by: OBSTETRICS & GYNECOLOGY

## 2023-08-16 PROCEDURE — 700101 HCHG RX REV CODE 250: Performed by: ANESTHESIOLOGY

## 2023-08-16 PROCEDURE — 59409 OBSTETRICAL CARE: CPT

## 2023-08-16 PROCEDURE — 304965 HCHG RECOVERY SERVICES

## 2023-08-16 PROCEDURE — 82570 ASSAY OF URINE CREATININE: CPT

## 2023-08-16 PROCEDURE — 700102 HCHG RX REV CODE 250 W/ 637 OVERRIDE(OP): Performed by: OBSTETRICS & GYNECOLOGY

## 2023-08-16 PROCEDURE — 303615 HCHG EPIDURAL/SPINAL ANESTHESIA FOR LABOR

## 2023-08-16 PROCEDURE — 3E033VJ INTRODUCTION OF OTHER HORMONE INTO PERIPHERAL VEIN, PERCUTANEOUS APPROACH: ICD-10-PCS | Performed by: OBSTETRICS & GYNECOLOGY

## 2023-08-16 PROCEDURE — 84156 ASSAY OF PROTEIN URINE: CPT

## 2023-08-16 PROCEDURE — A9270 NON-COVERED ITEM OR SERVICE: HCPCS | Performed by: OBSTETRICS & GYNECOLOGY

## 2023-08-16 PROCEDURE — 700105 HCHG RX REV CODE 258: Performed by: OBSTETRICS & GYNECOLOGY

## 2023-08-16 PROCEDURE — 700111 HCHG RX REV CODE 636 W/ 250 OVERRIDE (IP): Mod: JZ

## 2023-08-16 PROCEDURE — 700111 HCHG RX REV CODE 636 W/ 250 OVERRIDE (IP): Performed by: ANESTHESIOLOGY

## 2023-08-16 PROCEDURE — 770002 HCHG ROOM/CARE - OB PRIVATE (112)

## 2023-08-16 PROCEDURE — 700111 HCHG RX REV CODE 636 W/ 250 OVERRIDE (IP): Mod: JZ | Performed by: OBSTETRICS & GYNECOLOGY

## 2023-08-16 PROCEDURE — 700111 HCHG RX REV CODE 636 W/ 250 OVERRIDE (IP): Mod: JZ | Performed by: ANESTHESIOLOGY

## 2023-08-16 RX ORDER — BUPIVACAINE HYDROCHLORIDE 2.5 MG/ML
INJECTION, SOLUTION EPIDURAL; INFILTRATION; INTRACAUDAL
Status: DISCONTINUED
Start: 2023-08-16 | End: 2023-08-17 | Stop reason: HOSPADM

## 2023-08-16 RX ORDER — AZITHROMYCIN 500 MG/5ML
500 INJECTION, POWDER, LYOPHILIZED, FOR SOLUTION INTRAVENOUS ONCE
Status: DISCONTINUED | OUTPATIENT
Start: 2023-08-16 | End: 2023-08-17 | Stop reason: HOSPADM

## 2023-08-16 RX ORDER — ROPIVACAINE HYDROCHLORIDE 2 MG/ML
INJECTION, SOLUTION EPIDURAL; INFILTRATION
Status: COMPLETED | OUTPATIENT
Start: 2023-08-16 | End: 2023-08-16

## 2023-08-16 RX ORDER — SODIUM CHLORIDE, SODIUM GLUCONATE, SODIUM ACETATE, POTASSIUM CHLORIDE AND MAGNESIUM CHLORIDE 526; 502; 368; 37; 30 MG/100ML; MG/100ML; MG/100ML; MG/100ML; MG/100ML
1000 INJECTION, SOLUTION INTRAVENOUS ONCE
Status: CANCELLED | OUTPATIENT
Start: 2023-08-16 | End: 2023-08-16

## 2023-08-16 RX ORDER — SODIUM CHLORIDE, SODIUM LACTATE, POTASSIUM CHLORIDE, AND CALCIUM CHLORIDE .6; .31; .03; .02 G/100ML; G/100ML; G/100ML; G/100ML
1000 INJECTION, SOLUTION INTRAVENOUS
Status: DISCONTINUED | OUTPATIENT
Start: 2023-08-16 | End: 2023-08-16 | Stop reason: HOSPADM

## 2023-08-16 RX ORDER — MISOPROSTOL 200 UG/1
800 TABLET ORAL
Status: DISCONTINUED | OUTPATIENT
Start: 2023-08-16 | End: 2023-08-18 | Stop reason: HOSPADM

## 2023-08-16 RX ORDER — SODIUM CHLORIDE, SODIUM LACTATE, POTASSIUM CHLORIDE, AND CALCIUM CHLORIDE .6; .31; .03; .02 G/100ML; G/100ML; G/100ML; G/100ML
250 INJECTION, SOLUTION INTRAVENOUS PRN
Status: DISCONTINUED | OUTPATIENT
Start: 2023-08-16 | End: 2023-08-16 | Stop reason: HOSPADM

## 2023-08-16 RX ORDER — METOCLOPRAMIDE HYDROCHLORIDE 5 MG/ML
10 INJECTION INTRAMUSCULAR; INTRAVENOUS ONCE
Status: CANCELLED | OUTPATIENT
Start: 2023-08-16 | End: 2023-08-16

## 2023-08-16 RX ORDER — EPHEDRINE SULFATE 50 MG/ML
5 INJECTION, SOLUTION INTRAVENOUS
Status: DISCONTINUED | OUTPATIENT
Start: 2023-08-16 | End: 2023-08-16 | Stop reason: HOSPADM

## 2023-08-16 RX ORDER — LABETALOL HYDROCHLORIDE 5 MG/ML
20-80 INJECTION, SOLUTION INTRAVENOUS
Status: DISCONTINUED | OUTPATIENT
Start: 2023-08-16 | End: 2023-08-16 | Stop reason: HOSPADM

## 2023-08-16 RX ORDER — DOCUSATE SODIUM 100 MG/1
100 CAPSULE, LIQUID FILLED ORAL 2 TIMES DAILY PRN
Status: DISCONTINUED | OUTPATIENT
Start: 2023-08-16 | End: 2023-08-18 | Stop reason: HOSPADM

## 2023-08-16 RX ORDER — SIMETHICONE 125 MG
125 TABLET,CHEWABLE ORAL 4 TIMES DAILY PRN
Status: DISCONTINUED | OUTPATIENT
Start: 2023-08-16 | End: 2023-08-18 | Stop reason: HOSPADM

## 2023-08-16 RX ORDER — CITRIC ACID/SODIUM CITRATE 334-500MG
30 SOLUTION, ORAL ORAL ONCE
Status: CANCELLED | OUTPATIENT
Start: 2023-08-16 | End: 2023-08-16

## 2023-08-16 RX ORDER — CALCIUM CARBONATE 500 MG/1
1000 TABLET, CHEWABLE ORAL EVERY 6 HOURS PRN
Status: DISCONTINUED | OUTPATIENT
Start: 2023-08-16 | End: 2023-08-18 | Stop reason: HOSPADM

## 2023-08-16 RX ORDER — IBUPROFEN 800 MG/1
800 TABLET ORAL EVERY 8 HOURS PRN
Status: DISCONTINUED | OUTPATIENT
Start: 2023-08-16 | End: 2023-08-18 | Stop reason: HOSPADM

## 2023-08-16 RX ORDER — BISACODYL 10 MG
10 SUPPOSITORY, RECTAL RECTAL PRN
Status: DISCONTINUED | OUTPATIENT
Start: 2023-08-16 | End: 2023-08-18 | Stop reason: HOSPADM

## 2023-08-16 RX ORDER — OXYCODONE HYDROCHLORIDE 5 MG/1
5 TABLET ORAL EVERY 4 HOURS PRN
Status: DISCONTINUED | OUTPATIENT
Start: 2023-08-16 | End: 2023-08-18 | Stop reason: HOSPADM

## 2023-08-16 RX ORDER — ACETAMINOPHEN 500 MG
1000 TABLET ORAL EVERY 6 HOURS PRN
Status: DISCONTINUED | OUTPATIENT
Start: 2023-08-16 | End: 2023-08-18 | Stop reason: HOSPADM

## 2023-08-16 RX ORDER — METHYLERGONOVINE MALEATE 0.2 MG/ML
0.2 INJECTION INTRAVENOUS
Status: DISCONTINUED | OUTPATIENT
Start: 2023-08-16 | End: 2023-08-18 | Stop reason: HOSPADM

## 2023-08-16 RX ORDER — LIDOCAINE HYDROCHLORIDE AND EPINEPHRINE 15; 5 MG/ML; UG/ML
INJECTION, SOLUTION EPIDURAL
Status: COMPLETED | OUTPATIENT
Start: 2023-08-16 | End: 2023-08-16

## 2023-08-16 RX ORDER — VITAMIN A ACETATE, BETA CAROTENE, ASCORBIC ACID, CHOLECALCIFEROL, .ALPHA.-TOCOPHEROL ACETATE, DL-, THIAMINE MONONITRATE, RIBOFLAVIN, NIACINAMIDE, PYRIDOXINE HYDROCHLORIDE, FOLIC ACID, CYANOCOBALAMIN, CALCIUM CARBONATE, FERROUS FUMARATE, ZINC OXIDE, CUPRIC OXIDE 3080; 12; 120; 400; 1; 1.84; 3; 20; 22; 920; 25; 200; 27; 10; 2 [IU]/1; UG/1; MG/1; [IU]/1; MG/1; MG/1; MG/1; MG/1; MG/1; [IU]/1; MG/1; MG/1; MG/1; MG/1; MG/1
1 TABLET, FILM COATED ORAL
Status: DISCONTINUED | OUTPATIENT
Start: 2023-08-17 | End: 2023-08-18 | Stop reason: HOSPADM

## 2023-08-16 RX ORDER — ROPIVACAINE HYDROCHLORIDE 2 MG/ML
INJECTION, SOLUTION EPIDURAL; INFILTRATION; PERINEURAL
Status: COMPLETED
Start: 2023-08-16 | End: 2023-08-16

## 2023-08-16 RX ORDER — ROPIVACAINE HYDROCHLORIDE 2 MG/ML
INJECTION, SOLUTION EPIDURAL; INFILTRATION; PERINEURAL CONTINUOUS
Status: DISCONTINUED | OUTPATIENT
Start: 2023-08-16 | End: 2023-08-17 | Stop reason: HOSPADM

## 2023-08-16 RX ORDER — HYDRALAZINE HYDROCHLORIDE 20 MG/ML
5-10 INJECTION INTRAMUSCULAR; INTRAVENOUS
Status: DISCONTINUED | OUTPATIENT
Start: 2023-08-16 | End: 2023-08-16 | Stop reason: HOSPADM

## 2023-08-16 RX ORDER — SODIUM CHLORIDE, SODIUM LACTATE, POTASSIUM CHLORIDE, CALCIUM CHLORIDE 600; 310; 30; 20 MG/100ML; MG/100ML; MG/100ML; MG/100ML
INJECTION, SOLUTION INTRAVENOUS PRN
Status: DISCONTINUED | OUTPATIENT
Start: 2023-08-16 | End: 2023-08-18 | Stop reason: HOSPADM

## 2023-08-16 RX ORDER — CARBOPROST TROMETHAMINE 250 UG/ML
250 INJECTION, SOLUTION INTRAMUSCULAR
Status: DISCONTINUED | OUTPATIENT
Start: 2023-08-16 | End: 2023-08-18 | Stop reason: HOSPADM

## 2023-08-16 RX ORDER — NIFEDIPINE 10 MG/1
10 CAPSULE ORAL
Status: DISCONTINUED | OUTPATIENT
Start: 2023-08-16 | End: 2023-08-16 | Stop reason: HOSPADM

## 2023-08-16 RX ADMIN — ROPIVACAINE HYDROCHLORIDE: 2 INJECTION, SOLUTION EPIDURAL; INFILTRATION at 12:30

## 2023-08-16 RX ADMIN — ONDANSETRON 4 MG: 2 INJECTION INTRAMUSCULAR; INTRAVENOUS at 16:30

## 2023-08-16 RX ADMIN — OXYTOCIN 1 MILLI-UNITS/MIN: 10 INJECTION, SOLUTION INTRAMUSCULAR; INTRAVENOUS at 00:17

## 2023-08-16 RX ADMIN — MISOPROSTOL 800 MCG: 200 TABLET ORAL at 21:12

## 2023-08-16 RX ADMIN — BUPIVACAINE HYDROCHLORIDE 10 ML: 2.5 INJECTION, SOLUTION EPIDURAL; INFILTRATION; INTRACAUDAL; PERINEURAL at 16:28

## 2023-08-16 RX ADMIN — ROPIVACAINE HYDROCHLORIDE: 2 INJECTION, SOLUTION EPIDURAL; INFILTRATION at 16:04

## 2023-08-16 RX ADMIN — IBUPROFEN 800 MG: 800 TABLET, FILM COATED ORAL at 22:49

## 2023-08-16 RX ADMIN — SODIUM CHLORIDE, POTASSIUM CHLORIDE, SODIUM LACTATE AND CALCIUM CHLORIDE 1000 ML: 600; 310; 30; 20 INJECTION, SOLUTION INTRAVENOUS at 00:14

## 2023-08-16 RX ADMIN — ONDANSETRON 4 MG: 2 INJECTION INTRAMUSCULAR; INTRAVENOUS at 12:29

## 2023-08-16 RX ADMIN — ROPIVACAINE HYDROCHLORIDE 200 MG: 2 INJECTION, SOLUTION EPIDURAL; INFILTRATION at 03:15

## 2023-08-16 RX ADMIN — SODIUM CHLORIDE, SODIUM LACTATE, POTASSIUM CHLORIDE, CALCIUM CHLORIDE AND DEXTROSE MONOHYDRATE: 5; 600; 310; 30; 20 INJECTION, SOLUTION INTRAVENOUS at 09:19

## 2023-08-16 RX ADMIN — ROPIVACAINE HYDROCHLORIDE 10 ML: 5 INJECTION, SOLUTION EPIDURAL; INFILTRATION; PERINEURAL at 02:35

## 2023-08-16 RX ADMIN — ACETAMINOPHEN 1000 MG: 500 TABLET, FILM COATED ORAL at 16:06

## 2023-08-16 RX ADMIN — OXYTOCIN 125 ML/HR: 10 INJECTION, SOLUTION INTRAMUSCULAR; INTRAVENOUS at 23:11

## 2023-08-16 RX ADMIN — SODIUM CHLORIDE, POTASSIUM CHLORIDE, SODIUM LACTATE AND CALCIUM CHLORIDE 1000 ML: 600; 310; 30; 20 INJECTION, SOLUTION INTRAVENOUS at 17:21

## 2023-08-16 RX ADMIN — LIDOCAINE HYDROCHLORIDE,EPINEPHRINE BITARTRATE 3 ML: 15; .005 INJECTION, SOLUTION EPIDURAL; INFILTRATION; INTRACAUDAL; PERINEURAL at 02:35

## 2023-08-16 RX ADMIN — OXYTOCIN 20 UNITS: 10 INJECTION, SOLUTION INTRAMUSCULAR; INTRAVENOUS at 21:13

## 2023-08-16 ASSESSMENT — PATIENT HEALTH QUESTIONNAIRE - PHQ9
1. LITTLE INTEREST OR PLEASURE IN DOING THINGS: NOT AT ALL
2. FEELING DOWN, DEPRESSED, IRRITABLE, OR HOPELESS: NOT AT ALL
SUM OF ALL RESPONSES TO PHQ9 QUESTIONS 1 AND 2: 0

## 2023-08-16 ASSESSMENT — PAIN DESCRIPTION - PAIN TYPE: TYPE: ACUTE PAIN

## 2023-08-16 ASSESSMENT — PAIN SCALES - GENERAL: PAIN_LEVEL: 0

## 2023-08-16 NOTE — PROGRESS NOTES
"1.  Intrauterine pregnancy at 40+2 weeks' gestation.  2.  Morbid obesity with a BMI of 50.  3.  History of cervical incompetence, status post Kelley cerclage placement   and removal.  4.  GBS negative.  5.  Favorable cervix.  6.  History of pre-existing hypertension, well controlled, not requiring   medications.  7.  Aneurysmal foramen ovale seen on ultrasound performed by Dr. Wang and a    echocardiography is recommended.    S:  Doing well. Comfortable with epidural.  Discussed need to continue IV pitocin.      O: /54   Pulse 90   Temp 36.9 °C (98.5 °F) (Temporal)   Resp 16   Ht 1.676 m (5' 6\")   Wt (!) 145 kg (320 lb)   SpO2 96%     A, A, and O x 3 NAD    SVE: 5-6/90%/-1    IUPC: every 2-4 minutes.  MVUs - 130-150 (not yet adequate)    FSE: baseline of 130 bpm and accelerations to 150 bpm and occasional early decelerations.  Category I-II tracing.    IV pitocin at 8 mU/minutes    Recent Labs     08/15/23  2240   WBC 19.2*   RBC 4.22   HEMOGLOBIN 12.8   HEMATOCRIT 37.4   MCV 88.6   MCH 30.3   RDW 41.9   PLATELETCT 309   MPV 10.2   NEUTSPOLYS 73.70*   LYMPHOCYTES 16.50*   MONOCYTES 7.10   EOSINOPHILS 0.60   BASOPHILS 0.40     A/P: IUP at 40w2d.  IOL for morbid obesity and preexisting hypertension with cervical incompetence during this pregnancy and s/p Kelley cerclage placement and removal and who has elevated/labile blood pressures and is GBS negative.    Continue IV pitocin per protocol.  Comfortable with epidural.  Continue cares.  Anticipate .  Available until 1300 and then will sign out to Dr. Carson who will cover me until 1700 and then Dr. Purvis to cover after that time.  Hypertensive protocol ordered.  Normal preeclampsia labs.    "

## 2023-08-16 NOTE — CARE PLAN
The patient is Stable - Low risk of patient condition declining or worsening    Shift Goals  Clinical Goals: cervical dilation  Patient Goals: healthy mom; healthy baby  Family Goals: support    Progress made toward(s) clinical / shift goals:  Progressing    Problem: Risk for Injury  Goal: Patient and fetus will be free of preventable injury/complications  Outcome: Progressing  Note: Continuous uterine contraction and fetal heart rate monitoring in place.      Problem: Pain  Goal: Patient's pain will be alleviated or reduced to the patient’s comfort goal  Outcome: Progressing  Note: Patient educated on pain management techniques in labor. Patient has an epidural at this time; patient knows to call out to this RN with any change in pain.

## 2023-08-16 NOTE — PROGRESS NOTES
"1.  Intrauterine pregnancy at 40+2 weeks' gestation.  2.  Morbid obesity with a BMI of 50.  3.  History of cervical incompetence, status post Kelley cerclage placement   and removal.  4.  GBS negative.  5.  Favorable cervix.  6.  History of pre-existing hypertension, well controlled, not requiring   medications.  7.  Aneurysmal foramen ovale seen on ultrasound performed by Dr. Wang and a    echocardiography is recommended.    S:  Doing well.  Comfortable with epidural in her current position.  Discussed need for cervical examination.  Discussed the fact that I am post call and will be signing out to my call partner.    O:  /58   Pulse (!) 111   Temp 36.9 °C (98.4 °F) (Temporal)   Resp 16   Ht 1.676 m (5' 6\")   Wt (!) 145 kg (320 lb)   SpO2 96%     A, A, and O x 3 NAD    SVE: 7/90%/-1    IUPC: every 3-5 minutes.  MVUS - 180    FSE: baseline of 130 bpm with accelerations to 150 bpm.  Run of deep variable decelerations from 0748-4455 improved with position changes.  Category I-II tracing.    Vertex    EFW - 3400 grams    IV Pitocin at 10 mU/minute    A/P: IUP at 40w2d.  IOL for morbid obesity and preexisting hypertension who is GBS negative and has a history of cervical incompetence with cerclage placement at 20w3d and removal at 37 weeks who is now in active labor.     Continue IV pitocin.  Continue position changes.  Anticipate .  Will sign out to Dr. Carson for coverage.  [place] fetal echo recommended per Dr. Wang.  "

## 2023-08-16 NOTE — PROGRESS NOTES
Labor Progress Note    Lisa Whaley   40w3d      Subjective:  Pt with urge to push. Pt s/p Epidural bolus.  Uterine contractions:yes  Pain: Yes. Severity: moderate    Objective:   Vitals:    08/16/23 1507 08/16/23 1527 08/16/23 1533 08/16/23 1546   BP: (!) 142/84 (!) 153/93  134/73   Pulse: 93 96  (!) 106   Resp:       Temp:   36.3 °C (97.3 °F)    TempSrc:   Temporal    SpO2:       Weight:       Height:         FHT: 140's cat 1  Avery: q 2  SVE: 9/c/0, per nurse      Membranes ruptured: .yes, 4:01 am, clear    Meds:   Epidural : .yes  Pitocin: .yes, 12 mu    Labs:  Recent Results (from the past 24 hour(s))   Comp Metabolic Panel    Collection Time: 08/15/23 10:40 PM   Result Value Ref Range    Sodium 137 135 - 145 mmol/L    Potassium 4.0 3.6 - 5.5 mmol/L    Chloride 105 96 - 112 mmol/L    Co2 19 (L) 20 - 33 mmol/L    Anion Gap 13.0 7.0 - 16.0    Glucose 92 65 - 99 mg/dL    Bun 9 8 - 22 mg/dL    Creatinine 0.50 0.50 - 1.40 mg/dL    Calcium 9.5 8.5 - 10.5 mg/dL    Correct Calcium 9.7 8.5 - 10.5 mg/dL    AST(SGOT) 14 12 - 45 U/L    ALT(SGPT) 9 2 - 50 U/L    Alkaline Phosphatase 116 (H) 30 - 99 U/L    Total Bilirubin <0.2 0.1 - 1.5 mg/dL    Albumin 3.7 3.2 - 4.9 g/dL    Total Protein 6.2 6.0 - 8.2 g/dL    Globulin 2.5 1.9 - 3.5 g/dL    A-G Ratio 1.5 g/dL   Hold Blood Bank Specimen (Not Tested)    Collection Time: 08/15/23 10:40 PM   Result Value Ref Range    Holding Tube - Bb DONE    CBC with differential    Collection Time: 08/15/23 10:40 PM   Result Value Ref Range    WBC 19.2 (H) 4.8 - 10.8 K/uL    RBC 4.22 4.20 - 5.40 M/uL    Hemoglobin 12.8 12.0 - 16.0 g/dL    Hematocrit 37.4 37.0 - 47.0 %    MCV 88.6 81.4 - 97.8 fL    MCH 30.3 27.0 - 33.0 pg    MCHC 34.2 32.2 - 35.5 g/dL    RDW 41.9 35.9 - 50.0 fL    Platelet Count 309 164 - 446 K/uL    MPV 10.2 9.0 - 12.9 fL    Neutrophils-Polys 73.70 (H) 44.00 - 72.00 %    Lymphocytes 16.50 (L) 22.00 - 41.00 %    Monocytes 7.10 0.00 - 13.40 %    Eosinophils 0.60 0.00  - 6.90 %    Basophils 0.40 0.00 - 1.80 %    Immature Granulocytes 1.70 (H) 0.00 - 0.90 %    Nucleated RBC 0.00 0.00 - 0.20 /100 WBC    Neutrophils (Absolute) 14.14 (H) 1.82 - 7.42 K/uL    Lymphs (Absolute) 3.17 1.00 - 4.80 K/uL    Monos (Absolute) 1.37 (H) 0.00 - 0.85 K/uL    Eos (Absolute) 0.12 0.00 - 0.51 K/uL    Baso (Absolute) 0.08 0.00 - 0.12 K/uL    Immature Granulocytes (abs) 0.33 (H) 0.00 - 0.11 K/uL    NRBC (Absolute) 0.00 K/uL   T.PALLIDUM AB NICKI (Syphilis)    Collection Time: 08/15/23 10:40 PM   Result Value Ref Range    Syphilis, Treponemal Qual Non-Reactive Non-Reactive   ESTIMATED GFR    Collection Time: 08/15/23 10:40 PM   Result Value Ref Range    GFR (CKD-EPI) 127 >60 mL/min/1.73 m 2   PROTEIN/CREAT RATIO URINE    Collection Time: 23  1:30 AM   Result Value Ref Range    Total Protein, Urine 16.0 (H) 0.0 - 15.0 mg/dL    Creatinine, Random Urine 100.80 mg/dL    Protein Creatinine Ratio 159 (H) 10 - 107 mg/g       Assessment:   40w3d/obesity-Pt is progressing. CPM. Exp .  Pain control-pt with Epidural in place. Pt s/p Epidural bolus secondary to pain.   Fetal status-reassuring  GBBS negative  Aneurysmal foramen ovale seen on ultrasound performed by Dr. Wang and a    echocardiography is recommended      Simin Carson M.D.

## 2023-08-16 NOTE — PROGRESS NOTES
0700 Report received, plan of care discussed.Pt comfortable with epidural infusing at 10ml/hr.  0800 Assessment done.   0815 Dr. Bryan in room. SVE done. 5/6cm/90/-2  0825 Position change. Pt to Left side.   0845 repetitive variables since positon change, Dr. Bryan reviewed strip.  0900 Postiotn change pt to right side.  1030 pt c/o pain and pressure. SVE 6cm/90/-2  Epidural bolus by patient/ Pt repositioned on peanut ball.   1150 Pt continues to c/o pain after several bolus doses from PCEA. Positon changed to left side.   1210 variables present with each uc. SVE no change, Position change to throne for pt comfort. Variables continue. Pt back to right wedge, HOB up 45 degrees.   1225 Dr. Bryan reviewed strip, updated via text. Orders received for amnioinfusion if variable return/persist. 500 ml bolus. Then 60/hr.   1310 SVE by Dr. Bryan 7 cm/90/-1    1320  Report to STEPHANIE Gonzalez RN.

## 2023-08-16 NOTE — PROGRESS NOTES
"1320 - Report received. POC discussed.   1530 - SVE as noted.   1610 - Pt feeling intense pain. States \"I can't do this anymore\". Attempts made to resolve. See flowsheets. Called anesthesia to bedside to assess. Updated Dr Carson.   1642 - Dr Carson at bedside. Pt comfortable with epidural bolus. States her back and neck are still \"spasming\". Question Dr Carson if applying a TENS unit is acceptable. MD and pt agreeable to POC.   1655 - Pt feeling relief from pain.   1750 - Pt called out stating she needs to push. Went in to assess pt. Pt states she cannot do this. Her back and neck pain is too bad. SVE as noted. Attempted to test push. Pt unable to push due to back pain. Pt states she can't push and doesn't want to do this anymore. States she needs someone to put her under general anesthesia. This RN attempted to contact Dr Fernández and Dr Purvis. Both MDs currently unavailable.  180 - Updated pt on status of MDs. Asked if pt would like me to spot pitocin and prepare for  section. Pt unsure. This RN stated that she would provide time for pt and family to have a discussion.   181 - Dr Purvis at bedside. Discussed risks of . Pt states she would like to try pushing.    - Pushing begun. Pt tolerates well.   1900 - Report given. POC discussed.     "

## 2023-08-16 NOTE — H&P
DATE OF ADMISSION:  08/15/2023     ADMISSION DIAGNOSES:  1.  Intrauterine pregnancy at 40+1 weeks' gestation.  2.  Morbid obesity with a BMI of 50.  3.  History of cervical incompetence, status post Kelley cerclage placement   and removal.  4.  GBS negative.  5.  Favorable cervix.  6.  History of pre-existing hypertension, well controlled, not requiring   medications.  7.  Aneurysmal foramen ovale seen on ultrasound performed by Dr. Wang and a    echocardiography is recommended.     HISTORY OF PRESENT ILLNESS:   The patient is a 32-year-old  2, para   0-0-1-0, at 40+1 weeks' gestation based upon her last menstrual period of   2022 and consistent with a 6+1 week ultrasound performed on 2022,   who presents for induction of labor on 08/15/2023 at approximately 2300 hours.    At the time of admission, her blood pressures were 157/88 and 143/76.    Preeclampsia labs and protein-creatinine ratio are pending.  The patient   reports excellent fetal movement.  She denies vaginal bleeding, vaginal   discharge, regular and painful uterine cramping and contractions.  The patient   was followed by Dr. Wang for pre-existing hypertension and morbid obesity and   that at her anatomy scan, her cervix was noted to be shortened and she   underwent Kelley cerclage placement on 2023 by Dr. Wang.  The patient   underwent cerclage removal at my office on 2023 at 37+1 weeks'   gestation.  At that time, her cervix appeared visually closed and 0% effaced.    She is GBS negative.  She desires epidural for labor analgesia.  She will be   started on IV Pitocin for labor augmentation.  We will monitor her blood   pressures closely and start the antihypertensive protocol with nifedipine,   then labetalol, then hydralazine.     Prenatal care with Dr. Brittney Bryan, first visit on 2022 at 6+4 weeks'   gestation, total visits 13, total weight gain 39 pounds.  Third trimester   blood pressures  118-136/71-83.  Her initial blood pressure at her first visit   was 152/84.     PRENATAL LABS:  GBS negative on 2023.  One-hour , 3-hour GTT   fasting 79, 1-hour 153, 2-hour 153, 3-hour 87.  Blood type O positive.    Antibody screen negative.  Hep C viral antibody nonreactive, rubella immune,   hepatitis C surface antigen nonreactive, HIV nonreactive, RPR nonreactive.  CF   fragile X SMA testing all negative.  Pap smear was significant for high-grade   squamous intraepithelial lesion and HPV positive.  GC chlamydia negative.    The patient will have Paps.  The patient underwent a colposcopy during this   pregnancy and will have a Pap smear postpartum.  NIPT testing is negative for   trisomies 21, 18, and 13, male fetus.  MSAFP for open spina bifida risk is   screened negative.     OBSTETRIC ULTRASOUND:  1.  On 2022 at 6+1 weeks' gestation, DONNELL 2023.  2.  On 2023 at 10+2 weeks' gestation, DONNELL 2023.  3.  On 2023 at 25+6 weeks' gestation, posterior placenta, 3-vessel cord.    This was performed with Dr. Wang.  FANNY 18.5.  851 grams.  1 pound 14 ounces.    25th percentile.  Four-chamber view of the heart demonstrates foramen ovale   aneurysm male fetus.  Plan  echocardiography.  I do not have the   results of her 32- to 34-week ultrasound.  4.  On 2023 at 20+1 weeks' gestation.  Anatomy scan performed by Dr. Wang   demonstrated the cervix was 1.18 cm with funneling to 1.48 cm.  Cervix was 1   cm, 70% effaced and the patient underwent a Kelley cerclage with Dr. Wang on   2023.     PAST OBSTETRICAL HISTORY:  Elective termination x1 in  at 8 weeks'   gestation.     PAST SURGICAL HISTORY:  1.  Surgical  at 13 weeks' gestation.  2.  Kelley cerclage placement by Dr. Wang at 20 weeks' gestation.  3.  She also had a tonsillectomy.     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     SOCIAL HISTORY:  She denies alcohol, tobacco or drugs of abuse.  She is a   former smoker.     ALLERGIES:  No known drug allergies.     MEDICATIONS:  Prenatal vitamins and baby aspirin.     GYNECOLOGIC HISTORY:  She denies HSV or PID.  She does have a past history of   chlamydia and an abnormal Pap smear.     ISSUES THIS PREGNANCY:  1.  Cervical incompetence status post Kelley cerclage placement and removal.  2.  Morbid obesity with a BMI of 50.  3.  HGSIL Pap status post colposcopy and will need a Pap smear postpartum.  4.  Preexisting hypertension, not requiring medications, on a low dose baby   aspirin.     LABORATORY DATA:  On admission for induction, her blood pressures are elevated   and preeclampsia labs are pending.  We have started the hypertensive   protocol.     PHYSICAL EXAMINATION:  VITAL SIGNS:  On admission, blood pressures 159/76, 155/86, 149/83, 96-97% on   room air.  GENERAL:   Alert, awake and oriented x3, in no acute distress.  PELVIC:  Fetal heart rate baseline in the 130s with accels to the 150s,   reactive, without decels.  [place], eber every 4-8 minutes spontaneously.    Sterile vaginal exam on admission, 3 cm, 80% effaced, -2 station.  Admission   labs are pending.  Vertex presentation, estimated fetal weight 3400 grams.    Sterile vaginal exam 3 cm, 80% effaced, -2 station.  The patient underwent   amniotomy with placement of IUPC and FSE at approximately 0400 hours after her   epidural.     ASSESSMENT AND PLAN:   A 32-year-old  2, para 0-0-1-0, admitted at 40+1   weeks' gestation in the late evening of 08/15/2023 who is admitted for   induction of labor secondary to morbid obesity with a BMI of 50; pre-existing   hypertension, well controlled during the pregnancy, but elevated blood   pressures on admission; cervical incompetence, status post Kelley cerclage   placement and removal,  placement at 20 weeks and removal at 37 weeks'   gestation, who is GBS negative with a favorable cervix.     The patient will be admitted to labor and delivery.  She will be started on IV   Pitocin per protocol.  She may have an epidural for labor analgesia.  We will   initiate the antihypertensive protocol and obtain preeclampsia labs with   protein to creatinine ratio.  She will undergo amniotomy with placement of   IUPC and FSE and we will anticipate a normal spontaneous vaginal delivery.        ______________________________  MD KHALIF Amaro/DIOR/ROMMEL    DD:  08/16/2023 04:36  DT:  08/16/2023 06:15    Job#:  [id number]

## 2023-08-16 NOTE — ANESTHESIA PROCEDURE NOTES
Epidural Block    Date/Time: 8/16/2023 2:35 AM    Performed by: Ector Guadalupe M.D.  Authorized by: Ector Guadalupe M.D.    Patient Location:  OB  Start Time:  8/16/2023 2:35 AM  End Time:  8/16/2023 2:54 AM  Reason for Block: labor analgesia    patient identified, IV checked, site marked, risks and benefits discussed, surgical consent, monitors and equipment checked and pre-op evaluation    Patient Position:  Sitting  Prep: ChloraPrep, patient draped and sterile technique    Monitoring:  Blood pressure, continuous pulse oximetry and heart rate  Approach:  Midline  Location:  L4-L5  Injection Technique:  ADRIANA air  Skin infiltration:  Lidocaine  Strength:  1%  Dose:  3ml  Needle Type:  Tuohy  Needle Gauge:  17 G  Needle Length:  3.5 in  Loss of resistance::  9  Catheter Size:  19 G  Catheter at Skin Depth:  12  Test Dose Result:  Negative

## 2023-08-16 NOTE — ANESTHESIA PREPROCEDURE EVALUATION
Date: 08/16/23  Procedure: Labor Epidural         Relevant Problems   No relevant active problems       Physical Exam    Airway   Mallampati: III  TM distance: >3 FB  Neck ROM: full       Cardiovascular - normal exam  Rhythm: regular  Rate: normal  (-) murmur     Dental - normal exam           Pulmonary - normal exam  Breath sounds clear to auscultation     Abdominal   (+) obese     Neurological - normal exam                 Anesthesia Plan    ASA 3   ASA physical status 3 criteria: morbid obesity - BMI greater than or equal to 40    Plan - epidural   Neuraxial block will be labor analgesia                  Pertinent diagnostic labs and testing reviewed    Informed Consent:    Anesthetic plan and risks discussed with patient.

## 2023-08-16 NOTE — PROGRESS NOTES
2224: 31 y/o  EDC , EGA 40.2, here to L&D for an induction of labor for gestational hypertension. EFM/TOCO applied, pt states positive FM. Denies vaginal LOF or bleeding. Randi ANTHONY notified of patient arrival; new orders received.     0215: Patient complaining of pain at this time and requesting an epidural. Collins ANTHONY notified of patient desire for an epidural.    0230: Collins ANTHONY to bedside for epidural placement; time out completed, all agree.     0401: Randi ANTHONY to bedside. SVE 3/80/ . AROM to clear. IUPC and FSE placement by provider.     0700: Report given to Colleen STONE; plan of care discussed, care relinquished at this time.

## 2023-08-17 LAB
ERYTHROCYTE [DISTWIDTH] IN BLOOD BY AUTOMATED COUNT: 43.8 FL (ref 35.9–50)
HCT VFR BLD AUTO: 31.5 % (ref 37–47)
HGB BLD-MCNC: 10.5 G/DL (ref 12–16)
MCH RBC QN AUTO: 30.4 PG (ref 27–33)
MCHC RBC AUTO-ENTMCNC: 33.3 G/DL (ref 32.2–35.5)
MCV RBC AUTO: 91.3 FL (ref 81.4–97.8)
PLATELET # BLD AUTO: 245 K/UL (ref 164–446)
PMV BLD AUTO: 10 FL (ref 9–12.9)
RBC # BLD AUTO: 3.45 M/UL (ref 4.2–5.4)
WBC # BLD AUTO: 31.2 K/UL (ref 4.8–10.8)

## 2023-08-17 PROCEDURE — 770002 HCHG ROOM/CARE - OB PRIVATE (112)

## 2023-08-17 PROCEDURE — A9270 NON-COVERED ITEM OR SERVICE: HCPCS | Performed by: OBSTETRICS & GYNECOLOGY

## 2023-08-17 PROCEDURE — 36415 COLL VENOUS BLD VENIPUNCTURE: CPT

## 2023-08-17 PROCEDURE — 85027 COMPLETE CBC AUTOMATED: CPT

## 2023-08-17 PROCEDURE — 700102 HCHG RX REV CODE 250 W/ 637 OVERRIDE(OP): Performed by: OBSTETRICS & GYNECOLOGY

## 2023-08-17 RX ADMIN — ANTACID TABLETS 1000 MG: 500 TABLET, CHEWABLE ORAL at 17:53

## 2023-08-17 RX ADMIN — IBUPROFEN 800 MG: 800 TABLET, FILM COATED ORAL at 11:06

## 2023-08-17 RX ADMIN — PRENATAL WITH FERROUS FUM AND FOLIC ACID 1 TABLET: 3080; 920; 120; 400; 22; 1.84; 3; 20; 10; 1; 12; 200; 27; 25; 2 TABLET ORAL at 07:52

## 2023-08-17 RX ADMIN — IBUPROFEN 800 MG: 800 TABLET, FILM COATED ORAL at 22:40

## 2023-08-17 ASSESSMENT — EDINBURGH POSTNATAL DEPRESSION SCALE (EPDS)
I HAVE BEEN ANXIOUS OR WORRIED FOR NO GOOD REASON: HARDLY EVER
I HAVE BEEN SO UNHAPPY THAT I HAVE BEEN CRYING: NO, NEVER
I HAVE BLAMED MYSELF UNNECESSARILY WHEN THINGS WENT WRONG: NOT VERY OFTEN
I HAVE LOOKED FORWARD WITH ENJOYMENT TO THINGS: AS MUCH AS I EVER DID
I HAVE FELT SAD OR MISERABLE: NO, NOT AT ALL
I HAVE BEEN ABLE TO LAUGH AND SEE THE FUNNY SIDE OF THINGS: AS MUCH AS I ALWAYS COULD
THINGS HAVE BEEN GETTING ON TOP OF ME: NO, MOST OF THE TIME I HAVE COPED QUITE WELL
THE THOUGHT OF HARMING MYSELF HAS OCCURRED TO ME: NEVER
I HAVE FELT SCARED OR PANICKY FOR NO GOOD REASON: NO, NOT AT ALL
I HAVE BEEN SO UNHAPPY THAT I HAVE HAD DIFFICULTY SLEEPING: NOT AT ALL

## 2023-08-17 ASSESSMENT — PAIN DESCRIPTION - PAIN TYPE
TYPE: ACUTE PAIN

## 2023-08-17 NOTE — PROGRESS NOTES
Pt and FOB Rhea arrived to floor in wheelchair with infant swaddled in arms with L&D RN Brenda. Fundus assessed with L&D RN, firm and midline at umbilicus. Pt and FOB ID bands checked against infant's ankle and wrist band with L&D RN. Oriented pt and FOB to room and call light. Discussed POC with pt and FOB, including infant feeding frequency, latch and infant feeding positioning, I&O clipboard, how to keep baby warm with swaddling and hat, hand expression of breast, how to place baby in bed on back when sleeping or resting, med schedule, jayro care, voiding, and safe ambulation. All questions answered.

## 2023-08-17 NOTE — DISCHARGE SUMMARY
DATE OF ADMISSION:  08/15/2023   DATE OF DISCHARGE:  2023     ADMITTING DIAGNOSES:  1.  Intrauterine pregnancy at 40 and 1/7 weeks.  2.  Morbid obesity with a BMI of 50.  3.  History of cervical incompetence, status post Kelley cerclage placement   and removal.  4.  Group B Strep negative.  5.  Favorable cervix.  6.  History of preexisting hypertension, well controlled, not requiring   medication.  7.  Aneurysmal foramen ovale seen on ultrasound performed by Dr. Wang and    echo is recommended.     Please see previously dictated history and physical.     HOSPITAL COURSE:  The patient was admitted to labor and delivery with the   above diagnoses.  She subsequently underwent a normal spontaneous vaginal   delivery on 2023 of a viable male infant, weight 8 pounds 4.8 ounces,   Apgars 8 and 9.  She had a very small perineal separation, which was   reapproximated using one figure-of-eight 2-0 Vicryl.  She had an estimated   blood loss for the delivery of 200 mL. On postpartum day 1, the patient was   breastfeeding, had minimal lochia, no problems voiding and was ready to be   discharged home if baby was released.  She was going to use over-the-counter   ibuprofen for pain.     PHYSICAL EXAMINATION ON DISCHARGE:  VITAL SIGNS:  Temperature is 97.3, pulse 102, blood pressure 119/68.  ABDOMEN:  Obese, soft, nontender.  Fundus is firm below the umbilicus.  EXTREMITIES:  Show +1 pedal edema.  She has no cords or Homans sign.     LABORATORY DATA:  Her postpartum H and H was pending.  It was 12 and 37 on   admission. Blood type is O positive. Group B Strep status is negative.     DISCHARGE DIAGNOSES:  1.  Intrauterine pregnancy at 40 and 2/7 weeks.  2.  Status post normal spontaneous vaginal delivery.  3.  Induction of labor for preexisting hypertension, not requiring   medications.  The patient did not require any medications during her   hospitalization for blood pressure derangements.     DISCHARGE  INSTRUCTIONS:  1.  The patient will be discharged home with instructions to follow up with   Dr. Bryan in 2 weeks for blood pressure check.  2.  She is instructed on pelvic rest for 6 weeks.  3.  She will take over-the-counter ibuprofen and prenatal vitamins.  4.  We did review blood pressure parameters to call for systolic blood   pressures greater than 160 or diastolics greater than 106.        ______________________________  MD MADDY VEGA/PORSHA/ADOLFO    DD:  08/17/2023 06:44  DT:  08/17/2023 07:16    Job#:  [id number]    CC:KATHLEEN ROSARIO MD

## 2023-08-17 NOTE — CARE PLAN
The patient is Stable - Low risk of patient condition declining or worsening    Shift Goals  Clinical Goals: Pain Control; Monitor VS and Lochia; Education  Patient Goals: Healthy Baby and Healthy Mom  Family Goals: Support    Progress made toward(s) clinical / shift goals:      Problem: Pain  Goal: Patient's pain will be alleviated or reduced to the patient’s comfort goal  Outcome: Progressing  Note: Pt's pain assessed throughout shift. Pt states pain is minimal 1/10 at this time and declines pharmacological treatment at this time.     Problem: Knowledge Deficit - Postpartum  Goal: Patient will verbalize and demonstrate understanding of self and infant care  Outcome: Progressing  Note: Pt verbalizes understanding of POC at this time. No questions or concerns.        Patient is not progressing towards the following goals:

## 2023-08-17 NOTE — ANESTHESIA TIME REPORT
Anesthesia Start and Stop Event Times     Date Time Event    8/16/2023 0229 Ready for Procedure     0229 Anesthesia Start     2047 Anesthesia Stop        Responsible Staff  08/16/23    Name Role Begin End    Ector Guadalupe M.D. Anesth 0229 0655    Serafin Fernández M.D. Anesth 0655 2047        Overtime Reason:  no overtime (within assigned shift)    Comments:

## 2023-08-17 NOTE — CARE PLAN
The patient is Stable - Low risk of patient condition declining or worsening    Shift Goals  Clinical Goals: vitals wnl    Patient Goals: healthy baby and mom  Family Goals: emotional support    Progress made toward(s) clinical / shift goals:    Problem: Psychosocial - L&D  Goal: Patient's level of anxiety will decrease  Outcome: Progressing  Goal: Patient will be able to discuss coping skills during hospitalization  Outcome: Progressing  Goal: Patient's ability to re-evaluate and adapt role responsibilities will improve  Outcome: Progressing  Goal: Spiritual and cultural needs incorporated into hospitalization  Outcome: Progressing       Patient is not progressing towards the following goals:

## 2023-08-17 NOTE — CARE PLAN
Problem: Pain - Standard  Goal: Alleviation of pain or a reduction in pain to the patient’s comfort goal  Outcome: Progressing     Problem: Knowledge Deficit - Postpartum  Goal: Patient will verbalize and demonstrate understanding of self and infant care  Outcome: Progressing     Problem: Altered Physiologic Condition  Goal: Patient physiologically stable as evidenced by normal lochia, palpable uterine involution and vitals within normal limits  Outcome: Progressing     Problem: Infection - Postpartum  Goal: Postpartum patient will be free of signs and symptoms of infection  Outcome: Progressing     The patient is Stable - Low risk of patient condition declining or worsening    Shift Goals  Clinical Goals: lochia WDL, urinate, VS WDL  Patient Goals: healthy baby and mom  Family Goals: support    Progress made toward(s) clinical / shift goals:    Pt's pain is well controlled with PRN and scheduled pain meds. Pt educated on 0-10 rating scale and uses it to rate and reassess her pain this shift. Pt fundus assessed and is firm, bleeding: light and rubra. No large blood clots expressed. Pt is ambulating independently and without difficulty. Passing flatus. Ambulation and fluids encouraged to help promote BM. Pt is independent in self-care and jayro care with pads and tucks supplied in bathroom. Pt using I&Os clipboard to document all infant feedings and diaper changes. Pt educated on call light and uses it appropriately this shift.      Patient is not progressing towards the following goals:

## 2023-08-17 NOTE — PROGRESS NOTES
PP day #1    S:  PT doing well.  Minimal lochia.  Breast feeding.  No problems voiding.   Wants to go home if baby released.  OTC Motrin for pain    O: T 97.3 P 102  /68  ABD: Soft, NT, FF below umb  EXT +1 pedal edema, no cords or Homans  H/H pending - was  on admission  O+  GBS neg    A/P:  PP day #1 S/P  at term, IOL for pre-existing hypertension - doing well   D/C home  F/U Dr. Bryan 2 weeks and 6 weeks  OTC Motrin and PNV  Pelvic rest for 6 weeks  BP parameters reviewed

## 2023-08-17 NOTE — ANESTHESIA POSTPROCEDURE EVALUATION
Patient: Lisa Whaley    Procedure Summary     Date: 08/16/23 Room / Location:     Anesthesia Start: 0229 Anesthesia Stop: 2047    Procedure: Labor Epidural Diagnosis:     Scheduled Providers:  Responsible Provider: Serafin Fernández M.D.    Anesthesia Type: epidural ASA Status: 3          Final Anesthesia Type: epidural  Last vitals  BP   Blood Pressure: 120/69    Temp   36.3 °C (97.3 °F)    Pulse   96   Resp   16    SpO2   96 %      Anesthesia Post Evaluation    Patient location during evaluation: floor  Patient participation: complete - patient participated  Level of consciousness: awake and alert  Pain score: 0    Airway patency: patent  Anesthetic complications: no  Cardiovascular status: hemodynamically stable  Respiratory status: acceptable  Hydration status: euvolemic    PONV: none          No notable events documented.

## 2023-08-17 NOTE — L&D DELIVERY NOTE
DATE OF SERVICE:  2023     This is a 32-year-old  2, para 0, who was admitted to labor and   delivery yesterday at 40 and 1/7th weeks for induction of labor secondary to   pre-existing hypertension controlled without medication. Upon admission to labor and delivery, she did have some   elevated pressures and hypertensive protocol was instituted although she never required and antihypertensive medications throughout labor. .  Her laboratory   studies were normal and she was started on Pitocin.  She received an epidural   for pain management.  She underwent artificial rupture of membranes for clear   fluid.  She had an intrauterine pressure catheter placed and a fetal scalp   electrode applied. Throughout labor, fetal heart tones were in the 130s and   category 1 tracing.  She progressed to complete and on 2023 at 40 and   2/7th weeks she pushed for approximately 2 hours and 45 minutes to deliver the   head over an intact perineum.  Mouth and nose were bulb suctioned.  There was   no evidence of a nuchal cord.  The rest of baby delivered easily with the   next push.  This is a viable male infant, weight 8 pounds 4.8 ounces Apgars 8 and 9,   delivered at  hours from the OA position.  The infant was placed on the mother's abdomen crying   vigorously.  There was delayed cord clamping for 1 minute.  The cord was then   clamped twice and cut.  The placenta delivered spontaneously intact with a   3-vessel cord at 0 hours . 20 units of IV Pitocin, fundal massage, bimanual   massage and 800 mcg of Cytotec per rectum were placed to achieve good uterine   contraction.  Estimated blood loss was 200 mL. Inspection of the cervix   revealed no lacerations.  Inspection of the perineum revealed no lacerations.    There was a very small perineal separation just inside the introitus that I   placed a figure-of-eight suture using 2-0 Vicryl and it was hemostatic.    Mother and infant are stable. Again, she did  not require any blood pressure   medications during labor. She will be monitored closely for postpartum blood   pressure elevations.        ______________________________  MD MADDY VEGA/JESSEE/ADOLFO    DD:  08/16/2023 21:44  DT:  08/16/2023 21:59    Job#:  [id number]    CC:KATHLEEN ROSARIO MD

## 2023-08-17 NOTE — PROGRESS NOTES
0700- Assumed care of patient. Discussed POC. No questions or concerns at this time. Infant in NBN due to cold temperature     1200- Dr. Bryan notified of pt's elevated white count. Received telephone orders to cancel discharge.

## 2023-08-17 NOTE — PROGRESS NOTES
1900: Report received. POC discussed.     2047: Vaginal delivery of viable male.    2300: Report given to CHASE Salas. POC discussed. FOB at bedside. All belongings accounted for.

## 2023-08-17 NOTE — LACTATION NOTE
This note was copied from a baby's chart.  Initial Consult:     History:    at 40w+3d. Maternal hx of CHTN and high BMI.  Baby has been cold x2 requiring radiant warmer in NBN.      History of BF: None.  This is MOB first baby      Report of Current Breastfeeding Status: MOB reports baby latches for approx 2-3min before falling asleep.  Has been hand expressing colostrum into baby's mouth.     Breastfeeding Assistance: Provided breastfeeding assistance with: positioning of baby at the right breast in football position.  MOB was taught to place baby tummy to tummy and nipple to nose, wedging of the breast, and how to achieve deep latch.  Demonstrated and taught MOB how to perform hand massage and hand expression, MOB able to hand express colostrum independently. Infant would latch deeply with nutritive suck for 2min before falling asleep and loosing latch.     Provided breastfeeding education on: hunger cues, frequency/duration of breastfeeds, skin to skin, cluster feeding, shallow vs deep latch, nutritive vs non-nutritive suck.     Plan: Continue to offer infant the breast per feeding cues for a minimum of 8 or more feeds in a 24 hour period.  Frequent skin to skin as MOB awake and attentive.  Hand express colostrum into baby's mouth if unable to sustain latch.  If baby continues to have latch score of 6 or less at 24HOL, begin pumping with Ameda [place] double pump.  Pump breasts with Ameda HG breast pump 80 cpm decrease to 60 cpm at 2min.  Suction between 20-40%.  Total time 15min, followed with 1-2min hand expression on each breast.  Repeat q3hr    Referral sent to [facility]

## 2023-08-18 VITALS
OXYGEN SATURATION: 94 % | RESPIRATION RATE: 18 BRPM | TEMPERATURE: 96.9 F | WEIGHT: 293 LBS | HEART RATE: 79 BPM | HEIGHT: 66 IN | SYSTOLIC BLOOD PRESSURE: 106 MMHG | DIASTOLIC BLOOD PRESSURE: 59 MMHG | BODY MASS INDEX: 47.09 KG/M2

## 2023-08-18 ASSESSMENT — PAIN DESCRIPTION - PAIN TYPE: TYPE: ACUTE PAIN

## 2023-08-18 NOTE — DISCHARGE SUMMARY
DATE OF ADMISSION:  08/15/2023   DATE OF DISCHARGE:  2023      ADMITTING DIAGNOSES:  1.  Intrauterine pregnancy at 40 and 1/7 weeks.  2.  Morbid obesity with a BMI of 50.  3.  History of cervical incompetence, status post Kelley cerclage placement   and removal.  4.  Group B Strep negative.  5.  Favorable cervix.  6.  History of preexisting hypertension, well controlled, not requiring   medication.  7.  Aneurysmal foramen ovale seen on ultrasound performed by Dr. Wang and    echo is recommended.     Please see previously dictated history and physical.     HOSPITAL COURSE:  The patient was admitted to labor and delivery with the   above diagnoses.  She subsequently underwent a normal spontaneous vaginal   delivery on 2023 of a viable male infant, weight 8 pounds 4.8 ounces,   Apgars 8 and 9.  She had a very small perineal separation, which was   reapproximated using one figure-of-eight 2-0 Vicryl.  She had an estimated   blood loss for the delivery of 200 mL. On postpartum day 1, the patient was   breastfeeding, had minimal lochia, no problems voiding and was ready to be   discharged home if baby was released.  She was going to use over-the-counter   ibuprofen for pain.     PHYSICAL EXAMINATION ON DISCHARGE:  VITAL SIGNS:  Temperature is 97.3, pulse 102, blood pressure 109/59  ABDOMEN:  Obese, soft, nontender.  Fundus is firm below the umbilicus.  EXTREMITIES:  Show +1 pedal edema.  She has no cords or Homans sign.     LABORATORY DATA:     08/15/23 22:40 23 09:09   WBC 19.2 (H) 31.2 (H)   Hemoglobin 12.8 10.5 (L)   Hematocrit 37.4 31.5 (L)   Platelet Count 309 245    Blood type is O positive. Group B Strep status is negative.     DISCHARGE DIAGNOSES:  1.  Intrauterine pregnancy at 40 and 2/7 weeks.  2.  Status post normal spontaneous vaginal delivery.  3.  Induction of labor for preexisting hypertension, not requiring   medications.  The patient did not require any medications during her    hospitalization for blood pressure derangements.     DISCHARGE INSTRUCTIONS:  1.  The patient will be discharged home with instructions to follow up with   Dr. Bryan in 2 weeks for blood pressure check.  2.  She is instructed on pelvic rest for 6 weeks.  3.  She will take over-the-counter ibuprofen and prenatal vitamins.  4.  We did review blood pressure parameters to call for systolic blood   pressures greater than 160 or diastolics greater than 106.       Heri Gonzales M.D.

## 2023-08-18 NOTE — PROGRESS NOTES
PP day #2---baby needed to stay til today     S:  PT doing well.  Minimal lochia.  Breast feeding.  No problems voiding.   Wants to go home if baby released.  OTC Motrin for pain     O: T 97.3 P 102  /59  ABD: Soft, NT, FF below umb  EXT +1 pedal edema, no cords or Homans     08/15/23 22:40 23 09:09   WBC 19.2 (H) 31.2 (H)   Hemoglobin 12.8 10.5 (L)   Hematocrit 37.4 31.5 (L)   Platelet Count 309 245     O+  GBS neg     A/P:  PP day #2 S/P  at term, IOL for pre-existing hypertension - doing well   D/C home  F/U Dr. Bryan 2 weeks and 6 weeks  OTC Motrin and PNV  Pelvic rest for 6 weeks  BP parameters reviewed

## 2023-08-18 NOTE — CARE PLAN
Problem: Psychosocial - Postpartum  Goal: Patient will verbalize and demonstrate effective bonding and parenting behavior  Outcome: Progressing     Problem: Altered Physiologic Condition  Goal: Patient physiologically stable as evidenced by normal lochia, palpable uterine involution and vitals within normal limits  Outcome: Progressing   The patient is Stable - Low risk of patient condition declining or worsening    Shift Goals  Clinical Goals: pain control; lochia WDL  Patient Goals: Healthy Baby and Healthy Mom  Family Goals: Support    Progress made toward(s) clinical / shift goals:  pt has been caring for self and baby. Breastfeeding has improved throughout the shift with longer feeding sessions. Pt states no soreness at breasts. Lochia has been WDL. Education handout was given to the pt for review.     Patient is not progressing towards the following goals:

## 2023-08-18 NOTE — DISCHARGE INSTRUCTIONS

## 2023-08-18 NOTE — LACTATION NOTE
Follow up lactation visit:    0845-Attempted to meet with Lisa to provide follow up lactation support. She and baby are sleeping at this time.    1100-Met with Lisa and her baby. He has recently returned to the room from the nursery after his circumcision. Lisa reports that baby has begun latching for longer durations; overnight he began cluster feeding, and he is now spending 10-20 minutes at the breast per feeding.     Discharge home planned for later today. Lisa states that her nipples are slightly tender at the beginning of feedings, but denies any sharp or pinching sensations; she reports that the tenderness resolves within a minute of latching.     Lactation consultant offered breast assessment and latch assistance; patient to call for evaluation/assistance with baby's next feeding.    Feeding plan:    Continue with cue-based breastfeeding, at least once every three hours, for a total of 8+ feedings per day.    Patient to follow up with [facility], as desired.

## 2023-08-18 NOTE — PROGRESS NOTES
Report received from Richelle STONE. Pt assessment complete. Pt requesting Motrin for soreness. Pt is breastfeeding baby ans has been able to latch him on independently. Pt aware to call for assistance at any time.

## 2023-09-06 ENCOUNTER — NON-PROVIDER VISIT (OUTPATIENT)
Dept: OBGYN | Facility: CLINIC | Age: 33
End: 2023-09-06
Payer: COMMERCIAL

## 2023-09-06 NOTE — PROGRESS NOTES
Summary: Breastfeeding every 2-3 hours throughout the day, occasionally up to 6 hours at night. Offering both breasts for most feedings, total feeding taking an average of 30 minutes. Offering occasional replacement bottles at night, 2-3oz. Pumping after 1-2 feedings, approximately 30-60 minutes later, yielding 20-40mls of the left and 60-80mls on the right.   Today: Baby latched to both breasts, football hold, with ease. Transferred 12mls from the left breast and 32mls from the right breast. Pumped with HGP, yielded 60mls total.   Plan: Feed frequently throughout the day, every 1.5-3 hours, up to 3-4 hours during the night. When latching, offer both breast for 10 minutes each side. Offer 1-1.5oz after breastfeeding. Pump after or in place of most feedings, goal of 8x daily. Strongly encouraged to alternate between breastfeeding, pumping and bottle feeding AND pumping and bottle feeding throughout the day, from 6am to 10pm. Recommended to pump and bottle feeding for all feedings from 10pm to 6am. If not breastfeeding offer 2-2.5oz during the day and 2.5-3oz during the night.     Follow up:   Lactation appointment:    Baby 's Provider appointment: 2 Month Well Child Check   Referrals: None    Subjective:     Lisa Whaley is a 32 y.o. female here for lactation care. She is here today with  partner, Rhea and baby, Bebeto .    Concerns: Weight check, Infant feeding evaluation, and Breastfeeding questions     HPI:   Pertinent  history:     Mother does not have a history of advanced maternal age, GDM, hypertension prior to pregnancy, GHTN, insulin resistance, multiple gestation, PCOS, thyroid disease, auto immune disease , placenta encapsulation, and breast surgery    Breast changes in pregnancy: Yes  History of breast surgeries: No    FEEDING HISTORY:    Previous Breastfeeding History: First baby.   Hospital Course: Exclusively .   Currently 2023:  Breastfeeding every 2-3 hours throughout the day, occasionally up to 6 hours at night. Offering both breasts for most feedings, total feeding taking an average of 30 minutes. Offering occasional replacement bottles at night, 2-3oz. Pumping after 1-2 feedings, approximately 30-60 minutes later, yielding 20-40mls of the left and 60-80mls on the right.     Both breasts: Yes    Supplement: No, offering occasional replacement bottles   Quantity: 2-3oz   How given/devices: Bottle  Bottle/nipple type: Dr. Brown, preemie nipple     Nipple Shield Use: None    Breast Pumping:  Frequency: 1-2x  Quantity Obtained: R 60-80mls L  20-40mls  Type of Pump: Spectra  Flange size/type: 24mm  Wearable: No  NO pain with pumping    Maternal ROS:  Constitutional: No fever, chills. Feeling well  Breasts: No soreness of breasts and No soreness of nipples  Psychiatric: Managing ok  Mental Health: No mention of feeling irritable, agitated, angry, overwhelmed, apathetic, appetite changes, exhausted nor having sleep changes outside infant feeds/demands.  Current Outpatient Medications on File Prior to Visit   Medication Sig Dispense Refill    Prenatal MV-Min-Fe Fum-FA-DHA (PRENATAL 1 PO) Take  by mouth.       No current facility-administered medications on file prior to visit.      Past Medical History:   Diagnosis Date    Dysuria 5/1/2014    No significant past medical history        Objective:     Maternal Physical Lactation Exam  General: No acute distress  Breasts: Symmetrical  and Soft  Nipples: intact  Psychiatric: Normal mood and affect. Her behavior is normal. Judgment and thought content normal.  Mental Health: Did NOT exhibit sadness, crying, feeling overwhelmed, agitation or hypervigilance.    Assessment/Plan & Lactation Counseling:     Infant Exam on Infant Chart    Infant Weight History:   08/16/2023: 8# 4.8oz  08/21/2023: 7# 12.2oz (Ped)  08/30/2023: 8# 2oz (Ped)  09/06/2023: 7# 13.1oz    Infant Intake at Breast:   L  12mls    R   32mls    Total: 44mls  Milk Transfer at this feeding:   Ineffective breastfeeding; not able to transfer a full feed from breast r/t     Pumped:   Type of Pump: Hospital grade   Quantity Pumped: L 20mls    R 40mls    Total: 60mls  Initiation of Feeding: Infant initiates  Position of Feeding:    Right: football  Left: football  Attachment Achieved: rapidly  Nipple shield: N/A   Suck Pattern at the Breast: Suck burst and normal rest  Suck Pattern on the Bottle: Suck burst and normal rest  Behavior Following Observed Feeding: content  Nipple Pain: None     Latch: Mom latches independently  Suckling/Feeding: attaches, baby falls asleep, baby roots, elicits MATEUS, intermittent swallows, and rhythmic  Sucking strength: Moderate Strong  Sucking Rhythm Coordinated   Compression: WNL    Once latched, baby fell into a mature and fully integrated SSB pattern.    Swallowing No difficulty noted  Milk Supply Available: normal      MATERNAL PERSONALIZED BREASTFEEDING PLAN  Discussed concerns and symptoms as listed above in assessment and guidance summarized below. Shared decision making was used between myself and the family for this encounter, home care, and follow up. Topics reviewed included:   4th Trimester: The 12-week period immediately after mom has had the baby. Not everyone has heard of it, but every mother and their  baby will go through it. It is a time of great physical and emotional change as the baby adjusts to being outside the womb, and the family adjusts to new life as parents  During the fourth trimester, one can expect fussiness and crying from the baby and very likely exhaustion for the family. [place] babies are learning to adjust to life outside the womb where it was warm and squishy!  There is a lot of misinformation about babies and their needs, and parents are often encouraged to ignore baby's signals. Bad idea. Babies are “half-baked” at birth and have much to learn with the help of physical and  "emotional support from caregivers. Taking care of a baby's needs is an investment that pays off with a happier, healthier child and adult.  It can take weeks or even months for your body to feel totally normal again. There is a major hormonal upheaval experienced by moms in the first few weeks after birth, because their bodies are shifting from many pregnancy hormones to a more normal hormonal make-up.  These first three months with baby earth side is a delicate time. Honor it with a mindful dose of support. Mindful Mamma's is an [place] that may help.   Self-Compassion through Relational Support and Interaction.   Be kind to ourself. This is the first step in reducing anxiety and depression. Pay attention to how you are doing.   What do you need? Food, Rest, Sleep, Support, to Laugh/giggle: who will you ask today? They want to help you. We want to help you.   How do you stop your self-blame, or your self-criticism?   Get out of the house each day, walk or drive somewhere or ask a friend to drive you,  a \"treat\" at a drive through.   Mood and Anxiety Disorders: Having a new baby can be joyful time for some new moms, but a difficult time for others. For all, it is a time of profound physical and emotional change. Balancing baby, family, partners and friends, work, pets, and preexisting or new life stressors as well as sleep deprivation can be extremely challenging. Untreated depression, sleep exhaustion, and anxiety are each a challenge that must be addressed and in addition can contribute to early cessation of breastfeeding. It is important both for the mental health and physical health of new moms and babies to get on the path to feeling better and if therapeutic support is needed, specific resources are available.   Sleep or Lack of: Human Giver Syndrome (moms) tells us it’s “self-indulgent” to rest and sleep, which makes as much sense as believing it’s weak or self indulgent to breathe. We discussed " strategies to manage restorative sleep, although short amounts, significant to the mental health of the mother. The principle follows:  Mom goes to sleep right after 8pm +/- feeding  Partner covers first late evening feeding (10pm/11pm), settles baby,  then goes to bed  Mom covers next feeding 1am /2am, partner sleeps  Next feeding share  Milk Supply is dependent on glandular tissue development, hormonal influences, how many times the baby removes milk and how well the breasts are emptied in a 24 hour period. This is a biological reality that we can NOT work around. If, for any reason, your baby is not latching, or you are not able to nurse, then it is important for you to remove the milk instead by pumping or hand expression.  There's no magic trick, tea, food, drink, cookie or supplement that will increase your milk supply. One  must  effectively remove milk to continue to make and maximize milk. In the early days and weeks that can be 8+ times in 24 hours. For older babies, on average 6-7 + times in 24 hours.    Hydration: Staying hydrated is important however lack of hydration is usually not a cause of significantly low milk production.  Everyone needs a different amount of water, depending on their activity and diet. A high salt and/or high-protein diet, high physical activity, or very warm weather/sweating will require more fluids. A person eating a diet high in veggies and fruit, with a lack of physical activity will require fewer fluids. There is no magic number for the # of ounces of water each day.The best way to know that you are well hydrated is by looking at your urine.  Urine should look clear to light pale yellow, and you should need to pee at least every 3 to 4 hours unless you have a large bladder capacity.  Herbs and Medications: A galactagogue is an herb or medication taken by a breastfeeding mother to increase her milk supply. We know that for centuries mothers around the world have sought out  remedies to increase their milk supply. However, there is limited research on the safety and effectiveness of herbal galactagogues, which makes it hard for us to endorse them. It is not known if any of these herbs are truly effective, and it is difficult to predict how a specific herbal galactagogue will affect an individual, requiring “trial and error” in many situations. When effective, results are generally seen within 24-72 hours of starting an herbal galactagogue. Many of these herbs are used to decrease high blood sugar. If you are diabetic or have problems with low blood sugar, or thyroid disease you may not be a candidate for herbs. Not all women can increase their low milk supply with a galactagogue due to the many underlying causes of low milk production.  When taking a galactagogue, remember that frequent milk removal is still the most effective way to increase supply.   Feeding:   Infant difficulty with feeding, slow growth. Guidelines to follow:  Feed your baby every 1.5-3 hours, more often if baby acts hungry.   Awaken baby for feeding if going over 3 hours in the day.   Until back to birth weight, ONE four hour at night is acceptable if has had 8 prior feedings in 24 hours.    Daily goal: 8-12 feedings per 24 hours.   Supplement:   Supplement with expressed milk  Offer 1-1.5oz after breastfeeding  Offer 2-2.5oz in place of breastfeeding during the day  Offer 2.5-3oz in place of breastfeeding during the night  Pacing the feeding:  A slow flow nipple helps, but how you feed the baby is more important.  How you are  positioning the bottle can compensate for a faster flow nipple.  When bottle-feeding, the baby should control how much is consumed at a feeding.  Holding the baby in an upright position with the bottle horizontal ensures that the baby gets milk only when sucking.  Here is a nice video demonstrating this concept of paced bottle feeding,  https://www.youAuditionBooth.com/watch?v=QsXUO0tQD8N Think baby  led, not parent led.  Pacifier Use:  The American Academy of Pediatrics' Position Paper reports: Although we recommend a conservative approach regarding pacifier use, we do not endorse a complete ban on the use of pacifiers, nor do we support an approach that induces parental guilt concerning their choices about the use of pacifiers. Pacifier use and breastfeeding in term and  newborns- a systematic review and meta-analysis from the  J of Pediatrics Published online 2022. Has found that when pacifiers are used among individuals who have been counseled on the risks, do not interfere with breastfeeding exclusivity or duration. These are parental choices.   Pumping Guidelines:  Both breasts 8x in 24 hours.  After or in place of breastfeeding for 15 minutes  Type of Pump:  Hospital grade and Spectra  Geomagic [place] Settings http://www.[place]/healthcare-professionals/videos/ameda-platinum-with-hygienikit-video   Speed: Start at 80 then turn down to 60 after 1.5-2 minutes when you see milk in the flange channel  Suction: To comfort, goal of  30-50%. NEVER to discomfort.   Today you were at 30   Spectra Settings  Press letdown button when first starting         Cycle: 70 / Vacuum: L1 - L 5 (To comfort)  Once milk lets down, press letdown button again  Cycle: 54 / Vacuum: L1 - L12 (To comfort)  To check the hours on the Spectra  https://RealCrowd.eSpark/how-to-check-the-hours-on-your-spectra/  Caution with Breast Massage: The word “Massage” means different things in the breastfeeding world. It is described and interpreted in so many different ways and unfortunately potentially harmful. The hands can be safe on a breast and  gentle to help in many ways but they also can be damaging when used in the wrong way.  Lymphatic drainage massage is appropriate,  open hand , lightly stroking only, and can be highly effective. The massage advice to knead , massage deeply , vibrate with marketed tools is   most concerning. Be gentle with this gland to not increase inflammation.   Storage (Acceptable guidelines for healthy term babies)  10 hours at room temp including your pieces, may rinse but not mandatory  8 days refrigerator, don't need  to refrigerate right away if using fresh pumped milk at the next feeding  Freezer 1 year  Deep freezer 2 years  American Academy or Pediatrics has said you may mix different temperature milks.   If baby drinks breast milk from a fresh or refrigerated bottle of breast milk,  you may return the unused portion to the refrigerator and use once at next feeding.   Breast Care  Plugs (a colloquial term for microscopic ductal inflammation and narrowing)  Inflammatory or infectious mastitis, breastpain including engorgement  or vasospasm  Breast rest - No Massage, don't overfeed of over pump, down regulate production if needed  Advil 800mg every 8 hours for 48 hours with food  Ice - 10 minutes  after feeding then every 30 minutes or as desired for repeating the ice. Don't put the ice directly on the skin.  May add Tylenol 1000mg every 8 hours for 48 hours in between the Advil dosing if needed for pain.  Answers to FAQs: https://www.infantrisk.com/category/breastfeeding  Alcohol & Breastfeeding: What's your time-to-zero?  Cough & Cold Medications while Breastfeeding  Vitamin D Supplementation and Breastfeeding  Antidepressant Use While Breastfeeding: What should I know?  Breastfeeding, Caffeine, and Energy Drinks  Connect with other mothers:  Facebook:   [place] Breastfeeds: https://www.[place].com/[place].breastfeeds/  Well-Nourished Babies (Private group for questions and support): https://www.facebook.com/groups/787257244643873/  Breastfeeding [place] including opportunity to weight your baby and do before and after weights WEIGHT CHECKS  Tuesdays 10am - 11am. [facility], [address], 3rd floor conference room  Check your baby's weight, do a feeding and see how your  baby is growing, visit with other mothers, plan on a walk or coffee date after group.  Please download Growth: Baby and Child for Apple or Child Growth Tracker for Androids if you would like to  document and follow your baby's growth curve.  Due to space limitations - limit strollers please (New c/section moms please use your stroller).  We would love to have dads stay, but moms won't breastfeed if there are men in the room, sorry.  The room is generally scheduled for another event following group.  Please take all diapers with you   ONLINE SUPPORT GROUPS  Postpartum Support International (PSI) support groups are conducted using a bdmq-rd-wqyn support model and are not intended for those experiencing a mental health crisis. Groups are 90 minutes (1.5 hours) in length. The first ~30 minutes is providing information, education, and establishing group guidelines. The next ~60 minutes is “talk time,” in which group members share and talk with each other. Group members must be present for the group guidelines before joining in the discussion or “talk time.”         In Conclusion:   Managing breastfeeding and milk supply is very dynamic,can be a complex and intimate journey, and is not one size fits all. When obstacles present themselves, it takes confidence, persistence and support. The rights of the child include optimal nutrition and mothers need help to make informed decisions. You  and your baby have been screened for biological, psychological, and social risk factors that might interfere with achieving your goals.  Support is critical. We want the family thriving not just tolerating life. You are now the focus of our Breastfeeding Medicine team; we are here to support your decisions and vision.     Follow up requires close monitoring in this time sensitive window of opportunity to establish milk supply and facilitate the learning of  breastfeeding.    Please call [phone] our voicemail line or the front office at  [phone] to scheduled your next appointment.  Family is encouraged to e-mail or mychart us to update how the plan is working.    A total of 70 minutes, including infant assessment time,  was spent preparing to see the patient, obtaining and reviewing separately obtained history, performing a medically appropriate examination and evaluation, counseling and educating the family, referring and communicating with other health care professionals, documenting clinical information in the electronic health record, independently interpreting weighted feeds and infant growth results, communicating these results to the family and care coordination as detailed in the above note.       Babs Hartmann

## 2023-09-14 ENCOUNTER — NON-PROVIDER VISIT (OUTPATIENT)
Dept: OBGYN | Facility: CLINIC | Age: 33
End: 2023-09-14
Payer: COMMERCIAL

## 2023-09-14 NOTE — PROGRESS NOTES
Summary: Breastfeeding every 2.5-3 hours during the day, waking baby every 3.5 hours at night. Latching 4x daily then topping off with 45mls. If not breastfeeding, offering 2-2.5oz. Pumping after or in place of breastfeeding. Yielding 20-40mls on the left and 40-80mls on the right.   Today: Baby latched to both breasts, football hold. Transferred 14mls from the left breast and 18mls from the right breast, implementing nipple shield half way through the right side. Pumped with HGP, yielded 90mls total.   Plan: Feed frequently throughout the day, every 1.5-3 hours, no need to wake baby for nighttime feedings. When latching, offer both breast for 10 minutes each side. Offer 1-1.5oz after breastfeeding. Pump after or in place of most feedings, goal of 8x daily. Strongly encouraged to continue to alternate between breastfeeding, pumping and bottle feeding AND pumping and bottle feeding throughout the day, from 6am to 10pm. Recommended to pump and bottle feeding for all feedings from 10pm to 6am. If not breastfeeding offer 2-2.5oz during the day and 2.5-3oz during the night.     Follow up:   Lactation appointment:  2023  Baby 's Provider appointment: 2 Month Well Child Check   Referrals: None    Subjective:     Lisa Whaley is a 32 y.o. female here for lactation care. She is here today with  partner, Rhea and baby, Bebeto .    Concerns: Weight check, Infant feeding evaluation, and Breastfeeding questions     HPI:   Pertinent  history:     Mother does not have a history of advanced maternal age, GDM, hypertension prior to pregnancy, GHTN, insulin resistance, multiple gestation, PCOS, thyroid disease, auto immune disease , placenta encapsulation, and breast surgery    Breast changes in pregnancy: Yes  History of breast surgeries: No    FEEDING HISTORY:    Previous Breastfeeding History: First baby.   Hospital Course: Exclusively .   Prior to consultation on 2023:  Breastfeeding every 2-3 hours throughout the day, occasionally up to 6 hours at night. Offering both breasts for most feedings, total feeding taking an average of 30 minutes. Offering occasional replacement bottles at night, 2-3oz. Pumping after 1-2 feedings, approximately 30-60 minutes later, yielding 20-40mls of the left and 60-80mls on the right.   Currently 9/14/2023: Breastfeeding every 2.5-3 hours during the day, waking baby every 3.5 hours at night. Latching 4x daily then topping off with 45mls. If not breastfeeding, offering 2-2.5oz. Pumping after or in place of breastfeeding. Yielding 20-40mls on the left and 40-80mls on the right.     Both breasts: Yes    Supplement: Expressed Breastmilk   Quantity: 2.5-3oz   How given/devices: Bottle  Bottle/nipple type: Dr. Brown, Level 1    Nipple Shield Use: None    Breast Pumping:  Frequency: 7-8x  Quantity Obtained: R 80mls  L  40mls  /   R   40-50mls  L  20mls  Type of Pump: Spectra  Flange size/type: 24mm  Wearable: No  NO pain with pumping    Maternal ROS:  Constitutional: No fever, chills. Feeling well  Breasts: No soreness of breasts and No soreness of nipples  Psychiatric: Managing ok  Mental Health: No mention of feeling irritable, agitated, angry, overwhelmed, apathetic, appetite changes, exhausted nor having sleep changes outside infant feeds/demands.  Current Outpatient Medications on File Prior to Visit   Medication Sig Dispense Refill    Prenatal MV-Min-Fe Fum-FA-DHA (PRENATAL 1 PO) Take  by mouth.       No current facility-administered medications on file prior to visit.      Past Medical History:   Diagnosis Date    Dysuria 5/1/2014    No significant past medical history        Objective:     Maternal Physical Lactation Exam  General: No acute distress  Breasts: Symmetrical  and Soft  Nipples: intact  Psychiatric: Normal mood and affect. Her behavior is normal. Judgment and thought content normal.  Mental Health: Did NOT exhibit sadness, crying, feeling  overwhelmed, agitation or hypervigilance.    Assessment/Plan & Lactation Counseling:     Infant Exam on Infant Chart    Infant Weight History:   08/16/2023: 8# 4.8oz  08/21/2023: 7# 12.2oz (Ped)  08/30/2023: 8# 2oz (Ped)  09/06/2023: 7# 13.1oz  09/14/2023: 8# 6.3oz     Infant Intake at Breast:   L  14mls    R  18mls    Total: 32mls  Milk Transfer at this feeding:   Ineffective breastfeeding; not able to transfer a full feed from breast r/t     Pumped:   Type of Pump: Hospital grade   Quantity Pumped: L 25mls    R 65mls    Total: 90mls  Initiation of Feeding: Infant initiates  Position of Feeding:    Right: football and cross cradle   Left: football  Attachment Achieved: rapidly  Nipple shield: N/A   Suck Pattern at the Breast: Suck burst and normal rest  Suck Pattern on the Bottle: Suck burst and normal rest  Behavior Following Observed Feeding: content  Nipple Pain: None     Latch: Mom latches independently  Suckling/Feeding: attaches, baby falls asleep, baby roots, elicits MATEUS, intermittent swallows, and rhythmic  Sucking strength: Moderate Strong  Sucking Rhythm Coordinated   Compression: WNL    Once latched, baby fell into a mature and fully integrated SSB pattern.    Swallowing No difficulty noted  Milk Supply Available: normal+      MATERNAL PERSONALIZED BREASTFEEDING PLAN  Discussed concerns and symptoms as listed above in assessment and guidance summarized below. Shared decision making was used between myself and the family for this encounter, home care, and follow up. Topics reviewed included:   Feeding:   Infant difficulty with feeding, slow growth. Guidelines to follow:  Feed your baby every 1.5-3 hours, more often if baby acts hungry.   Awaken baby for feeding if going over 3 hours in the day.   No need to wake baby for nighttime feedings.  Daily goal: 8-10 feedings per 24 hours.   Supplement:   Supplement with expressed milk  Offer 1-1.5oz after breastfeeding  Offer 2-2.5oz in place of breastfeeding  during the day  Offer 2.5-3oz in place of breastfeeding during the night  Pumping Guidelines:  Both breasts 8x in 24 hours.  After or in place of breastfeeding for 15 minutes  Type of Pump:  Hospital grade and Spectra  Ameda [place] Settings [email or web address]/healthcare-professionals/videos/ameda-platinum-with-hygienikit-video   Speed: Start at 80 then turn down to 60 after 1.5-2 minutes when you see milk in the flange channel  Suction: To comfort, goal of  30-50%. NEVER to discomfort.   Today you were at 30   Spectra Settings  Press letdown button when first starting         Cycle: 70 / Vacuum: L1 - L 5 (To comfort)  Once milk lets down, press letdown button again  Cycle: 54 / Vacuum: L1 - L12 (To comfort)  To check the hours on the Spectra  https://[employer]/how-to-check-the-hours-on-your-spectra/  Storage (Acceptable guidelines for healthy term babies)  10 hours at room temp including your pieces, may rinse but not mandatory  8 days refrigerator, don't need  to refrigerate right away if using fresh pumped milk at the next feeding  Freezer 1 year  Deep freezer 2 years  American Academy or Pediatrics has said you may mix different temperature milks.   If baby drinks breast milk from a fresh or refrigerated bottle of breast milk,  you may return the unused portion to the refrigerator and use once at next feeding.   Answers to FAQs: https://www.infantrisk.com/category/breastfeeding  Alcohol & Breastfeeding: What's your time-to-zero?  Cough & Cold Medications while Breastfeeding  Vitamin D Supplementation and Breastfeeding  Antidepressant Use While Breastfeeding: What should I know?  Breastfeeding, Caffeine, and Energy Drinks  Connect with other mothers:  Facebook:   [place] Breastfeeds: [email or web address]  Well-Nourished Babies (Private group for questions and support): https://www.facebook.com/groups/893927955880556/  Breastfeeding [place] including opportunity to weight your  baby and do before and after weights WEIGHT CHECKS  Tuesdays 10am - 11am. Women's Health at [address] [place] [address], [address], 3rd floor conference room  Check your baby's weight, do a feeding and see how your baby is growing, visit with other mothers, plan on a walk or coffee date after group.  Please download Growth: Baby and Child for Apple or Child Growth Tracker for Andro[employer] if you would like to  document and follow your baby's growth curve.  Due to space limitations - limit strollers please (New c/section moms please use your stroller).  We would love to have dads stay, but moms won't breastfeed if there are men in the room, sorry.  The room is generally scheduled for another event following group.  Please take all diapers with you   ONLINE SUPPORT GROUPS  Postpartum Support International (PSI) support groups are conducted using a mznw-ct-mbmv support model and are not intended for those experiencing a mental health crisis. Groups are 90 minutes (1.5 hours) in length. The first ~30 minutes is providing information, education, and establishing group guidelines. The next ~60 minutes is “talk time,” in which group members share and talk with each other. Group members must be present for the group guidelines before joining in the discussion or “talk time.”         In Conclusion:   Managing breastfeeding and milk supply is very dynamic,can be a complex and intimate journey, and is not one size fits all. When obstacles present themselves, it takes confidence, persistence and support. The rights of the child include optimal nutrition and mothers need help to make informed decisions. You  and your baby have been screened for biological, psychological, and social risk factors that might interfere with achieving your goals.  Support is critical. We want the family thriving not just tolerating life. You are now the focus of our Breastfeeding Medicine team; we are here to support your decisions and vision.     Follow up  requires close monitoring in this time sensitive window of opportunity to establish milk supply and facilitate the learning of  breastfeeding.    Please call [phone] our voicemail line or the front office at [phone] to scheduled your next appointment.  Family is encouraged to e-mail or mychart us to update how the plan is working.    A total of 60 minutes, including infant assessment time,  was spent preparing to see the patient, obtaining and reviewing separately obtained history, performing a medically appropriate examination and evaluation, counseling and educating the family, referring and communicating with other health care professionals, documenting clinical information in the electronic health record, independently interpreting weighted feeds and infant growth results, communicating these results to the family and care coordination as detailed in the above note.       Babs Hartmann

## 2023-09-22 ENCOUNTER — NON-PROVIDER VISIT (OUTPATIENT)
Dept: OBGYN | Facility: CLINIC | Age: 33
End: 2023-09-22
Payer: COMMERCIAL

## 2023-09-22 NOTE — PROGRESS NOTES
Summary: Feeding every 3 hours throughout the day, up to 4-6 hours at night. Latching for every other feeding during the day, topping off with 1.5-2oz. If not breastfeeding, offering 2.5-3oz. Pumping after or in place of breastfeeding, yielding 4oz for most sessions during the day and up to 6oz after long stretch at night.   Today: Baby fussy and upset before attempting to latch. Offered .5oz hoping to settle him, he did settle and latch with periods of fussiness, transferred 4mls. Fed bottle of expressed breastmilk. Pumped with HGP, yielded 4oz total.   Plan: Feed frequently throughout the day, every 1.5-3 hours, no need to wake baby for nighttime feedings. When latching, offer both breast for 10-15 minutes each side if the feeding is going well. If content, no need to offer top off bottle. If still showing hunger cues, offer .5-1oz, more if needed. If not breastfeeding offer 2.5-3oz.  Pump after or in place of most feedings, goal of 8x daily. Strongly encouraged to continue to alternate between breastfeeding, pumping and bottle feeding AND pumping and bottle feeding throughout the day, from 6am to 10pm. Recommended to pump and bottle feeding for all feedings from 10pm to 6am.    Follow up:   Lactation appointment:  2023  Baby 's Provider appointment: 2 Month Well Child Check   Referrals: None    Subjective:     Lisa Whaley is a 32 y.o. female here for lactation care. She is here today with  partner, Rhea and baby, Bebeto .    Concerns: Weight check, Infant feeding evaluation, and Breastfeeding questions     HPI:   Pertinent  history:     Mother does not have a history of advanced maternal age, GDM, hypertension prior to pregnancy, GHTN, insulin resistance, multiple gestation, PCOS, thyroid disease, auto immune disease , placenta encapsulation, and breast surgery    Breast changes in pregnancy: Yes  History of breast surgeries: No    FEEDING HISTORY:    Previous  Breastfeeding History: First baby.   Hospital Course: Exclusively .   Prior to consultation on 9/6/2023: Breastfeeding every 2-3 hours throughout the day, occasionally up to 6 hours at night. Offering both breasts for most feedings, total feeding taking an average of 30 minutes. Offering occasional replacement bottles at night, 2-3oz. Pumping after 1-2 feedings, approximately 30-60 minutes later, yielding 20-40mls of the left and 60-80mls on the right.   Prior to consultation on 9/14/2023: Breastfeeding every 2.5-3 hours during the day, waking baby every 3.5 hours at night. Latching 4x daily then topping off with 45mls. If not breastfeeding, offering 2-2.5oz. Pumping after or in place of breastfeeding. Yielding 20-40mls on the left and 40-80mls on the right.   Currently 9/22/2023: Feeding every 3 hours throughout the day, up to 4-6 hours at night. Latching for every other feeding during the day, topping off with 1.5-2oz. If not breastfeeding, offering 2.5-3oz. Pumping after or in place of breastfeeding, yielding 4oz for most sessions during the day and up to 6oz after long stretch at night.     Both breasts: Yes    Supplement: Expressed Breastmilk   Quantity: 2.5-3oz   How given/devices: Bottle  Bottle/nipple type: Dr. Brown, Level 1    Nipple Shield Use: None    Breast Pumping:  Frequency: 7-8x  Quantity Obtained: 4-6oz  Type of Pump: Spectra and Platinum   Flange size/type: 24mm  Wearable: No  NO pain with pumping    Maternal ROS:  Constitutional: No fever, chills. Feeling well  Breasts: No soreness of breasts and No soreness of nipples  Psychiatric: Managing ok  Mental Health: No mention of feeling irritable, agitated, angry, overwhelmed, apathetic, appetite changes, exhausted nor having sleep changes outside infant feeds/demands.  Current Outpatient Medications on File Prior to Visit   Medication Sig Dispense Refill    Prenatal MV-Min-Fe Fum-FA-DHA (PRENATAL 1 PO) Take  by mouth.       No current  facility-administered medications on file prior to visit.      Past Medical History:   Diagnosis Date    Dysuria 5/1/2014    No significant past medical history        Objective:     Maternal Physical Lactation Exam  General: No acute distress  Breasts: Symmetrical  and Soft  Nipples: intact  Psychiatric: Normal mood and affect. Her behavior is normal. Judgment and thought content normal.  Mental Health: Did NOT exhibit sadness, crying, feeling overwhelmed, agitation or hypervigilance.    Assessment/Plan & Lactation Counseling:     Infant Exam on Infant Chart    Infant Weight History:   08/16/2023: 8# 4.8oz  08/21/2023: 7# 12.2oz (Ped)  08/30/2023: 8# 2oz (Ped)  09/06/2023: 7# 13.1oz  09/14/2023: 8# 6.3oz   09/22/2023: 9# 1.4oz    Infant Intake at Breast:   R  4mls   L  Not Offered     Total: 4mls  Milk Transfer at this feeding:   Ineffective breastfeeding; not able to transfer a full feed from breast r/t     Pumped:   Type of Pump: Hospital grade   Quantity Pumped: L 40mls    R 80mls    Total: 120mls  Initiation of Feeding: Infant initiates  Position of Feeding:    Right: football   Left: Not Offered   Attachment Achieved: rapidly  Nipple shield: N/A   Suck Pattern at the Breast: Suck burst and normal rest and no sucking   Suck Pattern on the Bottle: Suck burst and normal rest  Behavior Following Observed Feeding: Fussy  Nipple Pain: None     Latch: Mom latches independently  Suckling/Feeding: attaches, baby falls asleep, baby roots, elicits MATEUS, intermittent swallows, and rhythmic  Sucking strength: Moderate Strong  Sucking Rhythm Coordinated   Compression: WNL    Once latched, baby fell into a mature and fully integrated SSB pattern.    Swallowing No difficulty noted  Milk Supply Available: normal+      MATERNAL PERSONALIZED BREASTFEEDING PLAN  Discussed concerns and symptoms as listed above in assessment and guidance summarized below. Shared decision making was used between myself and the family for this  encounter, home care, and follow up. Topics reviewed included:   Feeding:   Infant growing well with feeding. Guidelines to follow:  Feed your baby every 1.5-3 hours, more often if baby acts hungry.   Awaken baby for feeding if going over 3 hours in the day.   No need to wake baby for nighttime feedings.  Daily goal: 8-10 feedings per 24 hours.   Supplement:   Supplement with expressed milk  Offer .5-1oz after breastfeeding  Offer 2.5-3oz in place of breastfeeding   Pumping Guidelines:  Both breasts 8x in 24 hours.  After or in place of breastfeeding for 15 minutes  Type of Pump:  Hospital grade and Spectra  Ameda [place] Settings [email or web address]/healthcare-professionals/videos/ameda-platinum-with-hygienikit-video   Speed: Start at 80 then turn down to 60 after 1.5-2 minutes when you see milk in the flange channel  Suction: To comfort, goal of  30-50%. NEVER to discomfort.   Today you were at 30   Spectra Settings  Press letdown button when first starting         Cycle: 70 / Vacuum: L1 - L 5 (To comfort)  Once milk lets down, press letdown button again  Cycle: 54 / Vacuum: L1 - L12 (To comfort)  To check the hours on the Spectra  https://[employer]/how-to-check-the-hours-on-your-spectra/  Storage (Acceptable guidelines for healthy term babies)  10 hours at room temp including your pieces, may rinse but not mandatory  8 days refrigerator, don't need  to refrigerate right away if using fresh pumped milk at the next feeding  Freezer 1 year  Deep freezer 2 years  American Academy or Pediatrics has said you may mix different temperature milks.   If baby drinks breast milk from a fresh or refrigerated bottle of breast milk,  you may return the unused portion to the refrigerator and use once at next feeding.   Answers to FAQs: https://www.infantrisk.com/category/breastfeeding  Alcohol & Breastfeeding: What's your time-to-zero?  Cough & Cold Medications while Breastfeeding  Vitamin D Supplementation and  Breastfeeding  Antidepressant Use While Breastfeeding: What should I know?  Breastfeeding, Caffeine, and Energy Drinks  Connect with other mothers:  Facebook:   [place] Breastfeeds: https://[email or web address].facebook.[email or web address]/[place].[email or web address]/  Well-Nourished Babies (Private group for questions and support): https://www.facebook.com/groups/632465902637017/  Breastfeeding [place] including opportunity to weight your baby and do before and after weights WEIGHT CHECKS  Tuesdays 10am - 11am. Women's Health at [address] [place] [address], [facility] [address] [place] [address], 3rd floor conference room  Check your baby's weight, do a feeding and see how your baby is growing, visit with other mothers, plan on a walk or coffee date after group.  Please download Growth: Baby and Child for Apple or Child Growth Tracker for [employer] if you would like to  document and follow your baby's growth curve.  Due to space limitations - limit strollers please (New c/section moms please use your stroller).  We would love to have dads stay, but moms won't breastfeed if there are men in the room, sorry.  The room is generally scheduled for another event following group.  Please take all diapers with you   ONLINE SUPPORT GROUPS  Postpartum Support International (PSI) support groups are conducted using a yzsi-rv-llmj support model and are not intended for those experiencing a mental health crisis. Groups are 90 minutes (1.5 hours) in length. The first ~30 minutes is providing information, education, and establishing group guidelines. The next ~60 minutes is “talk time,” in which group members share and talk with each other. Group members must be present for the group guidelines before joining in the discussion or “talk time.”         In Conclusion:   Managing breastfeeding and milk supply is very dynamic,can be a complex and intimate journey, and is not one size fits all. When obstacles present themselves, it takes confidence, persistence and support. The rights of the child include optimal  nutrition and mothers need help to make informed decisions. You  and your baby have been screened for biological, psychological, and social risk factors that might interfere with achieving your goals.  Support is critical. We want the family thriving not just tolerating life. You are now the focus of our Breastfeeding Medicine team; we are here to support your decisions and vision.     Follow up requires close monitoring in this time sensitive window of opportunity to establish milk supply and facilitate the learning of  breastfeeding.    Please call [phone] our voicemail line or the front office at [phone] to scheduled your next appointment.  Family is encouraged to e-mail or mychart us to update how the plan is working.    A total of 60 minutes, including infant assessment time,  was spent preparing to see the patient, obtaining and reviewing separately obtained history, performing a medically appropriate examination and evaluation, counseling and educating the family, referring and communicating with other health care professionals, documenting clinical information in the electronic health record, independently interpreting weighted feeds and infant growth results, communicating these results to the family and care coordination as detailed in the above note.       Babs Hartmann

## 2023-10-02 ENCOUNTER — NON-PROVIDER VISIT (OUTPATIENT)
Dept: OBGYN | Facility: CLINIC | Age: 33
End: 2023-10-02
Payer: COMMERCIAL

## 2023-10-02 NOTE — PROGRESS NOTES
Summary: Feeding every 2-3 hours throughout the day, sooner if breastfeeding and not offering top off bottle. Latching 1-2x in the morning, without a top off bottle, usually right side only. When latching in the afternoon offering 35mls after the breast. If not breastfeeding, offering 3oz. Pumping 7x every 24 hours, after or in place of breastfeeding. Averaging 24-27oz in 24 hours.   Today: Latched to both breasts, football hold. Bebeto transferred 58mls from the right breast and 14mls from the left breasts. Content to be held. Pumped for 15 minutes, yielding 50mls on the left and 110mls on the right.   Plan: Feed frequently throughout the day, every 1.5-3 hours, no need to wake baby for nighttime feedings. When latching, offer one or both breast for 10-15 minutes each side if the feeding is going well. If content, no need to offer top off bottle. If still showing hunger cues, offer .5-1oz, more if needed. Starting in the afternoon offer 2oz after the breast. If not breastfeeding offer 3oz. At night before bed add 1oz to the bottle to allow for the longer stretch of sleep. Pump after or in place of most feedings, goal of 8x daily.     Follow up:   Lactation appointment:    Baby 's Provider appointment: 2 Month Well Child Check   Referrals: None    Subjective:     Lisa Whaley is a 32 y.o. female here for lactation care. She is here today with  partner, Rhea and baby, Bebeto .    Concerns: Weight check, Infant feeding evaluation, and Breastfeeding questions     HPI:   Pertinent  history:     Mother does not have a history of advanced maternal age, GDM, hypertension prior to pregnancy, GHTN, insulin resistance, multiple gestation, PCOS, thyroid disease, auto immune disease , placenta encapsulation, and breast surgery    Breast changes in pregnancy: Yes  History of breast surgeries: No    FEEDING HISTORY:    Previous Breastfeeding History: First baby.   [facility]  Course: Exclusively .   Prior to consultation on 9/6/2023: Breastfeeding every 2-3 hours throughout the day, occasionally up to 6 hours at night. Offering both breasts for most feedings, total feeding taking an average of 30 minutes. Offering occasional replacement bottles at night, 2-3oz. Pumping after 1-2 feedings, approximately 30-60 minutes later, yielding 20-40mls of the left and 60-80mls on the right.   Prior to consultation on 9/14/2023: Breastfeeding every 2.5-3 hours during the day, waking baby every 3.5 hours at night. Latching 4x daily then topping off with 45mls. If not breastfeeding, offering 2-2.5oz. Pumping after or in place of breastfeeding. Yielding 20-40mls on the left and 40-80mls on the right.   Prior to consultation on 9/22/2023: Feeding every 3 hours throughout the day, up to 4-6 hours at night. Latching for every other feeding during the day, topping off with 1.5-2oz. If not breastfeeding, offering 2.5-3oz. Pumping after or in place of breastfeeding, yielding 4oz for most sessions during the day and up to 6oz after long stretch at night.   Currently 10/2/2023: Feeding every 2-3 hours throughout the day, sooner if breastfeeding and not offering top off bottle. Latching 1-2x in the morning, without a top off bottle, usually right side only. When latching in the afternoon offering 35mls after the breast. If not breastfeeding, offering 3oz. Pumping 7x every 24 hours, after or in place of breastfeeding. Averaging 24-27oz in 24 hours.     Both breasts: Yes    Supplement: Expressed Breastmilk   Quantity: 1-3oz   How given/devices: Bottle  Bottle/nipple type: Dr. Brown, Level 1    Nipple Shield Use: None    Breast Pumping:  Frequency: 7x  Quantity Obtained: 4-6oz  Type of Pump: Spectra and Platinum   Flange size/type: 24mm  Wearable: No  NO pain with pumping    Maternal ROS:  Constitutional: No fever, chills. Feeling well  Breasts: No soreness of breasts and No soreness of  nipples  Psychiatric: Managing ok  Mental Health: No mention of feeling irritable, agitated, angry, overwhelmed, apathetic, appetite changes, exhausted nor having sleep changes outside infant feeds/demands.  Current Outpatient Medications on File Prior to Visit   Medication Sig Dispense Refill    Prenatal MV-Min-Fe Fum-FA-DHA (PRENATAL 1 PO) Take  by mouth.       No current facility-administered medications on file prior to visit.      Past Medical History:   Diagnosis Date    Dysuria 5/1/2014    No significant past medical history        Objective:     Maternal Physical Lactation Exam  General: No acute distress  Breasts: Symmetrical  and Soft  Nipples: intact  Psychiatric: Normal mood and affect. Her behavior is normal. Judgment and thought content normal.  Mental Health: Did NOT exhibit sadness, crying, feeling overwhelmed, agitation or hypervigilance.    Assessment/Plan & Lactation Counseling:     Infant Exam on Infant Chart    Infant Weight History:   08/16/2023: 8# 4.8oz  08/21/2023: 7# 12.2oz (Ped)  08/30/2023: 8# 2oz (Ped)  09/06/2023: 7# 13.1oz  09/14/2023: 8# 6.3oz   09/22/2023: 9# 1.4oz  10/02/2023: 9# 5.8oz    Infant Intake at Breast:   R  58mls   L  14mls     Total: 72mls  Milk Transfer at this feeding:   Effective Breastfeeding     Pumped:   Type of Pump: Hospital grade   Quantity Pumped: L 50mls    R 110mls    Total: 160mls  Initiation of Feeding: Infant initiates  Position of Feeding:    Right: football   Left: football    Attachment Achieved: rapidly  Nipple shield: N/A   Suck Pattern at the Breast: Suck burst and normal rest    Suck Pattern on the Bottle: N/A  Behavior Following Observed Feeding: Content   Nipple Pain: None     Latch: Mom latches independently  Suckling/Feeding: attaches, baby falls asleep, baby roots, elicits MATEUS, intermittent swallows, and rhythmic  Sucking strength: Moderate Strong  Sucking Rhythm Coordinated   Compression: WNL    Once latched, baby fell into a mature and fully  integrated SSB pattern.    Swallowing No difficulty noted  Milk Supply Available: normal+      MATERNAL PERSONALIZED BREASTFEEDING PLAN  Discussed concerns and symptoms as listed above in assessment and guidance summarized below. Shared decision making was used between myself and the family for this encounter, home care, and follow up. Topics reviewed included:   Feeding:   Infant growing well with feeding. Guidelines to follow:  Feed your baby every 1.5-3 hours, more often if baby acts hungry.   Awaken baby for feeding if going over 3 hours in the day.   No need to wake baby for nighttime feedings.  Daily goal: 8-10 feedings per 24 hours.   Supplement:   Supplement with expressed milk  Offer 2oz after afternoon breastfeeding sessions  Offer 3oz in place of breastfeeding   Add 1oz before going to bed for the night  Pumping Guidelines:  Both breasts 7x in 24 hours.  After or in place of breastfeeding for 15 minutes  Type of Pump:  Hospital grade and Spectra  Ameda [place] Settings http://www.[employer]/healthcare-professionals/videos/ameda-platinum-with-hygienikit-video   Speed: Start at 80 then turn down to 60 after 1.5-2 minutes when you see milk in the flange channel  Suction: To comfort, goal of  30-50%. NEVER to discomfort.   Today you were at 30   Spectra Settings  Press letdown button when first starting         Cycle: 70 / Vacuum: L1 - L 5 (To comfort)  Once milk lets down, press letdown button again  Cycle: 54 / Vacuum: L1 - L12 (To comfort)  To check the hours on the Spectra  https://[employer]/how-to-check-the-hours-on-your-spectra/  Storage (Acceptable guidelines for healthy term babies)  10 hours at room temp including your pieces, may rinse but not mandatory  8 days refrigerator, don't need  to refrigerate right away if using fresh pumped milk at the next feeding  Freezer 1 year  Deep freezer 2 years  American Academy or Pediatrics has said you may mix different temperature milks.   If baby  drinks breast milk from a fresh or refrigerated bottle of breast milk,  you may return the unused portion to the refrigerator and use once at next feeding.   Answers to FAQs: https://www.infantrisk.com/category/breastfeeding  Alcohol & Breastfeeding: What's your time-to-zero?  Cough & Cold Medications while Breastfeeding  Vitamin D Supplementation and Breastfeeding  Antidepressant Use While Breastfeeding: What should I know?  Breastfeeding, Caffeine, and Energy Drinks  Connect with other mothers:  Facebook:   [place] Breastfeeds: [email or web address]/  Well-Nourished Babies (Private group for questions and support): https://[email or web address]com/[email or web address]/  Breastfeeding [place] including opportunity to weight your baby and do before and after weights WEIGHT CHECKS  Tuesdays 10am - 11am. Women's Health at [address] [place] [address], [address] [place] [address], 3rd floor conference room  Check your baby's weight, do a feeding and see how your baby is growing, visit with other mothers, plan on a walk or coffee date after group.  Please download Growth: Baby and Child for Apple or Child Growth Tracker for [employer] if you would like to  document and follow your baby's growth curve.  Due to space limitations - limit strollers please (New c/section moms please use your stroller).  We would love to have dads stay, but moms won't breastfeed if there are men in the room, sorry.  The room is generally scheduled for another event following group.  Please take all diapers with you   ONLINE SUPPORT GROUPS  Postpartum Support International (PSI) support groups are conducted using a enar-pg-amkx support model and are not intended for those experiencing a mental health crisis. Groups are 90 minutes (1.5 hours) in length. The first ~30 minutes is providing information, education, and establishing group guidelines. The next ~60 minutes is “talk time,” in which group members share and talk with each other. Group members must be  present for the group guidelines before joining in the discussion or “talk time.”         In Conclusion:   Managing breastfeeding and milk supply is very dynamic,can be a complex and intimate journey, and is not one size fits all. When obstacles present themselves, it takes confidence, persistence and support. The rights of the child include optimal nutrition and mothers need help to make informed decisions. You  and your baby have been screened for biological, psychological, and social risk factors that might interfere with achieving your goals.  Support is critical. We want the family thriving not just tolerating life. You are now the focus of our Breastfeeding Medicine team; we are here to support your decisions and vision.     Follow up requires close monitoring in this time sensitive window of opportunity to establish milk supply and facilitate the learning of  breastfeeding.    Please call [phone] our voicemail line or the front office at [phone] to scheduled your next appointment.  Family is encouraged to e-mail or mychart us to update how the plan is working.    A total of 60 minutes, including infant assessment time,  was spent preparing to see the patient, obtaining and reviewing separately obtained history, performing a medically appropriate examination and evaluation, counseling and educating the family, referring and communicating with other health care professionals, documenting clinical information in the electronic health record, independently interpreting weighted feeds and infant growth results, communicating these results to the family and care coordination as detailed in the above note.       Babs Hartmann

## 2023-10-12 ENCOUNTER — NON-PROVIDER VISIT (OUTPATIENT)
Dept: OBGYN | Facility: CLINIC | Age: 33
End: 2023-10-12
Payer: COMMERCIAL

## 2023-10-12 NOTE — PROGRESS NOTES
Summary: Feeding an average of 6-7x every 24 hours. Breastfeeding several time during the day, then offering 2oz after the breast. If not breastfeeding, offering 4oz. Pumping 7x, yielding 4-6oz at each session.   Today: Latched to both breasts, football hold. Bebeto transferred 90mls from the right breast and 0mls from the left breast. Content to be held. Pumped for 15 minutes, yielding 80mls on the left and 120mls on the right.   Plan: Continue with 6-7 feeding every 24 hours. When latching, offer one or both breast for 10-15 minutes each side if the feeding is going well. If content, no need to offer top off bottle. If getting 6 feedings in, then offer 1-2oz after the breast and 4oz in place of breastfeeding. If getting 7 feedings, offer 1oz after the breast and 3oz in place of breastfeeding. Continue with current pump routine.     Follow up:   Lactation appointment:    Baby 's Provider appointment: 2 Month Well Child Check   Referrals: None    Subjective:     Lisa Whaley is a 33 y.o. female here for lactation care. She is here today with  partner, Rhea and baby, Bebeto .    Concerns: Weight check, Infant feeding evaluation, and Breastfeeding questions     HPI:   Pertinent  history:     Mother does not have a history of advanced maternal age, GDM, hypertension prior to pregnancy, GHTN, insulin resistance, multiple gestation, PCOS, thyroid disease, auto immune disease , placenta encapsulation, and breast surgery    Breast changes in pregnancy: Yes  History of breast surgeries: No    FEEDING HISTORY:    Previous Breastfeeding History: First baby.   Hospital Course: Exclusively .   Prior to consultation on 2023: Breastfeeding every 2-3 hours throughout the day, occasionally up to 6 hours at night. Offering both breasts for most feedings, total feeding taking an average of 30 minutes. Offering occasional replacement bottles at night, 2-3oz. Pumping  after 1-2 feedings, approximately 30-60 minutes later, yielding 20-40mls of the left and 60-80mls on the right.   Prior to consultation on 9/14/2023: Breastfeeding every 2.5-3 hours during the day, waking baby every 3.5 hours at night. Latching 4x daily then topping off with 45mls. If not breastfeeding, offering 2-2.5oz. Pumping after or in place of breastfeeding. Yielding 20-40mls on the left and 40-80mls on the right.   Prior to consultation on 9/22/2023: Feeding every 3 hours throughout the day, up to 4-6 hours at night. Latching for every other feeding during the day, topping off with 1.5-2oz. If not breastfeeding, offering 2.5-3oz. Pumping after or in place of breastfeeding, yielding 4oz for most sessions during the day and up to 6oz after long stretch at night.   Prior to consultation on 10/2/2023: Feeding every 2-3 hours throughout the day, sooner if breastfeeding and not offering top off bottle. Latching 1-2x in the morning, without a top off bottle, usually right side only. When latching in the afternoon offering 35mls after the breast. If not breastfeeding, offering 3oz. Pumping 7x every 24 hours, after or in place of breastfeeding. Averaging 24-27oz in 24 hours.   Currently 10/12/2023: Feeding an average of 6-7x every 24 hours. Breastfeeding several time during the day, then offering 2oz after the breast. If not breastfeeding, offering 4oz. Pumping 7x, yielding 4-6oz at each session.     Both breasts: Yes    Supplement: Expressed Breastmilk   Quantity: 2-4oz   How given/devices: Bottle  Bottle/nipple type: Dr. Brown, Level 1    Nipple Shield Use: None    Breast Pumping:  Frequency: 7x  Quantity Obtained: 4-6oz  Type of Pump: Spectra and Platinum   Flange size/type: 24mm  Wearable: No  NO pain with pumping    Maternal ROS:  Constitutional: No fever, chills. Feeling well  Breasts: No soreness of breasts and No soreness of nipples  Psychiatric: Managing ok  Mental Health: No mention of feeling irritable,  agitated, angry, overwhelmed, apathetic, appetite changes, exhausted nor having sleep changes outside infant feeds/demands.  Current Outpatient Medications on File Prior to Visit   Medication Sig Dispense Refill    Prenatal MV-Min-Fe Fum-FA-DHA (PRENATAL 1 PO) Take  by mouth.       No current facility-administered medications on file prior to visit.      Past Medical History:   Diagnosis Date    Dysuria 5/1/2014    No significant past medical history        Objective:     Maternal Physical Lactation Exam  General: No acute distress  Breasts: Symmetrical  and Soft  Nipples: intact  Psychiatric: Normal mood and affect. Her behavior is normal. Judgment and thought content normal.  Mental Health: Did NOT exhibit sadness, crying, feeling overwhelmed, agitation or hypervigilance.    Assessment/Plan & Lactation Counseling:     Infant Exam on Infant Chart    Infant Weight History:   08/16/2023: 8# 4.8oz  08/21/2023: 7# 12.2oz (Ped)  08/30/2023: 8# 2oz (Ped)  09/06/2023: 7# 13.1oz  09/14/2023: 8# 6.3oz   09/22/2023: 9# 1.4oz  10/02/2023: 9# 5.8oz  10/12/2023:10# 1oz    Infant Intake at Breast:   R  90mls   L  0mls     Total: 90mls  Milk Transfer at this feeding:   Effective Breastfeeding     Pumped:   Type of Pump: Hospital grade   Quantity Pumped: L 80mls    R 120mls    Total: 200mls  Initiation of Feeding: Infant initiates  Position of Feeding:    Right: football   Left: football    Attachment Achieved: rapidly  Nipple shield: N/A   Suck Pattern at the Breast: Suck burst and normal rest    Suck Pattern on the Bottle: N/A  Behavior Following Observed Feeding: Content   Nipple Pain: None     Latch: Mom latches independently  Suckling/Feeding: attaches, baby falls asleep, baby roots, elicits MATEUS, intermittent swallows, and rhythmic  Sucking strength: Moderate Strong  Sucking Rhythm Coordinated   Compression: WNL    Once latched, baby fell into a mature and fully integrated SSB pattern.    Swallowing No difficulty noted  Milk  Supply Available: normal+      MATERNAL PERSONALIZED BREASTFEEDING PLAN  Discussed concerns and symptoms as listed above in assessment and guidance summarized below. Shared decision making was used between myself and the family for this encounter, home care, and follow up. Topics reviewed included:   Feeding:   Infant growing well with feeding. Guidelines to follow:  Feed your baby every 1.5-3 hours, more often if baby acts hungry.   Awaken baby for feeding if going over 3 hours in the day.   No need to wake baby for nighttime feedings.  Daily goal: 8-10 feedings per 24 hours.   Supplement:   Supplement with expressed milk  Offer 1-2oz after the breast  Offer 3-4oz in place of breastfeeding   Pumping Guidelines:  Both breasts 7x in 24 hours.  After or in place of breastfeeding for 15 minutes  Type of Pump:  Hospital grade and Spectra  Ameda [place] Settings http://www.[employer]Us.com/healthcare-professionals/videos/ameda-platinum-with-hygienikit-video   Speed: Start at 80 then turn down to 60 after 1.5-2 minutes when you see milk in the flange channel  Suction: To comfort, goal of  30-50%. NEVER to discomfort.   Today you were at 30   Spectra Settings  Press letdown button when first starting         Cycle: 70 / Vacuum: L1 - L 5 (To comfort)  Once milk lets down, press letdown button again  Cycle: 54 / Vacuum: L1 - L12 (To comfort)  To check the hours on the Spectra  https://[employer]/how-to-check-the-hours-on-your-spectra/  Storage (Acceptable guidelines for healthy term babies)  10 hours at room temp including your pieces, may rinse but not mandatory  8 days refrigerator, don't need  to refrigerate right away if using fresh pumped milk at the next feeding  Freezer 1 year  Deep freezer 2 years  American Academy or Pediatrics has said you may mix different temperature milks.   If baby drinks breast milk from a fresh or refrigerated bottle of breast milk,  you may return the unused portion to the refrigerator  and use once at next feeding.   Answers to FAQs: https://www.infantrisk.com/category/breastfeeding  Alcohol & Breastfeeding: What's your time-to-zero?  Cough & Cold Medications while Breastfeeding  Vitamin D Supplementation and Breastfeeding  Antidepressant Use While Breastfeeding: What should I know?  Breastfeeding, Caffeine, and Energy Drinks  Connect with other mothers:  Facebook:   [place] Breastfeeds: [email or web address]  Well-Nourished Babies (Private group for questions and support): [email or web address]  Breastfeeding [place] including opportunity to weight your baby and do before and after weights WEIGHT CHECKS  Tuesdays 10am - 11am. Women's Health at [address] [place] [address], [address] [place] [address], 3rd floor conference room  Check your baby's weight, do a feeding and see how your baby is growing, visit with other mothers, plan on a walk or coffee date after group.  Please download Growth: Baby and Child for Apple or Child Growth Tracker for [employer] if you would like to  document and follow your baby's growth curve.  Due to space limitations - limit strollers please (New c/section moms please use your stroller).  We would love to have dads stay, but moms won't breastfeed if there are men in the room, sorry.  The room is generally scheduled for another event following group.  Please take all diapers with you   ONLINE SUPPORT GROUPS  Postpartum Support International (PSI) support groups are conducted using a iybz-eq-lcmi support model and are not intended for those experiencing a mental health crisis. Groups are 90 minutes (1.5 hours) in length. The first ~30 minutes is providing information, education, and establishing group guidelines. The next ~60 minutes is “talk time,” in which group members share and talk with each other. Group members must be present for the group guidelines before joining in the discussion or “talk time.”     Follow up requires close monitoring in this  time sensitive window of opportunity to establish milk supply and facilitate the learning of  breastfeeding.    Please call [phone] our voicemail line or the front office at [phone] to scheduled your next appointment.  Family is encouraged to e-mail or mychart us to update how the plan is working.    A total of 60 minutes, including infant assessment time,  was spent preparing to see the patient, obtaining and reviewing separately obtained history, performing a medically appropriate examination and evaluation, counseling and educating the family, referring and communicating with other health care professionals, documenting clinical information in the electronic health record, independently interpreting weighted feeds and infant growth results, communicating these results to the family and care coordination as detailed in the above note.       Babs Hartmann

## 2023-10-20 ENCOUNTER — HOSPITAL ENCOUNTER (OUTPATIENT)
Facility: MEDICAL CENTER | Age: 33
End: 2023-10-20
Attending: OBSTETRICS & GYNECOLOGY
Payer: COMMERCIAL

## 2023-10-20 PROCEDURE — 88175 CYTOPATH C/V AUTO FLUID REDO: CPT

## 2023-10-20 PROCEDURE — 87625 HPV TYPES 16 & 18 ONLY: CPT

## 2023-10-20 PROCEDURE — 87624 HPV HI-RISK TYP POOLED RSLT: CPT

## 2023-10-26 ENCOUNTER — NON-PROVIDER VISIT (OUTPATIENT)
Dept: OBGYN | Facility: CLINIC | Age: 33
End: 2023-10-26
Payer: COMMERCIAL

## 2023-10-26 LAB
CYTOLOGIST CVX/VAG CYTO: ABNORMAL
CYTOLOGY CVX/VAG DOC CYTO: ABNORMAL
CYTOLOGY CVX/VAG DOC THIN PREP: ABNORMAL
HPV I/H RISK 4 DNA CVX QL PROBE+SIG AMP: POSITIVE
HPV16 DNA CVX QL PROBE+SIG AMP: NEGATIVE
HPV18+45 E6+E7 MRNA CVX QL NAA+PROBE: NEGATIVE
NOTE NL11727A: ABNORMAL
OTHER STN SPEC: ABNORMAL
STAT OF ADQ CVX/VAG CYTO-IMP: ABNORMAL

## 2023-10-26 NOTE — PROGRESS NOTES
Summary: Continues to feed 6-7x every 24 hours with long stretch of sleep at night. Breastfeeding 2x in the morning and not offering a top off bottle. Breastfeeding 1-2x in the afternoon then offering 2oz after the breast. If not breastfeeding, offering 4oz. Pumping 6-7x every 24 hours.  Today: Latched to both breasts, football hold. Bebeto transferred 100mls from the right breast and 14mls from the left breast. Content to be held.   Plan: Continue with 6-7 feeding every 24 hours. When latching, offer one or both breast for 10-15 minutes each side if the feeding is going well. If content, no need to offer top off bottle. Offer 2-3oz after the breast and 4oz in place of breastfeeding. Continue with current pump routine.     Follow up:   Lactation appointment:  2023  Baby 's Provider appointment: 2 Month Well Child Check   Referrals: None    Subjective:     Lisa Whaley is a 33 y.o. female here for lactation care. She is here today with  partner, Rhea and baby, Bebeto .    Concerns: Weight check, Infant feeding evaluation, and Breastfeeding questions     HPI:   Pertinent  history:     Mother does not have a history of advanced maternal age, GDM, hypertension prior to pregnancy, GHTN, insulin resistance, multiple gestation, PCOS, thyroid disease, auto immune disease , placenta encapsulation, and breast surgery    Breast changes in pregnancy: Yes  History of breast surgeries: No    FEEDING HISTORY:    Previous Breastfeeding History: First baby.   Hospital Course: Exclusively .   Prior to consultation on 2023: Breastfeeding every 2-3 hours throughout the day, occasionally up to 6 hours at night. Offering both breasts for most feedings, total feeding taking an average of 30 minutes. Offering occasional replacement bottles at night, 2-3oz. Pumping after 1-2 feedings, approximately 30-60 minutes later, yielding 20-40mls of the left and 60-80mls on the right.   Prior to  consultation on 9/14/2023: Breastfeeding every 2.5-3 hours during the day, waking baby every 3.5 hours at night. Latching 4x daily then topping off with 45mls. If not breastfeeding, offering 2-2.5oz. Pumping after or in place of breastfeeding. Yielding 20-40mls on the left and 40-80mls on the right.   Prior to consultation on 9/22/2023: Feeding every 3 hours throughout the day, up to 4-6 hours at night. Latching for every other feeding during the day, topping off with 1.5-2oz. If not breastfeeding, offering 2.5-3oz. Pumping after or in place of breastfeeding, yielding 4oz for most sessions during the day and up to 6oz after long stretch at night.   Prior to consultation on 10/2/2023: Feeding every 2-3 hours throughout the day, sooner if breastfeeding and not offering top off bottle. Latching 1-2x in the morning, without a top off bottle, usually right side only. When latching in the afternoon offering 35mls after the breast. If not breastfeeding, offering 3oz. Pumping 7x every 24 hours, after or in place of breastfeeding. Averaging 24-27oz in 24 hours.   Prior to consultation on 10/12/2023: Feeding an average of 6-7x every 24 hours. Breastfeeding several time during the day, then offering 2oz after the breast. If not breastfeeding, offering 4oz. Pumping 7x, yielding 4-6oz at each session.   Currently 10/26/2023: Continues to feed 6-7x every 24 hours with long stretch of sleep at night. Breastfeeding 2x in the morning and not offering a top off bottle. Breastfeeding 1-2x in the afternoon then offering 2oz after the breast. If not breastfeeding, offering 4oz. Pumping 6-7x every 24 hours.    Both breasts: Yes    Supplement: Expressed Breastmilk   Quantity: 2-4oz   How given/devices: Bottle  Bottle/nipple type: Dr. Brown, Level 1    Nipple Shield Use: None    Breast Pumping:  Frequency: 7x  Quantity Obtained: 4-6oz  Type of Pump: Spectra and Platinum   Flange size/type: 24mm  Wearable: No  NO pain with  pumping    Maternal ROS:  Constitutional: No fever, chills. Feeling well  Breasts: No soreness of breasts and No soreness of nipples  Psychiatric: Managing ok  Mental Health: No mention of feeling irritable, agitated, angry, overwhelmed, apathetic, appetite changes, exhausted nor having sleep changes outside infant feeds/demands.  Current Outpatient Medications on File Prior to Visit   Medication Sig Dispense Refill    Prenatal MV-Min-Fe Fum-FA-DHA (PRENATAL 1 PO) Take  by mouth.       No current facility-administered medications on file prior to visit.      Past Medical History:   Diagnosis Date    Dysuria 5/1/2014    No significant past medical history        Objective:     Maternal Physical Lactation Exam  General: No acute distress  Breasts: Symmetrical  and Soft  Nipples: intact  Psychiatric: Normal mood and affect. Her behavior is normal. Judgment and thought content normal.  Mental Health: Did NOT exhibit sadness, crying, feeling overwhelmed, agitation or hypervigilance.    Assessment/Plan & Lactation Counseling:     Infant Exam on Infant Chart    Infant Weight History:   08/16/2023: 8# 4.8oz  08/21/2023: 7# 12.2oz (Ped)  08/30/2023: 8# 2oz (Ped)  09/06/2023: 7# 13.1oz  09/14/2023: 8# 6.3oz   09/22/2023: 9# 1.4oz  10/02/2023: 9# 5.8oz  10/12/2023:10# 1oz  10/26/2023: 10# 12.8oz    Infant Intake at Breast:   R  100mls   L  14mls     Total: 114mls  Milk Transfer at this feeding:   Effective Breastfeeding     Pumped: N/A  Initiation of Feeding: Infant initiates  Position of Feeding:    Right: football   Left: football    Attachment Achieved: rapidly  Nipple shield: N/A   Suck Pattern at the Breast: Suck burst and normal rest    Suck Pattern on the Bottle: N/A  Behavior Following Observed Feeding: Content   Nipple Pain: None     Latch: Mom latches independently  Suckling/Feeding: attaches, baby falls asleep, baby roots, elicits MATEUS, intermittent swallows, and rhythmic  Sucking strength: Moderate Strong  Sucking  Rhythm Coordinated   Compression: WNL    Once latched, baby fell into a mature and fully integrated SSB pattern.    Swallowing No difficulty noted  Milk Supply Available: normal+      MATERNAL PERSONALIZED BREASTFEEDING PLAN  Discussed concerns and symptoms as listed above in assessment and guidance summarized below. Shared decision making was used between myself and the family for this encounter, home care, and follow up. Topics reviewed included:   Feeding:   Infant growing well with feeding. Guidelines to follow:  Continue with 6-7 feedings every 24 hours  Daily goal: 8-10 feedings per 24 hours.   Supplement:   Supplement with expressed milk  Offer 2-3oz after the breast  Offer 4oz in place of breastfeeding   Pumping Guidelines:  Both breasts 7x in 24 hours.  After or in place of breastfeeding for 15 minutes  Type of Pump:  Hospital grade and Spectra  Ameda Gobler Settings http://www.[employer]/healthcare-professionals/videos/ameda-platinum-with-hygienikit-video   Speed: Start at 80 then turn down to 60 after 1.5-2 minutes when you see milk in the flange channel  Suction: To comfort, goal of  30-50%. NEVER to discomfort.   Today you were at 30   Spectra Settings  Press letdown button when first starting         Cycle: 70 / Vacuum: L1 - L 5 (To comfort)  Once milk lets down, press letdown button again  Cycle: 54 / Vacuum: L1 - L12 (To comfort)  To check the hours on the Spectra  https://Adhesive.[email or web address]/how-to-check-the-hours-on-your-spectra/  Storage (Acceptable guidelines for healthy term babies)  10 hours at room temp including your pieces, may rinse but not mandatory  8 days refrigerator, don't need  to refrigerate right away if using fresh pumped milk at the next feeding  Freezer 1 year  Deep freezer 2 years  American Academy or Pediatrics has said you may mix different temperature milks.   If baby drinks breast milk from a fresh or refrigerated bottle of breast milk,  you may return the unused portion  to the refrigerator and use once at next feeding.   Answers to FAQs: https://www.infantrisk.com/category/breastfeeding  Alcohol & Breastfeeding: What's your time-to-zero?  Cough & Cold Medications while Breastfeeding  Vitamin D Supplementation and Breastfeeding  Antidepressant Use While Breastfeeding: What should I know?  Breastfeeding, Caffeine, and Energy Drinks  Connect with other mothers:  Facebook:   [place] Breastfeeds: [email or web address].[email or web address]  Well-Nourished Babies (Private group for questions and support): [email or web address]/[email or web address]  Breastfeeding [place] including opportunity to weight your baby and do before and after weights WEIGHT CHECKS  Tuesdays 10am - 11am. Women's Health at [address] [place] [address], [address] [place] [address], 3rd floor conference room  Check your baby's weight, do a feeding and see how your baby is growing, visit with other mothers, plan on a walk or coffee date after group.  Please download Growth: Baby and Child for Apple or Child Growth Tracker for [employer] if you would like to  document and follow your baby's growth curve.  Due to space limitations - limit strollers please (New c/section moms please use your stroller).  We would love to have dads stay, but moms won't breastfeed if there are men in the room, sorry.  The room is generally scheduled for another event following group.  Please take all diapers with you   ONLINE SUPPORT GROUPS  Postpartum Support International (PSI) support groups are conducted using a knhy-dt-kafi support model and are not intended for those experiencing a mental health crisis. Groups are 90 minutes (1.5 hours) in length. The first ~30 minutes is providing information, education, and establishing group guidelines. The next ~60 minutes is “talk time,” in which group members share and talk with each other. Group members must be present for the group guidelines before joining in the discussion or “talk time.”     Follow up requires  close monitoring in this time sensitive window of opportunity to establish milk supply and facilitate the learning of  breastfeeding.    Please call [phone] our voicemail line or the front office at [phone] to scheduled your next appointment.  Family is encouraged to e-mail or mychart us to update how the plan is working.    A total of 60 minutes, including infant assessment time,  was spent preparing to see the patient, obtaining and reviewing separately obtained history, performing a medically appropriate examination and evaluation, counseling and educating the family, referring and communicating with other health care professionals, documenting clinical information in the electronic health record, independently interpreting weighted feeds and infant growth results, communicating these results to the family and care coordination as detailed in the above note.       Babs Hartmann

## 2023-11-06 ENCOUNTER — NON-PROVIDER VISIT (OUTPATIENT)
Dept: OBGYN | Facility: CLINIC | Age: 33
End: 2023-11-06
Payer: COMMERCIAL

## 2023-11-06 NOTE — PROGRESS NOTES
Summary: Lisa continues to feed 6-7x every 24 hours with long stretch of sleep at night. Breastfeeding 2x in the morning and not offering a top off bottle. Breastfeeding 1-2x in the afternoon then offering 2oz after the breast. If not breastfeeding, offering 4-5oz. Pumping 6-7x every 24 hours.  Today: Latched to the right breast, football hold. Bebeto transferred 160mls in 15 minutes. Small spit up. Content to be held.   Plan: Continue with 6-7 feeding every 24 hours. When latching, offer one or both breast for 10-15 minutes each side if the feeding is going well. If content, no need to offer top off bottle. Offer 2-3oz after the breast if needed and 4-5oz in place of breastfeeding. Continue with current pump routine. When working offer 4, 4-5oz  bottles.     Follow up:   Lactation appointment:  2023  Baby 's Provider appointment: 4 Month Well Child Check   Referrals: None    Subjective:     Lisa Whaley is a 33 y.o. female here for lactation care. She is here today with  partner, Rhea and baby, Bebeto .    Concerns: Weight check, Infant feeding evaluation, and Breastfeeding questions     HPI:   Pertinent  history:     Mother does not have a history of advanced maternal age, GDM, hypertension prior to pregnancy, GHTN, insulin resistance, multiple gestation, PCOS, thyroid disease, auto immune disease , placenta encapsulation, and breast surgery    Breast changes in pregnancy: Yes  History of breast surgeries: No    FEEDING HISTORY:    Previous Breastfeeding History: First baby.   Hospital Course: Exclusively .   Prior to consultation on 2023: Breastfeeding every 2-3 hours throughout the day, occasionally up to 6 hours at night. Offering both breasts for most feedings, total feeding taking an average of 30 minutes. Offering occasional replacement bottles at night, 2-3oz. Pumping after 1-2 feedings, approximately 30-60 minutes later, yielding 20-40mls of the left  and 60-80mls on the right.   Prior to consultation on 9/14/2023: Breastfeeding every 2.5-3 hours during the day, waking baby every 3.5 hours at night. Latching 4x daily then topping off with 45mls. If not breastfeeding, offering 2-2.5oz. Pumping after or in place of breastfeeding. Yielding 20-40mls on the left and 40-80mls on the right.   Prior to consultation on 9/22/2023: Feeding every 3 hours throughout the day, up to 4-6 hours at night. Latching for every other feeding during the day, topping off with 1.5-2oz. If not breastfeeding, offering 2.5-3oz. Pumping after or in place of breastfeeding, yielding 4oz for most sessions during the day and up to 6oz after long stretch at night.   Prior to consultation on 10/2/2023: Feeding every 2-3 hours throughout the day, sooner if breastfeeding and not offering top off bottle. Latching 1-2x in the morning, without a top off bottle, usually right side only. When latching in the afternoon offering 35mls after the breast. If not breastfeeding, offering 3oz. Pumping 7x every 24 hours, after or in place of breastfeeding. Averaging 24-27oz in 24 hours.   Prior to consultation on 10/12/2023: Feeding an average of 6-7x every 24 hours. Breastfeeding several time during the day, then offering 2oz after the breast. If not breastfeeding, offering 4oz. Pumping 7x, yielding 4-6oz at each session.   Prior to consultation on 10/26/2023: Continues to feed 6-7x every 24 hours with long stretch of sleep at night. Breastfeeding 2x in the morning and not offering a top off bottle. Breastfeeding 1-2x in the afternoon then offering 2oz after the breast. If not breastfeeding, offering 4oz. Pumping 6-7x every 24 hours.  Currently 11/6/2023: Continues to feed 6-7x every 24 hours with long stretch of sleep at night. Breastfeeding 2x in the morning and not offering a top off bottle. Breastfeeding 1-2x in the afternoon then offering 2oz after the breast. If not breastfeeding, offering 4-5oz. Pumping  6-7x every 24 hours.    Both breasts: Yes    Supplement: Expressed Breastmilk   Quantity: 1-5oz   How given/devices: Bottle  Bottle/nipple type: Dr. Brown, Level 1    Nipple Shield Use: None    Breast Pumping:  Frequency: 6-7x  Quantity Obtained: 6-8oz  Type of Pump: Spectra, Goldie Stride and Platinum   Flange size/type: 24mm  Wearable: Yes  NO pain with pumping    Maternal ROS:  Constitutional: No fever, chills. Feeling well  Breasts: No soreness of breasts and No soreness of nipples  Psychiatric: Managing ok  Mental Health: No mention of feeling irritable, agitated, angry, overwhelmed, apathetic, appetite changes, exhausted nor having sleep changes outside infant feeds/demands.  Current Outpatient Medications on File Prior to Visit   Medication Sig Dispense Refill    Prenatal MV-Min-Fe Fum-FA-DHA (PRENATAL 1 PO) Take  by mouth.       No current facility-administered medications on file prior to visit.      Past Medical History:   Diagnosis Date    Dysuria 5/1/2014    No significant past medical history        Objective:     Maternal Physical Lactation Exam  General: No acute distress  Breasts: Symmetrical  and Soft  Nipples: intact  Psychiatric: Normal mood and affect. Her behavior is normal. Judgment and thought content normal.  Mental Health: Did NOT exhibit sadness, crying, feeling overwhelmed, agitation or hypervigilance.    Assessment/Plan & Lactation Counseling:     Infant Exam on Infant Chart    Infant Weight History:   08/16/2023: 8# 4.8oz  08/21/2023: 7# 12.2oz (Ped)  08/30/2023: 8# 2oz (Ped)  09/06/2023: 7# 13.1oz  09/14/2023: 8# 6.3oz   09/22/2023: 9# 1.4oz  10/02/2023: 9# 5.8oz  10/12/2023:10# 1oz  10/26/2023: 10# 12.8oz  11/06/2023: 11# 10oz    Infant Intake at Breast:   R  160mls   L  Not Offered     Total: 160mls/5.3oz  Milk Transfer at this feeding:   Effective Breastfeeding     Pumped: N/A  Initiation of Feeding: Infant initiates  Position of Feeding:    Right: football   Left: not offered    Attachment Achieved: rapidly  Nipple shield: N/A   Suck Pattern at the Breast: Suck burst and normal rest    Suck Pattern on the Bottle: N/A  Behavior Following Observed Feeding: Content   Nipple Pain: None     Latch: Mom latches independently  Suckling/Feeding: attaches, baby falls asleep, baby roots, elicits MATEUS, intermittent swallows, and rhythmic  Sucking strength: Moderate Strong  Sucking Rhythm Coordinated   Compression: WNL    Once latched, baby fell into a mature and fully integrated SSB pattern.    Swallowing No difficulty noted  Milk Supply Available: normal+      MATERNAL PERSONALIZED BREASTFEEDING PLAN  Discussed concerns and symptoms as listed above in assessment and guidance summarized below. Shared decision making was used between myself and the family for this encounter, home care, and follow up. Topics reviewed included:   Feeding:   Infant growing well with feeding. Guidelines to follow:  Continue with 6-7 feedings every 24 hours  Daily goal: 8-10 feedings per 24 hours.   Supplement:   Supplement with expressed milk  Offer 1-2oz after the breast as needed  Offer 4-5oz in place of breastfeeding   Pumping Guidelines:  Both breasts 7x in 24 hours.  After or in place of breastfeeding for 15 minutes  Type of Pump:  Hospital grade and Spectra  [employer] [place] Settings http://www.[employer]/healthcare-professionals/videos/ameda-platinum-with-hygienikit-video   Speed: Start at 80 then turn down to 60 after 1.5-2 minutes when you see milk in the flange channel  Suction: To comfort, goal of  30-50%. NEVER to discomfort.   Today you were at 30   Spectra Settings  Press letdown button when first starting         Cycle: 70 / Vacuum: L1 - L 5 (To comfort)  Once milk lets down, press letdown button again  Cycle: 54 / Vacuum: L1 - L12 (To comfort)  To check the hours on the Spectra  https://[employer]/how-to-check-the-hours-on-your-spectra/  Storage (Acceptable guidelines for healthy term babies)  10  hours at room temp including your pieces, may rinse but not mandatory  8 days refrigerator, don't need  to refrigerate right away if using fresh pumped milk at the next feeding  Freezer 1 year  Deep freezer 2 years  American Academy or Pediatrics has said you may mix different temperature milks.   If baby drinks breast milk from a fresh or refrigerated bottle of breast milk,  you may return the unused portion to the refrigerator and use once at next feeding.   Answers to FAQs: https://www.infantrisk.com/category/breastfeeding  Alcohol & Breastfeeding: What's your time-to-zero?  Cough & Cold Medications while Breastfeeding  Vitamin D Supplementation and Breastfeeding  Antidepressant Use While Breastfeeding: What should I know?  Breastfeeding, Caffeine, and Energy Drinks  Connect with other mothers:  Facebook:   [place] Breastfeeds: [email or web address]/[place].breastfeeds/  Well-Nourished Babies (Private group for questions and support): https://www.NovaSys.com/groups/349758558298980/  Breastfeeding [place] including opportunity to weight your baby and do before and after weights WEIGHT CHECKS  Tuesdays 10am - 11am. Women's Health at [address] [place] [address], [facility] [address] [place] [address], 3rd floor conference room  Check your baby's weight, do a feeding and see how your baby is growing, visit with other mothers, plan on a walk or coffee date after group.  Please download Growth: Baby and Child for Apple or Child Growth Tracker for [employer] if you would like to  document and follow your baby's growth curve.  Due to space limitations - limit strollers please (New c/section moms please use your stroller).  We would love to have dads stay, but moms won't breastfeed if there are men in the room, sorry.  The room is generally scheduled for another event following group.  Please take all diapers with you   ONLINE SUPPORT GROUPS  Postpartum Support International (PSI) support groups are conducted using a ldwx-ud-xukz support model and are not  intended for those experiencing a mental health crisis. Groups are 90 minutes (1.5 hours) in length. The first ~30 minutes is providing information, education, and establishing group guidelines. The next ~60 minutes is “talk time,” in which group members share and talk with each other. Group members must be present for the group guidelines before joining in the discussion or “talk time.”     Follow up requires close monitoring in this time sensitive window of opportunity to establish milk supply and facilitate the learning of  breastfeeding.    Please call [phone] our voicemail line or the front office at [phone] to scheduled your next appointment.  Family is encouraged to e-mail or mychart us to update how the plan is working.    A total of 60 minutes, including infant assessment time,  was spent preparing to see the patient, obtaining and reviewing separately obtained history, performing a medically appropriate examination and evaluation, counseling and educating the family, referring and communicating with other health care professionals, documenting clinical information in the electronic health record, independently interpreting weighted feeds and infant growth results, communicating these results to the family and care coordination as detailed in the above note.       Babs Hartmann

## 2023-11-13 ENCOUNTER — NON-PROVIDER VISIT (OUTPATIENT)
Dept: OBGYN | Facility: CLINIC | Age: 33
End: 2023-11-13
Payer: COMMERCIAL

## 2023-11-13 NOTE — PROGRESS NOTES
Summary: Lisa returned to work last week. Pumped 3x at work and 2-3x at home, making plenty of milk. Reports difficulty latching once home from work, Bebeto will cry until he is given the bottle. Offering 4oz at each feeding during the day.   Today: Latched to the right breast, football hold with difficulty. After several minutes a bottle was offered to settle Bebeto, taking 2oz of the 4oz offered. Bebeto transferred 68mls.   Plan: Continue with 6-7 feedings every 24 hours. When latching, offer one or both breast for 10-15 minutes each side if the feeding is going well. If content, no need to offer top off bottle. Offer 1-2oz after the breast if needed and 5oz in place of breastfeeding. Continue with current pump routine. When working offer 4, 5oz  bottles.     Follow up:   Lactation appointment:  2023  Baby 's Provider appointment: 4 Month Well Child Check   Referrals: None    Subjective:     Lisa Whaley is a 33 y.o. female here for lactation care. She is here today with  partner, Rhea and baby, Bebeto .    Concerns: Weight check, Infant feeding evaluation, and Breastfeeding questions     HPI:   Pertinent  history:     Mother does not have a history of advanced maternal age, GDM, hypertension prior to pregnancy, GHTN, insulin resistance, multiple gestation, PCOS, thyroid disease, auto immune disease , placenta encapsulation, and breast surgery    Breast changes in pregnancy: Yes  History of breast surgeries: No    FEEDING HISTORY:    Previous Breastfeeding History: First baby.   Hospital Course: Exclusively .   Prior to consultation on 2023: Breastfeeding every 2-3 hours throughout the day, occasionally up to 6 hours at night. Offering both breasts for most feedings, total feeding taking an average of 30 minutes. Offering occasional replacement bottles at night, 2-3oz. Pumping after 1-2 feedings, approximately 30-60 minutes later, yielding 20-40mls of the left  and 60-80mls on the right.   Prior to consultation on 9/14/2023: Breastfeeding every 2.5-3 hours during the day, waking baby every 3.5 hours at night. Latching 4x daily then topping off with 45mls. If not breastfeeding, offering 2-2.5oz. Pumping after or in place of breastfeeding. Yielding 20-40mls on the left and 40-80mls on the right.   Prior to consultation on 9/22/2023: Feeding every 3 hours throughout the day, up to 4-6 hours at night. Latching for every other feeding during the day, topping off with 1.5-2oz. If not breastfeeding, offering 2.5-3oz. Pumping after or in place of breastfeeding, yielding 4oz for most sessions during the day and up to 6oz after long stretch at night.   Prior to consultation on 10/2/2023: Feeding every 2-3 hours throughout the day, sooner if breastfeeding and not offering top off bottle. Latching 1-2x in the morning, without a top off bottle, usually right side only. When latching in the afternoon offering 35mls after the breast. If not breastfeeding, offering 3oz. Pumping 7x every 24 hours, after or in place of breastfeeding. Averaging 24-27oz in 24 hours.   Prior to consultation on 10/12/2023: Feeding an average of 6-7x every 24 hours. Breastfeeding several time during the day, then offering 2oz after the breast. If not breastfeeding, offering 4oz. Pumping 7x, yielding 4-6oz at each session.   Prior to consultation on 10/26/2023: Continues to feed 6-7x every 24 hours with long stretch of sleep at night. Breastfeeding 2x in the morning and not offering a top off bottle. Breastfeeding 1-2x in the afternoon then offering 2oz after the breast. If not breastfeeding, offering 4oz. Pumping 6-7x every 24 hours.  Prior to consultation on 11/6/2023: Continues to feed 6-7x every 24 hours with long stretch of sleep at night. Breastfeeding 2x in the morning and not offering a top off bottle. Breastfeeding 1-2x in the afternoon then offering 2oz after the breast. If not breastfeeding, offering  4-5oz. Pumping 6-7x every 24 hours.  Currently 11/13/2023: Lisa returned to work last week. Pumped 3x at work and 2-3x at home, making plenty of milk. Reports difficulty latching once home from work, Bebeto will cry until he is given the bottle. Offering 4oz at each feeding during the day.     Both breasts: Yes    Supplement: Expressed Breastmilk   Quantity: 1-5oz   How given/devices: Bottle  Bottle/nipple type: Dr. Brown, Level 1    Nipple Shield Use: None    Breast Pumping:  Frequency: 5-6x  Quantity Obtained: 6-10oz  Type of Pump: Spectra, Goldie Stride and Platinum   Flange size/type: 24mm  Wearable: Yes  NO pain with pumping    Maternal ROS:  Constitutional: No fever, chills. Feeling well  Breasts: No soreness of breasts and No soreness of nipples  Psychiatric: Managing ok  Mental Health: No mention of feeling irritable, agitated, angry, overwhelmed, apathetic, appetite changes, exhausted nor having sleep changes outside infant feeds/demands.  Current Outpatient Medications on File Prior to Visit   Medication Sig Dispense Refill    Prenatal MV-Min-Fe Fum-FA-DHA (PRENATAL 1 PO) Take  by mouth.       No current facility-administered medications on file prior to visit.      Past Medical History:   Diagnosis Date    Dysuria 5/1/2014    No significant past medical history        Objective:     Maternal Physical Lactation Exam  General: No acute distress  Breasts: Symmetrical  and Soft  Nipples: intact  Psychiatric: Normal mood and affect. Her behavior is normal. Judgment and thought content normal.  Mental Health: Did NOT exhibit sadness, crying, feeling overwhelmed, agitation or hypervigilance.    Assessment/Plan & Lactation Counseling:     Infant Exam on Infant Chart    Infant Weight History:   08/16/2023: 8# 4.8oz  08/21/2023: 7# 12.2oz (Ped)  08/30/2023: 8# 2oz (Ped)  09/06/2023: 7# 13.1oz  09/14/2023: 8# 6.3oz   09/22/2023: 9# 1.4oz  10/02/2023: 9# 5.8oz  10/12/2023:10# 1oz  10/26/2023: 10#  12.8oz  11/06/2023: 11# 10oz  11/13/2023: 11# 11.1oz    Infant Intake at Breast:   R  68mls   L  Not Offered     Total: 68mls  Milk Transfer at this feeding:   Ineffective Breastfeeding     Pumped: N/A  Initiation of Feeding: Infant initiates  Position of Feeding:    Right: football   Left: not offered   Attachment Achieved: rapidly  Nipple shield: N/A   Suck Pattern at the Breast: Suck burst and normal rest    Suck Pattern on the Bottle: Suck burst and normal rest  Behavior Following Observed Feeding: Content   Nipple Pain: None     Latch: Mom latches independently  Suckling/Feeding: attaches, baby falls asleep, baby roots, elicits MATEUS, intermittent swallows, and rhythmic  Sucking strength: Moderate Strong  Sucking Rhythm Coordinated   Compression: WNL    Once latched, baby fell into a mature and fully integrated SSB pattern.    Swallowing No difficulty noted  Milk Supply Available: normal+      MATERNAL PERSONALIZED BREASTFEEDING PLAN  Discussed concerns and symptoms as listed above in assessment and guidance summarized below. Shared decision making was used between myself and the family for this encounter, home care, and follow up. Topics reviewed included:   Feeding:   Infant growing well with feeding. Guidelines to follow:  Continue with 6-7 feedings every 24 hours  Daily goal: 8-10 feedings per 24 hours.   Supplement:   Supplement with expressed milk  Offer 1-2oz after the breast as needed  Offer 5oz in place of breastfeeding   Pumping Guidelines:  Both breasts 5-7x in 24 hours.  After or in place of breastfeeding for 15 minutes  Type of Pump:  Hospital grade and Spectra  Ameda [place] Settings http://[email or web address].com/healthcare-professionals/videos/ameda-platinum-with-hygienikit-video   Speed: Start at 80 then turn down to 60 after 1.5-2 minutes when you see milk in the flange channel  Suction: To comfort, goal of  30-50%. NEVER to discomfort.   Today you were at 30   Spectra Settings  Press letdown button when  first starting         Cycle: 70 / Vacuum: L1 - L 5 (To comfort)  Once milk lets down, press letdown button again  Cycle: 54 / Vacuum: L1 - L12 (To comfort)  To check the hours on the Spectra  https://In[employer]/how-to-check-the-hours-on-your-spectra/  Storage (Acceptable guidelines for healthy term babies)  10 hours at room temp including your pieces, may rinse but not mandatory  8 days refrigerator, don't need  to refrigerate right away if using fresh pumped milk at the next feeding  Freezer 1 year  Deep freezer 2 years  American Academy or Pediatrics has said you may mix different temperature milks.   If baby drinks breast milk from a fresh or refrigerated bottle of breast milk,  you may return the unused portion to the refrigerator and use once at next feeding.   Answers to FAQs: https://www.infantGradient X.Silent Power/category/breastfeeding  Alcohol & Breastfeeding: What's your time-to-zero?  Cough & Cold Medications while Breastfeeding  Vitamin D Supplementation and Breastfeeding  Antidepressant Use While Breastfeeding: What should I know?  Breastfeeding, Caffeine, and Energy Drinks  Connect with other mothers:  Facebook:   [place] Breastfeeds: https://www.[employer]ent [employer]/[place].breastfeeds/  Well-Nourished Babies (Private group for questions and support): https://www.facebook.com/groups/557924170988210/  Breastfeeding [place] including opportunity to weight your baby and do before and after weights WEIGHT CHECKS  Tuesdays 10am - 11am. Women's Health at [address] [place] [address], [facility] [address] [place] [address], 3rd floor conference room  Check your baby's weight, do a feeding and see how your baby is growing, visit with other mothers, plan on a walk or coffee date after group.  Please download Growth: Baby and Child for Apple or Child Growth Tracker for FRESS if you would like to  document and follow your baby's growth curve.  Due to space limitations - limit strollers please (New c/section moms please use your stroller).  We would love to  have dads stay, but moms won't breastfeed if there are men in the room, sorry.  The room is generally scheduled for another event following group.  Please take all diapers with you   ONLINE SUPPORT GROUPS  Postpartum Support International (PSI) support groups are conducted using a nqcj-hj-rbry support model and are not intended for those experiencing a mental health crisis. Groups are 90 minutes (1.5 hours) in length. The first ~30 minutes is providing information, education, and establishing group guidelines. The next ~60 minutes is “talk time,” in which group members share and talk with each other. Group members must be present for the group guidelines before joining in the discussion or “talk time.”     Follow up requires close monitoring in this time sensitive window of opportunity to establish milk supply and facilitate the learning of  breastfeeding.    Please call [phone] our voicemail line or the front office at [phone] to scheduled your next appointment.  Family is encouraged to e-mail or mychart us to update how the plan is working.    A total of 60 minutes, including infant assessment time,  was spent preparing to see the patient, obtaining and reviewing separately obtained history, performing a medically appropriate examination and evaluation, counseling and educating the family, referring and communicating with other health care professionals, documenting clinical information in the electronic health record, independently interpreting weighted feeds and infant growth results, communicating these results to the family and care coordination as detailed in the above note.       Babs Hartmann

## 2023-11-27 ENCOUNTER — NON-PROVIDER VISIT (OUTPATIENT)
Dept: OBGYN | Facility: CLINIC | Age: 33
End: 2023-11-27
Payer: COMMERCIAL

## 2023-11-27 NOTE — PROGRESS NOTES
Summary: Pumping 3x at work and 2x at home when working, making enough milk for 3 days of work in 1 day. When home, mostly breastfeeding, offering both sides. Pumping after first and last feeding and sometimes one midday. Not topping off after breastfeeding. If not breastfeeding, offering 4-5oz.   Today: Latched to the right breast, brenda mora, Bebeto transferred 115mls in 10 minutes. Content to be held.   Plan: Continue with 6-7 feedings every 24 hours. When latching, offer one or both breast for 10-15 minutes each side if the feeding is going well. No need to offer top off bottle. Continue to offer 4-5oz when not breastfeeding. When home and primarily breastfeeding, pump after first and last feeding of the day and in place of breastfeeding if giving a bottle. Will experiment with pumping 2x at work instead of 3x.     Follow up:   Lactation appointment:  January 3, 2024  Baby 's Provider appointment: 4 Month Well Child Check   Referrals: None    Subjective:     Lisa Whaley is a 33 y.o. female here for lactation care. She is here today with  partner, Rhea and babyBebeto .    Concerns: Weight check, Infant feeding evaluation, and Breastfeeding questions     HPI:   Pertinent  history:     Mother does not have a history of advanced maternal age, GDM, hypertension prior to pregnancy, GHTN, insulin resistance, multiple gestation, PCOS, thyroid disease, auto immune disease , placenta encapsulation, and breast surgery    Breast changes in pregnancy: Yes  History of breast surgeries: No    FEEDING HISTORY:    Previous Breastfeeding History: First baby.   Hospital Course: Exclusively .   Prior to consultation on 2023: Breastfeeding every 2-3 hours throughout the day, occasionally up to 6 hours at night. Offering both breasts for most feedings, total feeding taking an average of 30 minutes. Offering occasional replacement bottles at night, 2-3oz. Pumping after 1-2 feedings,  approximately 30-60 minutes later, yielding 20-40mls of the left and 60-80mls on the right.   Prior to consultation on 9/14/2023: Breastfeeding every 2.5-3 hours during the day, waking baby every 3.5 hours at night. Latching 4x daily then topping off with 45mls. If not breastfeeding, offering 2-2.5oz. Pumping after or in place of breastfeeding. Yielding 20-40mls on the left and 40-80mls on the right.   Prior to consultation on 9/22/2023: Feeding every 3 hours throughout the day, up to 4-6 hours at night. Latching for every other feeding during the day, topping off with 1.5-2oz. If not breastfeeding, offering 2.5-3oz. Pumping after or in place of breastfeeding, yielding 4oz for most sessions during the day and up to 6oz after long stretch at night.   Prior to consultation on 10/2/2023: Feeding every 2-3 hours throughout the day, sooner if breastfeeding and not offering top off bottle. Latching 1-2x in the morning, without a top off bottle, usually right side only. When latching in the afternoon offering 35mls after the breast. If not breastfeeding, offering 3oz. Pumping 7x every 24 hours, after or in place of breastfeeding. Averaging 24-27oz in 24 hours.   Prior to consultation on 10/12/2023: Feeding an average of 6-7x every 24 hours. Breastfeeding several time during the day, then offering 2oz after the breast. If not breastfeeding, offering 4oz. Pumping 7x, yielding 4-6oz at each session.   Prior to consultation on 10/26/2023: Continues to feed 6-7x every 24 hours with long stretch of sleep at night. Breastfeeding 2x in the morning and not offering a top off bottle. Breastfeeding 1-2x in the afternoon then offering 2oz after the breast. If not breastfeeding, offering 4oz. Pumping 6-7x every 24 hours.  Prior to consultation on 11/6/2023: Continues to feed 6-7x every 24 hours with long stretch of sleep at night. Breastfeeding 2x in the morning and not offering a top off bottle. Breastfeeding 1-2x in the afternoon  then offering 2oz after the breast. If not breastfeeding, offering 4-5oz. Pumping 6-7x every 24 hours.  Prior to consultation on 11/13/2023: Lisa returned to work last week. Pumped 3x at work and 2-3x at home, making plenty of milk. Reports difficulty latching once home from work, Bebeto will cry until he is given the bottle. Offering 4oz at each feeding during the day.   Currently 11/27/2023: Pumping 3x at work and 2x at home when working, making enough milk for 3 days of work in 1 day. When home, mostly breastfeeding, offering both sides. Pumping after first and last feeding and sometimes one midday. Not topping off after breastfeeding. If not breastfeeding, offering 4-5oz.     Both breasts: Yes    Supplement: Expressed Breastmilk   Quantity: 1-5oz   How given/devices: Bottle  Bottle/nipple type: Dr. Brown, Level 1    Nipple Shield Use: None    Breast Pumping:  Frequency: 5-6x  Quantity Obtained: 6-10oz each session   Type of Pump: Spectra, Goldie Stride and Platinum   Flange size/type: 24mm  Wearable: Yes  NO pain with pumping    Maternal ROS:  Constitutional: No fever, chills. Feeling well  Breasts: No soreness of breasts and No soreness of nipples  Psychiatric: Managing ok  Mental Health: No mention of feeling irritable, agitated, angry, overwhelmed, apathetic, appetite changes, exhausted nor having sleep changes outside infant feeds/demands.  Current Outpatient Medications on File Prior to Visit   Medication Sig Dispense Refill    Prenatal MV-Min-Fe Fum-FA-DHA (PRENATAL 1 PO) Take  by mouth.       No current facility-administered medications on file prior to visit.      Past Medical History:   Diagnosis Date    Dysuria 5/1/2014    No significant past medical history        Objective:     Maternal Physical Lactation Exam  General: No acute distress  Breasts: Symmetrical  and Soft  Nipples: intact  Psychiatric: Normal mood and affect. Her behavior is normal. Judgment and thought content normal.  Mental Health:  Did NOT exhibit sadness, crying, feeling overwhelmed, agitation or hypervigilance.    Assessment/Plan & Lactation Counseling:     Infant Exam on Infant Chart    Infant Weight History:   08/16/2023: 8# 4.8oz  08/21/2023: 7# 12.2oz (Ped)  08/30/2023: 8# 2oz (Ped)  09/06/2023: 7# 13.1oz  09/14/2023: 8# 6.3oz   09/22/2023: 9# 1.4oz  10/02/2023: 9# 5.8oz  10/12/2023:10# 1oz  10/26/2023: 10# 12.8oz  11/06/2023: 11# 10oz  11/13/2023: 11# 11.1oz  11/27/2023: 12# 13.6oz    Infant Intake at Breast:   R  115mls   L  Not Offered     Total: 115mls  Milk Transfer at this feeding:   Effective Breastfeeding     Pumped: N/A  Initiation of Feeding: Infant initiates  Position of Feeding:    Right: football   Left: not offered   Attachment Achieved: rapidly  Nipple shield: N/A   Suck Pattern at the Breast: Suck burst and normal rest    Suck Pattern on the Bottle: N/A  Behavior Following Observed Feeding: Content   Nipple Pain: None     Latch: Mom latches independently  Suckling/Feeding: attaches, baby falls asleep, baby roots, elicits MATEUS, intermittent swallows, and rhythmic  Sucking strength: Moderate Strong  Sucking Rhythm Coordinated   Compression: WNL    Once latched, baby fell into a mature and fully integrated SSB pattern.    Swallowing No difficulty noted  Milk Supply Available: normal+      MATERNAL PERSONALIZED BREASTFEEDING PLAN  Discussed concerns and symptoms as listed above in assessment and guidance summarized below. Shared decision making was used between myself and the family for this encounter, home care, and follow up. Topics reviewed included:   Feeding:   Infant growing well with feeding. Guidelines to follow:  Continue with 6-7 feedings every 24 hours  Supplement:   Supplement with expressed milk  No top off needed after breastfeeding  Offer 4-5oz in place of breastfeeding   Pumping Guidelines:  Both breasts 5-6x in 24 hours.  In place of breastfeeding   No need to pump after breastfeeding other than first and last  feeding for comfort  Type of Pump:  Hospital grade and Spectra  Ameda [place] Settings [email or web address]/healthcare-professionals/videos/ameda-platinum-with-hygienikit-video   Speed: Start at 80 then turn down to 60 after 1.5-2 minutes when you see milk in the flange channel  Suction: To comfort, goal of  30-50%. NEVER to discomfort.   Today you were at 30   Spectra Settings  Press letdown button when first starting         Cycle: 70 / Vacuum: L1 - L 5 (To comfort)  Once milk lets down, press letdown button again  Cycle: 54 / Vacuum: L1 - L12 (To comfort)  To check the hours on the Spectra  https://[employer]/how-to-check-the-hours-on-your-spectra/  Storage (Acceptable guidelines for healthy term babies)  10 hours at room temp including your pieces, may rinse but not mandatory  8 days refrigerator, don't need  to refrigerate right away if using fresh pumped milk at the next feeding  Freezer 1 year  Deep freezer 2 years  American Academy or Pediatrics has said you may mix different temperature milks.   If baby drinks breast milk from a fresh or refrigerated bottle of breast milk,  you may return the unused portion to the refrigerator and use once at next feeding.   Answers to FAQs: https://www.infantrisk.com/category/breastfeeding  Alcohol & Breastfeeding: What's your time-to-zero?  Cough & Cold Medications while Breastfeeding  Vitamin D Supplementation and Breastfeeding  Antidepressant Use While Breastfeeding: What should I know?  Breastfeeding, Caffeine, and Energy Drinks  Connect with other mothers:  Facebook:   [place] Breastfeeds: https://www.[employer].com/[place].breastfeeds/  Well-Nourished Babies (Private group for questions and support): https://www.facebook.com/groups/979938557726332/  Breastfeeding [place] including opportunity to weight your baby and do before and after weights WEIGHT CHECKS  Tuesdays 10am - 11am. [facility]'s Health at [facility], [address], 3rd floor conference room  Check  your baby's weight, do a feeding and see how your baby is growing, visit with other mothers, plan on a walk or coffee date after group.  Please download Growth: Baby and Child for Apple or Child Growth Tracker for Androids if you would like to  document and follow your baby's growth curve.  Due to space limitations - limit strollers please (New c/section moms please use your stroller).  We would love to have dads stay, but moms won't breastfeed if there are men in the room, sorry.  The room is generally scheduled for another event following group.  Please take all diapers with you   ONLINE SUPPORT GROUPS  Postpartum Support International (PSI) support groups are conducted using a durr-at-xxqm support model and are not intended for those experiencing a mental health crisis. Groups are 90 minutes (1.5 hours) in length. The first ~30 minutes is providing information, education, and establishing group guidelines. The next ~60 minutes is “talk time,” in which group members share and talk with each other. Group members must be present for the group guidelines before joining in the discussion or “talk time.”     Follow up requires close monitoring in this time sensitive window of opportunity to establish milk supply and facilitate the learning of  breastfeeding.    Please call [phone] our voicemail line or the front office at [phone] to scheduled your next appointment.  Family is encouraged to e-mail or mychart us to update how the plan is working.    A total of 60 minutes, including infant assessment time,  was spent preparing to see the patient, obtaining and reviewing separately obtained history, performing a medically appropriate examination and evaluation, counseling and educating the family, referring and communicating with other health care professionals, documenting clinical information in the electronic health record, independently interpreting weighted feeds and infant growth results, communicating these  results to the family and care coordination as detailed in the above note.       Babs Hartmann

## 2024-01-03 ENCOUNTER — NON-PROVIDER VISIT (OUTPATIENT)
Dept: OBGYN | Facility: CLINIC | Age: 34
End: 2024-01-03
Payer: COMMERCIAL

## 2024-01-03 NOTE — PROGRESS NOTES
Summary: Feeding an average of 6-7x every 24 hours. When home mostly breastfeeding throughout the day. When working, breastfeeding 2x and bottle feeding 4-5x, offering 4-5oz. Pumping 2x when home and 4-5x on work days. Currently has over 1000oz of breastmilk saved.   Today: Latched to the right breast, brenda moraBebeto transferred 180mls/6oz in 16 minutes. Content to be held.   Plan: Continue with 6-7 feedings every 24 hours. When latching, offer one or both breast for 10-15 minutes each side if the feeding is going well. No need to offer top off bottle. Continue to offer 4-5oz when not breastfeeding. When home and primarily breastfeeding, pump after first and last feeding of the day and in place of breastfeeding if giving a bottle. Can continue pumping 2-3x at work.     Follow up:   Lactation appointment:  January 3, 2024  Baby 's Provider appointment: 4 Month Well Child Check   Referrals: None    Subjective:     Lisa Whaley is a 33 y.o. female here for lactation care. She is here today with  partner, Rhea and babyBebeto .    Concerns: Weight check, Infant feeding evaluation, and Breastfeeding questions     HPI:   Pertinent  history:     Mother does not have a history of advanced maternal age, GDM, hypertension prior to pregnancy, GHTN, insulin resistance, multiple gestation, PCOS, thyroid disease, auto immune disease , placenta encapsulation, and breast surgery    Breast changes in pregnancy: Yes  History of breast surgeries: No    FEEDING HISTORY:    Previous Breastfeeding History: First baby.   Hospital Course: Exclusively .   Prior to consultation on 2023: Breastfeeding every 2-3 hours throughout the day, occasionally up to 6 hours at night. Offering both breasts for most feedings, total feeding taking an average of 30 minutes. Offering occasional replacement bottles at night, 2-3oz. Pumping after 1-2 feedings, approximately 30-60 minutes later, yielding 20-40mls  of the left and 60-80mls on the right.   Prior to consultation on 9/14/2023: Breastfeeding every 2.5-3 hours during the day, waking baby every 3.5 hours at night. Latching 4x daily then topping off with 45mls. If not breastfeeding, offering 2-2.5oz. Pumping after or in place of breastfeeding. Yielding 20-40mls on the left and 40-80mls on the right.   Prior to consultation on 9/22/2023: Feeding every 3 hours throughout the day, up to 4-6 hours at night. Latching for every other feeding during the day, topping off with 1.5-2oz. If not breastfeeding, offering 2.5-3oz. Pumping after or in place of breastfeeding, yielding 4oz for most sessions during the day and up to 6oz after long stretch at night.   Prior to consultation on 10/2/2023: Feeding every 2-3 hours throughout the day, sooner if breastfeeding and not offering top off bottle. Latching 1-2x in the morning, without a top off bottle, usually right side only. When latching in the afternoon offering 35mls after the breast. If not breastfeeding, offering 3oz. Pumping 7x every 24 hours, after or in place of breastfeeding. Averaging 24-27oz in 24 hours.   Prior to consultation on 10/12/2023: Feeding an average of 6-7x every 24 hours. Breastfeeding several time during the day, then offering 2oz after the breast. If not breastfeeding, offering 4oz. Pumping 7x, yielding 4-6oz at each session.   Prior to consultation on 10/26/2023: Continues to feed 6-7x every 24 hours with long stretch of sleep at night. Breastfeeding 2x in the morning and not offering a top off bottle. Breastfeeding 1-2x in the afternoon then offering 2oz after the breast. If not breastfeeding, offering 4oz. Pumping 6-7x every 24 hours.  Prior to consultation on 11/6/2023: Continues to feed 6-7x every 24 hours with long stretch of sleep at night. Breastfeeding 2x in the morning and not offering a top off bottle. Breastfeeding 1-2x in the afternoon then offering 2oz after the breast. If not  breastfeeding, offering 4-5oz. Pumping 6-7x every 24 hours.  Prior to consultation on 11/13/2023: Lisa returned to work last week. Pumped 3x at work and 2-3x at home, making plenty of milk. Reports difficulty latching once home from work, Bebeto will cry until he is given the bottle. Offering 4oz at each feeding during the day.   Prior to consultation on 11/27/2023: Pumping 3x at work and 2x at home when working, making enough milk for 3 days of work in 1 day. When home, mostly breastfeeding, offering both sides. Pumping after first and last feeding and sometimes one midday. Not topping off after breastfeeding. If not breastfeeding, offering 4-5oz.   Currently 1/3/2023: Feeding an average of 6-7x every 24 hours. When home mostly breastfeeding throughout the day. When working, breastfeeding 2x and bottle feeding 4-5x, offering 4-5oz. Pumping 2x when home and 4-5x on work days. Currently has over 1000oz of breastmilk saved.     Both breasts: Yes    Supplement: Expressed Breastmilk   Quantity: 1-5oz   How given/devices: Bottle  Bottle/nipple type: Dr. Brown, Level 1    Nipple Shield Use: None    Breast Pumping:  Frequency: 2-5x  Quantity Obtained: 6-10oz each session   Type of Pump: Spectra, Goldie Stride and Platinum   Flange size/type: 24mm  Wearable: Yes  NO pain with pumping    Maternal ROS:  Constitutional: No fever, chills. Feeling well  Breasts: No soreness of breasts and No soreness of nipples  Psychiatric: Managing ok  Mental Health: No mention of feeling irritable, agitated, angry, overwhelmed, apathetic, appetite changes, exhausted nor having sleep changes outside infant feeds/demands.  Current Outpatient Medications on File Prior to Visit   Medication Sig Dispense Refill    Prenatal MV-Min-Fe Fum-FA-DHA (PRENATAL 1 PO) Take  by mouth.       No current facility-administered medications on file prior to visit.      Past Medical History:   Diagnosis Date    Dysuria 5/1/2014    No significant past medical  history        Objective:     Maternal Physical Lactation Exam  General: No acute distress  Breasts: Symmetrical  and Soft  Nipples: intact  Psychiatric: Normal mood and affect. Her behavior is normal. Judgment and thought content normal.  Mental Health: Did NOT exhibit sadness, crying, feeling overwhelmed, agitation or hypervigilance.    Assessment/Plan & Lactation Counseling:     Infant Exam on Infant Chart    Infant Weight History:   08/16/2023: 8# 4.8oz  08/21/2023: 7# 12.2oz (Ped)  08/30/2023: 8# 2oz (Ped)  09/06/2023: 7# 13.1oz  09/14/2023: 8# 6.3oz   09/22/2023: 9# 1.4oz  10/02/2023: 9# 5.8oz  10/12/2023:10# 1oz  10/26/2023: 10# 12.8oz  11/06/2023: 11# 10oz  11/13/2023: 11# 11.1oz  11/27/2023: 12# 13.6oz  01/03/2023: 15# 9.6oz    Infant Intake at Breast:   R  180mls   L  Not Offered     Total: 180mls/6oz  Milk Transfer at this feeding:   Effective Breastfeeding     Pumped: N/A  Initiation of Feeding: Infant initiates  Position of Feeding:    Right: football   Left: not offered   Attachment Achieved: rapidly  Nipple shield: N/A   Suck Pattern at the Breast: Suck burst and normal rest    Suck Pattern on the Bottle: N/A  Behavior Following Observed Feeding: Content   Nipple Pain: None     Latch: Mom latches independently  Suckling/Feeding: attaches, baby falls asleep, baby roots, elicits MATEUS, intermittent swallows, and rhythmic  Sucking strength: Moderate Strong  Sucking Rhythm Coordinated   Compression: WNL    Once latched, baby fell into a mature and fully integrated SSB pattern.    Swallowing No difficulty noted  Milk Supply Available: normal+      MATERNAL PERSONALIZED BREASTFEEDING PLAN  Discussed concerns and symptoms as listed above in assessment and guidance summarized below. Shared decision making was used between myself and the family for this encounter, home care, and follow up. Topics reviewed included:   Feeding:   Infant growing well with feeding. Guidelines to follow:  Continue with 6-7 feedings  every 24 hours  Supplement:   Supplement with expressed milk  No top off needed after breastfeeding  Offer 4-5oz in place of breastfeeding   Pumping Guidelines:  Both breasts 2-5x in 24 hours.  In place of breastfeeding   No need to pump after breastfeeding other than first and last feeding for comfort  Type of Pump:  Hospital grade and Spectra  [place]meda [place] Settings [email or web address]-[email or web address]-[email or web address]-video   Speed: Start at 80 then turn down to 60 after 1.5-2 minutes when you see milk in the flange channel  Suction: To comfort, goal of  30-50%. NEVER to discomfort.   Today you were at 30   Spectra Settings  Press letdown button when first starting         Cycle: 70 / Vacuum: L1 - L 5 (To comfort)  Once milk lets down, press letdown button again  Cycle: 54 / Vacuum: L1 - L12 (To comfort)  To check the hours on the Spectra  https://[employer]/how-to-check-the-hours-on-your-spectra/  Storage (Acceptable guidelines for healthy term babies)  10 hours at room temp including your pieces, may rinse but not mandatory  8 days refrigerator, don't need  to refrigerate right away if using fresh pumped milk at the next feeding  Freezer 1 year  Deep freezer 2 years  American Academy or Pediatrics has said you may mix different temperature milks.   If baby drinks breast milk from a fresh or refrigerated bottle of breast milk,  you may return the unused portion to the refrigerator and use once at next feeding.   Answers to FAQs: https://www.infantrisk.com/category/breastfeeding  Alcohol & Breastfeeding: What's your time-to-zero?  Cough & Cold Medications while Breastfeeding  Vitamin D Supplementation and Breastfeeding  Antidepressant Use While Breastfeeding: What should I know?  Breastfeeding, Caffeine, and Energy Drinks  Connect with other mothers:  Facebook:   [place] [facility]: https://www.facebook.com/[place].breastfeeds/  Well-Nourished Babies (Private group for  questions and support): https://www.facebook.com/groups/171264247915817/  Breastfeeding [place] including opportunity to weight your baby and do before and after weights WEIGHT CHECKS  Tuesdays 10am - 11am. [facility] at [address] [place] [address], [address] [place] [address], 3rd floor conference room  Check your baby's weight, do a feeding and see how your baby is growing, visit with other mothers, plan on a walk or coffee date after group.  Please download Growth: Baby and Child for [employer] or Child Growth Tracker for [employer] if you would like to  document and follow your baby's growth curve.  Due to space limitations - limit strollers please (New c/section moms please use your stroller).  We would love to have dads stay, but moms won't breastfeed if there are men in the room, sorry.  The room is generally scheduled for another event following group.  Please take all diapers with you   ONLINE SUPPORT GROUPS  [employer] ([employer]) support groups are conducted using a ivql-sy-qxrw support model and are not intended for those experiencing a mental health crisis. Groups are 90 minutes (1.5 hours) in length. The first ~30 minutes is providing information, education, and establishing group guidelines. The next ~60 minutes is “talk time,” in which group members share and talk with each other. Group members must be present for the group guidelines before joining in the discussion or “talk time.”     Follow up requires close monitoring in this time sensitive window of opportunity to establish milk supply and facilitate the learning of  breastfeeding.    Please call [phone] our voicemail line or the front office at [phone] to scheduled your next appointment.  Family is encouraged to e-mail or mychart us to update how the plan is working.    A total of 45 minutes, including infant assessment time,  was spent preparing to see the patient, obtaining and reviewing separately obtained history, performing a medically appropriate  examination and evaluation, counseling and educating the family, referring and communicating with other health care professionals, documenting clinical information in the electronic health record, independently interpreting weighted feeds and infant growth results, communicating these results to the family and care coordination as detailed in the above note.       Babs Hartmann

## 2024-02-05 ENCOUNTER — NON-PROVIDER VISIT (OUTPATIENT)
Dept: OBGYN | Facility: CLINIC | Age: 34
End: 2024-02-05
Payer: COMMERCIAL

## 2024-02-05 NOTE — PROGRESS NOTES
Summary: Feeding every 3 hours during the day and mostly at night. Offering the right breast when breastfeeding, occasionally offering the left breast. Stopped using the nipple shield since our last appointment. Pumping after first and last feeding and 3x at work, yielding 5-7oz at each session. Offering 5oz bottles when not breastfeeding.   Today: Latched to both breasts without the nipple shield. Transferred 100mls from the right breast, football hold and 20mls from the left breast, cross cradle. Content and playful.    Plan: Continue with frequent feedings during the day. [place] with 4oz bottles instead of 5oz when not breastfeeding. Continue with current pump routine as it is working well.     Follow up:   Lactation appointment:  As Needed  Baby 's Provider appointment: 6 Month Well Child Check   Referrals: None    Subjective:     Lisa Whaley is a 33 y.o. female here for lactation care. She is here today with  partner, Rhea and baby Bebeto .    Concerns: Weight check, Infant feeding evaluation, and Breastfeeding questions     HPI:   Pertinent  history:     Mother does not have a history of advanced maternal age, GDM, hypertension prior to pregnancy, GHTN, insulin resistance, multiple gestation, PCOS, thyroid disease, auto immune disease , placenta encapsulation, and breast surgery    Breast changes in pregnancy: Yes  History of breast surgeries: No    FEEDING HISTORY:    Previous Breastfeeding History: First baby.   Hospital Course: Exclusively .   Prior to consultation on 2023: Breastfeeding every 2-3 hours throughout the day, occasionally up to 6 hours at night. Offering both breasts for most feedings, total feeding taking an average of 30 minutes. Offering occasional replacement bottles at night, 2-3oz. Pumping after 1-2 feedings, approximately 30-60 minutes later, yielding 20-40mls of the left and 60-80mls on the right.   Prior to consultation on 2023:  Breastfeeding every 2.5-3 hours during the day, waking baby every 3.5 hours at night. Latching 4x daily then topping off with 45mls. If not breastfeeding, offering 2-2.5oz. Pumping after or in place of breastfeeding. Yielding 20-40mls on the left and 40-80mls on the right.   Prior to consultation on 9/22/2023: Feeding every 3 hours throughout the day, up to 4-6 hours at night. Latching for every other feeding during the day, topping off with 1.5-2oz. If not breastfeeding, offering 2.5-3oz. Pumping after or in place of breastfeeding, yielding 4oz for most sessions during the day and up to 6oz after long stretch at night.   Prior to consultation on 10/2/2023: Feeding every 2-3 hours throughout the day, sooner if breastfeeding and not offering top off bottle. Latching 1-2x in the morning, without a top off bottle, usually right side only. When latching in the afternoon offering 35mls after the breast. If not breastfeeding, offering 3oz. Pumping 7x every 24 hours, after or in place of breastfeeding. Averaging 24-27oz in 24 hours.   Prior to consultation on 10/12/2023: Feeding an average of 6-7x every 24 hours. Breastfeeding several time during the day, then offering 2oz after the breast. If not breastfeeding, offering 4oz. Pumping 7x, yielding 4-6oz at each session.   Prior to consultation on 10/26/2023: Continues to feed 6-7x every 24 hours with long stretch of sleep at night. Breastfeeding 2x in the morning and not offering a top off bottle. Breastfeeding 1-2x in the afternoon then offering 2oz after the breast. If not breastfeeding, offering 4oz. Pumping 6-7x every 24 hours.  Prior to consultation on 11/6/2023: Continues to feed 6-7x every 24 hours with long stretch of sleep at night. Breastfeeding 2x in the morning and not offering a top off bottle. Breastfeeding 1-2x in the afternoon then offering 2oz after the breast. If not breastfeeding, offering 4-5oz. Pumping 6-7x every 24 hours.  Prior to consultation on  11/13/2023: Lisa returned to work last week. Pumped 3x at work and 2-3x at home, making plenty of milk. Reports difficulty latching once home from work, Bebeto will cry until he is given the bottle. Offering 4oz at each feeding during the day.   Prior to consultation on 11/27/2023: Pumping 3x at work and 2x at home when working, making enough milk for 3 days of work in 1 day. When home, mostly breastfeeding, offering both sides. Pumping after first and last feeding and sometimes one midday. Not topping off after breastfeeding. If not breastfeeding, offering 4-5oz.   Prior to consultation on 1/3/2024: Feeding an average of 6-7x every 24 hours. When home mostly breastfeeding throughout the day. When working, breastfeeding 2x and bottle feeding 4-5x, offering 4-5oz. Pumping 2x when home and 4-5x on work days. Currently has over 1000oz of breastmilk saved.   Currently 2/5/2024: Feeding every 3 hours during the day and mostly at night. Offering the right breast when breastfeeding, occasionally offering the left breast. Stopped using the nipple shield since our last appointment. Pumping after first and last feeding and 3x at work, yielding 5-7oz at each session. Offering 5oz bottles when not breastfeeding.     Both breasts: Yes    Supplement: Expressed Breastmilk   Quantity: 1-5oz   How given/devices: Bottle  Bottle/nipple type: Dr. Brown, Level 1    Nipple Shield Use: None    Breast Pumping:  Frequency: 2-5x  Quantity Obtained: 6-10oz each session   Type of Pump: Spectra, Goldie Stride and Platinum   Flange size/type: 24mm  Wearable: Yes  NO pain with pumping    Maternal ROS:  Constitutional: No fever, chills. Feeling well  Breasts: No soreness of breasts and No soreness of nipples  Psychiatric: Managing ok  Mental Health: No mention of feeling irritable, agitated, angry, overwhelmed, apathetic, appetite changes, exhausted nor having sleep changes outside infant feeds/demands.  Current Outpatient Medications on File  Prior to Visit   Medication Sig Dispense Refill    Prenatal MV-Min-Fe Fum-FA-DHA (PRENATAL 1 PO) Take  by mouth.       No current facility-administered medications on file prior to visit.      Past Medical History:   Diagnosis Date    Dysuria 5/1/2014    No significant past medical history        Objective:     Maternal Physical Lactation Exam  General: No acute distress  Breasts: Symmetrical  and Soft  Nipples: intact  Psychiatric: Normal mood and affect. Her behavior is normal. Judgment and thought content normal.  Mental Health: Did NOT exhibit sadness, crying, feeling overwhelmed, agitation or hypervigilance.    Assessment/Plan & Lactation Counseling:     Infant Exam on Infant Chart    Infant Weight History:   08/16/2023: 8# 4.8oz  08/21/2023: 7# 12.2oz (Ped)  08/30/2023: 8# 2oz (Ped)  09/06/2023: 7# 13.1oz  09/14/2023: 8# 6.3oz   09/22/2023: 9# 1.4oz  10/02/2023: 9# 5.8oz  10/12/2023:10# 1oz  10/26/2023: 10# 12.8oz  11/06/2023: 11# 10oz  11/13/2023: 11# 11.1oz  11/27/2023: 12# 13.6oz  01/03/2024: 15# 9.6oz  02/05/2024: 18# 7.2oz    Infant Intake at Breast:   R  100mls   L 20mls     Total: 120mls/4oz  Milk Transfer at this feeding:   Effective Breastfeeding     Pumped: N/A  Initiation of Feeding: Infant initiates  Position of Feeding:    Right: football   Left: cradle   Attachment Achieved: rapidly  Nipple shield: N/A   Suck Pattern at the Breast: Suck burst and normal rest    Suck Pattern on the Bottle: N/A  Behavior Following Observed Feeding: Content   Nipple Pain: None     Latch: Mom latches independently  Suckling/Feeding: attaches, baby falls asleep, baby roots, elicits MATEUS, intermittent swallows, and rhythmic  Sucking strength: Moderate Strong  Sucking Rhythm Coordinated   Compression: WNL    Once latched, baby fell into a mature and fully integrated SSB pattern.    Swallowing No difficulty noted  Milk Supply Available: normal+      MATERNAL PERSONALIZED BREASTFEEDING PLAN  Discussed concerns and symptoms  as listed above in assessment and guidance summarized below. Shared decision making was used between myself and the family for this encounter, home care, and follow up. Topics reviewed included:   Feeding:   Infant growing well with feeding. Guidelines to follow:  Will decrease bottles from 5oz to 4oz during the day  Supplement:   Supplement with expressed milk  No top off needed after breastfeeding  Offer 4-5oz in place of breastfeeding   Pumping Guidelines:  Both breasts 2-5x in 24 hours.  In place of breastfeeding   No need to pump after breastfeeding other than first and last feeding for comfort  Type of Pump:  Hospital grade and Spectra  Ameda Stickney Settings http://www.[employer]/healthcare-professionals/videos/ameda-platinum-with-hygienikit-video   Speed: Start at 80 then turn down to 60 after 1.5-2 minutes when you see milk in the flange channel  Suction: To comfort, goal of  30-50%. NEVER to discomfort.   Today you were at 30   Spectra Settings  Press letdown button when first starting         Cycle: 70 / Vacuum: L1 - L 5 (To comfort)  Once milk lets down, press letdown button again  Cycle: 54 / Vacuum: L1 - L12 (To comfort)  To check the hours on the Spectra  https://[employer]/how-to-check-the-hours-on-your-spectra/  Storage (Acceptable guidelines for healthy term babies)  10 hours at room temp including your pieces, may rinse but not mandatory  8 days refrigerator, don't need  to refrigerate right away if using fresh pumped milk at the next feeding  Freezer 1 year  Deep freezer 2 years  American Academy or Pediatrics has said you may mix different temperature milks.   If baby drinks breast milk from a fresh or refrigerated bottle of breast milk,  you may return the unused portion to the refrigerator and use once at next feeding.   Answers to FAQs: https://www.infantrisk.com/category/breastfeeding  Alcohol & Breastfeeding: What's your time-to-zero?  Cough & Cold Medications while  Breastfeeding  Vitamin D Supplementation and Breastfeeding  Antidepressant Use While Breastfeeding: What should I know?  Breastfeeding, Caffeine, and Energy Drinks  Connect with other mothers:  Facebook:   [place] Breast[facility]: https://www.facebook.com/[place].breastfeeds/  Well-Nourished Babies (Private group for questions and support): https://www.facebook.com/groups/801897200192333/  Breastfeeding [place] including opportunity to weight your baby and do before and after weights WEIGHT CHECKS  Tuesdays 10am - 11am. Women's Health at [address] [place] [address], [facility] [address] [place] [address], 3rd floor conference room  Check your baby's weight, do a feeding and see how your baby is growing, visit with other mothers, plan on a walk or coffee date after group.  Please download Growth: Baby and Child for [employer] or Child Growth Tracker for [employer] if you would like to  document and follow your baby's growth curve.  Due to space limitations - limit strollers please (New c/section moms please use your stroller).  We would love to have dads stay, but moms won't breastfeed if there are men in the room, sorry.  The room is generally scheduled for another event following group.  Please take all diapers with you   ONLINE SUPPORT GROUPS  [employer] (PSI) support groups are conducted using a ptsu-gk-ooat support model and are not intended for those experiencing a mental health crisis. Groups are 90 minutes (1.5 hours) in length. The first ~30 minutes is providing information, education, and establishing group guidelines. The next ~60 minutes is “talk time,” in which group members share and talk with each other. Group members must be present for the group guidelines before joining in the discussion or “talk time.”     Follow up requires close monitoring in this time sensitive window of opportunity to establish milk supply and facilitate the learning of  breastfeeding.    Please call [phone] our voicemail line or the front office at  [phone] to scheduled your next appointment.  Family is encouraged to e-mail or mychart us to update how the plan is working.    A total of 45 minutes, including infant assessment time,  was spent preparing to see the patient, obtaining and reviewing separately obtained history, performing a medically appropriate examination and evaluation, counseling and educating the family, referring and communicating with other health care professionals, documenting clinical information in the electronic health record, independently interpreting weighted feeds and infant growth results, communicating these results to the family and care coordination as detailed in the above note.       Babs Hartmann

## 2024-03-11 ENCOUNTER — EH NON-PROVIDER (OUTPATIENT)
Dept: OCCUPATIONAL MEDICINE | Facility: CLINIC | Age: 34
End: 2024-03-11

## 2024-03-11 DIAGNOSIS — Z02.89 ENCOUNTER FOR OCCUPATIONAL HEALTH ASSESSMENT: ICD-10-CM

## 2024-03-11 PROCEDURE — 94375 RESPIRATORY FLOW VOLUME LOOP: CPT | Performed by: NURSE PRACTITIONER

## 2024-04-30 ENCOUNTER — HOSPITAL ENCOUNTER (OUTPATIENT)
Facility: MEDICAL CENTER | Age: 34
End: 2024-04-30
Attending: OBSTETRICS & GYNECOLOGY
Payer: COMMERCIAL

## 2024-05-03 LAB
CYTOLOGIST CVX/VAG CYTO: NORMAL
CYTOLOGY CVX/VAG DOC CYTO: NORMAL
CYTOLOGY CVX/VAG DOC THIN PREP: NORMAL
HPV I/H RISK 4 DNA CVX QL PROBE+SIG AMP: NEGATIVE
NOTE NL11727A: NORMAL
OTHER STN SPEC: NORMAL
STAT OF ADQ CVX/VAG CYTO-IMP: NORMAL

## 2024-09-24 ENCOUNTER — APPOINTMENT (OUTPATIENT)
Dept: OCCUPATIONAL MEDICINE | Facility: CLINIC | Age: 34
End: 2024-09-24
Payer: COMMERCIAL

## 2024-09-26 ENCOUNTER — IMMUNIZATION (OUTPATIENT)
Dept: OCCUPATIONAL MEDICINE | Facility: CLINIC | Age: 34
End: 2024-09-26

## 2024-09-26 DIAGNOSIS — Z23 NEED FOR VACCINATION: Primary | ICD-10-CM

## 2024-10-02 PROCEDURE — 90656 IIV3 VACC NO PRSV 0.5 ML IM: CPT | Performed by: PREVENTIVE MEDICINE

## 2024-10-21 ENCOUNTER — HOSPITAL ENCOUNTER (OUTPATIENT)
Facility: MEDICAL CENTER | Age: 34
End: 2024-10-21
Attending: OBSTETRICS & GYNECOLOGY
Payer: COMMERCIAL

## 2024-10-21 PROCEDURE — 88142 CYTOPATH C/V THIN LAYER: CPT

## 2024-10-30 ENCOUNTER — HOSPITAL ENCOUNTER (OUTPATIENT)
Dept: LAB | Facility: MEDICAL CENTER | Age: 34
End: 2024-10-30
Attending: PHYSICIAN ASSISTANT
Payer: COMMERCIAL

## 2024-10-30 ENCOUNTER — APPOINTMENT (OUTPATIENT)
Dept: MEDICAL GROUP | Facility: PHYSICIAN GROUP | Age: 34
End: 2024-10-30
Payer: COMMERCIAL

## 2024-10-30 VITALS
HEART RATE: 95 BPM | HEIGHT: 66 IN | RESPIRATION RATE: 16 BRPM | OXYGEN SATURATION: 97 % | WEIGHT: 293 LBS | BODY MASS INDEX: 47.09 KG/M2 | SYSTOLIC BLOOD PRESSURE: 122 MMHG | TEMPERATURE: 97.7 F | DIASTOLIC BLOOD PRESSURE: 68 MMHG

## 2024-10-30 DIAGNOSIS — D64.9 ANEMIA, UNSPECIFIED TYPE: ICD-10-CM

## 2024-10-30 DIAGNOSIS — E55.9 VITAMIN D DEFICIENCY: ICD-10-CM

## 2024-10-30 DIAGNOSIS — R00.2 PALPITATIONS: ICD-10-CM

## 2024-10-30 DIAGNOSIS — E78.5 DYSLIPIDEMIA: ICD-10-CM

## 2024-10-30 PROBLEM — N88.3 CERVICAL INCOMPETENCE: Status: RESOLVED | Noted: 2023-03-27 | Resolved: 2024-10-30

## 2024-10-30 PROBLEM — R74.8 ELEVATED ALKALINE PHOSPHATASE LEVEL: Status: RESOLVED | Noted: 2022-10-25 | Resolved: 2024-10-30

## 2024-10-30 PROBLEM — Z31.9 PATIENT DESIRES PREGNANCY: Status: RESOLVED | Noted: 2022-10-10 | Resolved: 2024-10-30

## 2024-10-30 LAB
25(OH)D3 SERPL-MCNC: 20 NG/ML (ref 30–100)
ALBUMIN SERPL BCP-MCNC: 4.5 G/DL (ref 3.2–4.9)
ALBUMIN/GLOB SERPL: 1.6 G/DL
ALP SERPL-CCNC: 126 U/L (ref 30–99)
ALT SERPL-CCNC: 21 U/L (ref 2–50)
ANION GAP SERPL CALC-SCNC: 13 MMOL/L (ref 7–16)
AST SERPL-CCNC: 17 U/L (ref 12–45)
BASOPHILS # BLD AUTO: 0.3 % (ref 0–1.8)
BASOPHILS # BLD: 0.03 K/UL (ref 0–0.12)
BILIRUB SERPL-MCNC: 0.5 MG/DL (ref 0.1–1.5)
BUN SERPL-MCNC: 13 MG/DL (ref 8–22)
CALCIUM ALBUM COR SERPL-MCNC: 9 MG/DL (ref 8.5–10.5)
CALCIUM SERPL-MCNC: 9.4 MG/DL (ref 8.5–10.5)
CHLORIDE SERPL-SCNC: 100 MMOL/L (ref 96–112)
CHOLEST SERPL-MCNC: 183 MG/DL (ref 100–199)
CO2 SERPL-SCNC: 24 MMOL/L (ref 20–33)
CREAT SERPL-MCNC: 0.63 MG/DL (ref 0.5–1.4)
EOSINOPHIL # BLD AUTO: 0.11 K/UL (ref 0–0.51)
EOSINOPHIL NFR BLD: 0.9 % (ref 0–6.9)
ERYTHROCYTE [DISTWIDTH] IN BLOOD BY AUTOMATED COUNT: 39.2 FL (ref 35.9–50)
FERRITIN SERPL-MCNC: 110 NG/ML (ref 10–291)
FOLATE SERPL-MCNC: 7.9 NG/ML
GFR SERPLBLD CREATININE-BSD FMLA CKD-EPI: 119 ML/MIN/1.73 M 2
GLOBULIN SER CALC-MCNC: 2.9 G/DL (ref 1.9–3.5)
GLUCOSE SERPL-MCNC: 98 MG/DL (ref 65–99)
HCT VFR BLD AUTO: 44.1 % (ref 37–47)
HDLC SERPL-MCNC: 50 MG/DL
HGB BLD-MCNC: 14.5 G/DL (ref 12–16)
IMM GRANULOCYTES # BLD AUTO: 0.04 K/UL (ref 0–0.11)
IMM GRANULOCYTES NFR BLD AUTO: 0.3 % (ref 0–0.9)
IRON SATN MFR SERPL: 26 % (ref 15–55)
IRON SERPL-MCNC: 79 UG/DL (ref 40–170)
LDLC SERPL CALC-MCNC: 110 MG/DL
LYMPHOCYTES # BLD AUTO: 1.56 K/UL (ref 1–4.8)
LYMPHOCYTES NFR BLD: 13.3 % (ref 22–41)
MCH RBC QN AUTO: 28.5 PG (ref 27–33)
MCHC RBC AUTO-ENTMCNC: 32.9 G/DL (ref 32.2–35.5)
MCV RBC AUTO: 86.8 FL (ref 81.4–97.8)
MONOCYTES # BLD AUTO: 0.45 K/UL (ref 0–0.85)
MONOCYTES NFR BLD AUTO: 3.8 % (ref 0–13.4)
NEUTROPHILS # BLD AUTO: 9.55 K/UL (ref 1.82–7.42)
NEUTROPHILS NFR BLD: 81.4 % (ref 44–72)
NRBC # BLD AUTO: 0 K/UL
NRBC BLD-RTO: 0 /100 WBC (ref 0–0.2)
PLATELET # BLD AUTO: 396 K/UL (ref 164–446)
PMV BLD AUTO: 9.2 FL (ref 9–12.9)
POTASSIUM SERPL-SCNC: 4.1 MMOL/L (ref 3.6–5.5)
PROT SERPL-MCNC: 7.4 G/DL (ref 6–8.2)
RBC # BLD AUTO: 5.08 M/UL (ref 4.2–5.4)
SODIUM SERPL-SCNC: 137 MMOL/L (ref 135–145)
TIBC SERPL-MCNC: 299 UG/DL (ref 250–450)
TRIGL SERPL-MCNC: 117 MG/DL (ref 0–149)
TSH SERPL DL<=0.005 MIU/L-ACNC: 1.46 UIU/ML (ref 0.38–5.33)
UIBC SERPL-MCNC: 220 UG/DL (ref 110–370)
VIT B12 SERPL-MCNC: 628 PG/ML (ref 211–911)
WBC # BLD AUTO: 11.7 K/UL (ref 4.8–10.8)

## 2024-10-30 PROCEDURE — 85025 COMPLETE CBC W/AUTO DIFF WBC: CPT

## 2024-10-30 PROCEDURE — 82728 ASSAY OF FERRITIN: CPT

## 2024-10-30 PROCEDURE — 82306 VITAMIN D 25 HYDROXY: CPT

## 2024-10-30 PROCEDURE — 83540 ASSAY OF IRON: CPT

## 2024-10-30 PROCEDURE — 80061 LIPID PANEL: CPT

## 2024-10-30 PROCEDURE — 80053 COMPREHEN METABOLIC PANEL: CPT

## 2024-10-30 PROCEDURE — 84443 ASSAY THYROID STIM HORMONE: CPT

## 2024-10-30 PROCEDURE — 82607 VITAMIN B-12: CPT

## 2024-10-30 PROCEDURE — 82746 ASSAY OF FOLIC ACID SERUM: CPT

## 2024-10-30 PROCEDURE — 83550 IRON BINDING TEST: CPT

## 2024-10-30 PROCEDURE — 36415 COLL VENOUS BLD VENIPUNCTURE: CPT

## 2024-10-30 ASSESSMENT — ANXIETY QUESTIONNAIRES
GAD7 TOTAL SCORE: 4
3. WORRYING TOO MUCH ABOUT DIFFERENT THINGS: SEVERAL DAYS
1. FEELING NERVOUS, ANXIOUS, OR ON EDGE: SEVERAL DAYS
6. BECOMING EASILY ANNOYED OR IRRITABLE: NOT AT ALL
2. NOT BEING ABLE TO STOP OR CONTROL WORRYING: NOT AT ALL
7. FEELING AFRAID AS IF SOMETHING AWFUL MIGHT HAPPEN: SEVERAL DAYS
5. BEING SO RESTLESS THAT IT IS HARD TO SIT STILL: SEVERAL DAYS
4. TROUBLE RELAXING: NOT AT ALL

## 2024-10-30 ASSESSMENT — PATIENT HEALTH QUESTIONNAIRE - PHQ9: CLINICAL INTERPRETATION OF PHQ2 SCORE: 0

## 2024-10-30 ASSESSMENT — ENCOUNTER SYMPTOMS
SHORTNESS OF BREATH: 0
CHILLS: 0
FEVER: 0

## 2024-10-30 ASSESSMENT — FIBROSIS 4 INDEX: FIB4 SCORE: 0.65

## 2024-10-31 DIAGNOSIS — R74.8 ELEVATED ALKALINE PHOSPHATASE LEVEL: ICD-10-CM

## 2024-10-31 DIAGNOSIS — E55.9 VITAMIN D DEFICIENCY: ICD-10-CM

## 2024-10-31 DIAGNOSIS — E78.5 DYSLIPIDEMIA: ICD-10-CM

## 2024-10-31 DIAGNOSIS — D72.829 LEUKOCYTOSIS, UNSPECIFIED TYPE: ICD-10-CM

## 2024-11-01 LAB
HPV I/H RISK 1 DNA SPEC QL NAA+PROBE: NOT DETECTED
SPECIMEN SOURCE: NORMAL
THINPREP PAP, CYTOLOGY NL11781: NORMAL

## 2024-11-19 ENCOUNTER — NON-PROVIDER VISIT (OUTPATIENT)
Dept: CARDIOLOGY | Facility: MEDICAL CENTER | Age: 34
End: 2024-11-19
Attending: PHYSICIAN ASSISTANT
Payer: COMMERCIAL

## 2024-11-19 DIAGNOSIS — R00.2 PALPITATIONS: ICD-10-CM

## 2024-11-19 PROCEDURE — 93246 EXT ECG>7D<15D RECORDING: CPT

## 2024-12-06 ENCOUNTER — TELEPHONE (OUTPATIENT)
Dept: CARDIOLOGY | Facility: MEDICAL CENTER | Age: 34
End: 2024-12-06
Payer: COMMERCIAL

## 2024-12-07 NOTE — TELEPHONE ENCOUNTER
OMARI EOS to BE for review on 12/9/24 as ADD    Preliminary findings:    Sinus Rhythm 50/151 with an avg rate of 85 bpm    Supraventricular ectopics were rare with no noted triplets    Isolated VE(s) were rare, no couplets or triplets were present    Patient recorded 15 events associated with:  SR   SVE(s)  VE(s)

## 2025-03-19 ENCOUNTER — EH NON-PROVIDER (OUTPATIENT)
Dept: OCCUPATIONAL MEDICINE | Facility: CLINIC | Age: 35
End: 2025-03-19

## 2025-03-19 DIAGNOSIS — Z02.89 ENCOUNTER FOR OCCUPATIONAL HEALTH EXAMINATION INVOLVING RESPIRATOR: ICD-10-CM

## 2025-03-19 PROCEDURE — 94375 RESPIRATORY FLOW VOLUME LOOP: CPT | Performed by: NURSE PRACTITIONER

## 2025-04-07 ENCOUNTER — OFFICE VISIT (OUTPATIENT)
Dept: URGENT CARE | Facility: PHYSICIAN GROUP | Age: 35
End: 2025-04-07
Payer: COMMERCIAL

## 2025-04-07 ENCOUNTER — RESULTS FOLLOW-UP (OUTPATIENT)
Dept: URGENT CARE | Facility: PHYSICIAN GROUP | Age: 35
End: 2025-04-07

## 2025-04-07 VITALS
TEMPERATURE: 98.7 F | HEIGHT: 66 IN | OXYGEN SATURATION: 97 % | BODY MASS INDEX: 47.09 KG/M2 | RESPIRATION RATE: 18 BRPM | HEART RATE: 94 BPM | DIASTOLIC BLOOD PRESSURE: 78 MMHG | WEIGHT: 293 LBS | SYSTOLIC BLOOD PRESSURE: 126 MMHG

## 2025-04-07 DIAGNOSIS — J02.9 SORE THROAT: ICD-10-CM

## 2025-04-07 DIAGNOSIS — H10.33 ACUTE BACTERIAL CONJUNCTIVITIS OF BOTH EYES: ICD-10-CM

## 2025-04-07 LAB
FLUAV RNA SPEC QL NAA+PROBE: NEGATIVE
FLUBV RNA SPEC QL NAA+PROBE: NEGATIVE
RSV RNA SPEC QL NAA+PROBE: NEGATIVE
S PYO DNA SPEC NAA+PROBE: NOT DETECTED
SARS-COV-2 RNA RESP QL NAA+PROBE: NEGATIVE

## 2025-04-07 PROCEDURE — 99214 OFFICE O/P EST MOD 30 MIN: CPT | Performed by: PHYSICIAN ASSISTANT

## 2025-04-07 PROCEDURE — 3078F DIAST BP <80 MM HG: CPT | Performed by: PHYSICIAN ASSISTANT

## 2025-04-07 PROCEDURE — 0241U POCT CEPHEID COV-2, FLU A/B, RSV - PCR: CPT | Performed by: PHYSICIAN ASSISTANT

## 2025-04-07 PROCEDURE — 87651 STREP A DNA AMP PROBE: CPT | Performed by: PHYSICIAN ASSISTANT

## 2025-04-07 PROCEDURE — 3074F SYST BP LT 130 MM HG: CPT | Performed by: PHYSICIAN ASSISTANT

## 2025-04-07 RX ORDER — POLYMYXIN B SULFATE AND TRIMETHOPRIM 1; 10000 MG/ML; [USP'U]/ML
1 SOLUTION OPHTHALMIC 4 TIMES DAILY
Qty: 10 ML | Refills: 0 | Status: SHIPPED | OUTPATIENT
Start: 2025-04-07 | End: 2025-04-14

## 2025-04-07 ASSESSMENT — ENCOUNTER SYMPTOMS
PHOTOPHOBIA: 0
FEVER: 0
COUGH: 1
MYALGIAS: 0
DOUBLE VISION: 0
SORE THROAT: 1
EYE REDNESS: 1
EYE DISCHARGE: 1
BLURRED VISION: 0
EYE PAIN: 0
CHILLS: 0

## 2025-04-07 ASSESSMENT — VISUAL ACUITY: OU: 1

## 2025-04-07 ASSESSMENT — FIBROSIS 4 INDEX: FIB4 SCORE: 0.32

## 2025-04-07 NOTE — PROGRESS NOTES
Subjective:   Lisa Whaley is a 34 y.o. female who presents today with   Chief Complaint   Patient presents with    Sinusitis     Sore throat, 7 weeks pregnant     Conjunctivitis     Pharyngitis   This is a new problem. Episode onset: 4 days. The problem has been unchanged. There has been no fever. Associated symptoms include congestion and coughing. She has tried acetaminophen for the symptoms. The treatment provided mild relief.     Patient states she is 7 weeks pregnant.  Not currently breast-feeding.  Patient states she woke up this morning and her eyes were crusted shut.  No recent injury or trauma to the eyes.    PMH:  has a past medical history of Dysuria (5/1/2014) and No significant past medical history.    She has no past medical history of ASTHMA, CAD (coronary artery disease), Cancer ([facility]), Congestive heart failure ([facility]), COPD, Diabetes, Hypertension, Infectious disease, Liver disease, Psychiatric disorder, Renal disorder, Seizure disorder ([facility]), or Stroke ([facility]).  MEDS:   Current Outpatient Medications:     polymixin-trimethoprim (POLYTRIM) 79296-0.1 UNIT/ML-% Solution, Administer 1 Drop into both eyes 4 times a day for 7 days., Disp: 10 mL, Rfl: 0    omeprazole (PRILOSEC) 20 MG delayed-release capsule, Take 20 mg by mouth every day., Disp: , Rfl:     Prenatal MV-Min-Fe Fum-FA-DHA (PRENATAL 1 PO), Take  by mouth., Disp: , Rfl:   ALLERGIES:   Allergies   Allergen Reactions    Vicodin [Hydrocodone-Acetaminophen] Nausea     SURGHX:   Past Surgical History:   Procedure Laterality Date    CERVICAL CERCLAGE N/A 3/27/2023    Procedure: CERCLAGE, CERVIX;  Surgeon: Celestine Wang M.D.;  Location: [facility];  Service: Labor and Delivery    TONSILLECTOMY      at age 16     SOCHX:  reports that she has quit smoking. Her smoking use included cigarettes. She has a 0.1 pack-year smoking history. She has never used smokeless tobacco. She reports that she does not currently use alcohol. She  "reports that she does not currently use drugs after having used the following drugs: Marijuana.  FH: Reviewed with patient, not pertinent to this visit.     Review of Systems   Constitutional:  Negative for chills and fever.   HENT:  Positive for congestion and sore throat.    Eyes:  Positive for discharge and redness. Negative for blurred vision, double vision, photophobia and pain.   Respiratory:  Positive for cough.    Musculoskeletal:  Negative for myalgias.      Objective:   /78   Pulse 94   Temp 37.1 °C (98.7 °F)   Resp 18   Ht 1.676 m (5' 6\")   Wt (!) 141 kg (310 lb)   SpO2 97%   Breastfeeding No   BMI 50.04 kg/m²   Physical Exam  Vitals and nursing note reviewed.   Constitutional:       General: She is not in acute distress.     Appearance: Normal appearance. She is well-developed. She is not ill-appearing or toxic-appearing.   HENT:      Head: Normocephalic and atraumatic.      Right Ear: Hearing normal.      Left Ear: Hearing normal.      Mouth/Throat:      Mouth: Mucous membranes are moist.      Pharynx: Uvula midline. Posterior oropharyngeal erythema present. No oropharyngeal exudate or uvula swelling.      Tonsils: No tonsillar abscesses.   Eyes:      General: Vision grossly intact.         Right eye: No foreign body or discharge.         Left eye: No foreign body or discharge.      Extraocular Movements: Extraocular movements intact.      Conjunctiva/sclera:      Right eye: Right conjunctiva is injected.      Left eye: Left conjunctiva is injected.      Pupils: Pupils are equal, round, and reactive to light.   Cardiovascular:      Rate and Rhythm: Normal rate and regular rhythm.      Heart sounds: Normal heart sounds.   Pulmonary:      Effort: Pulmonary effort is normal. No respiratory distress.      Breath sounds: Normal breath sounds. No stridor. No wheezing, rhonchi or rales.   Musculoskeletal:      Comments: Normal movement in all 4 extremities   Skin:     General: Skin is warm and " dry.   Neurological:      Mental Status: She is alert.      Coordination: Coordination normal.   Psychiatric:         Mood and Affect: Mood normal.       STREP A -  COVID -  FLU -  RSV -    Assessment/Plan:   Assessment    1. Sore throat  - POCT CoV-2, Flu A/B, RSV by PCR  - POCT GROUP A STREP, PCR    2. Acute bacterial conjunctivitis of both eyes  - polymixin-trimethoprim (POLYTRIM) 35785-9.1 UNIT/ML-% Solution; Administer 1 Drop into both eyes 4 times a day for 7 days.  Dispense: 10 mL; Refill: 0    Suggest continue with pregnancy safe over-the-counter symptomatic relief such as salt water gargles, lozenges, cool liquids to help alleviate sore throat.  Symptoms do appear consistent with possible conjunctivitis and if continuing to have discharge/crusting from the eyes would recommend starting on antibiotic drops at that time.  No indication for oral antibiotics at this time.  Likely viral illness.  Vital signs are stable.    Differential diagnosis, natural history, supportive care, and indications for immediate follow-up discussed.   Patient given instructions and understanding of medications and treatment.    If not improving in 3-5 days, F/U with PCP or return to  if symptoms worsen.    Patient agreeable to plan.    Please note that this dictation was created using voice recognition software. I have made every reasonable attempt to correct obvious errors, but I expect that there are errors of grammar and possibly content that I did not discover before finalizing the note.    Terrance Chiang PA-C

## 2025-04-10 ENCOUNTER — HOSPITAL ENCOUNTER (OUTPATIENT)
Facility: MEDICAL CENTER | Age: 35
End: 2025-04-10
Payer: COMMERCIAL

## 2025-04-10 ENCOUNTER — OFFICE VISIT (OUTPATIENT)
Dept: URGENT CARE | Facility: PHYSICIAN GROUP | Age: 35
End: 2025-04-10
Payer: COMMERCIAL

## 2025-04-10 VITALS
WEIGHT: 293 LBS | HEIGHT: 68 IN | DIASTOLIC BLOOD PRESSURE: 74 MMHG | HEART RATE: 98 BPM | BODY MASS INDEX: 44.41 KG/M2 | SYSTOLIC BLOOD PRESSURE: 108 MMHG | OXYGEN SATURATION: 97 % | TEMPERATURE: 97.2 F | RESPIRATION RATE: 14 BRPM

## 2025-04-10 DIAGNOSIS — J02.9 SORE THROAT: ICD-10-CM

## 2025-04-10 PROCEDURE — 99214 OFFICE O/P EST MOD 30 MIN: CPT

## 2025-04-10 PROCEDURE — 3078F DIAST BP <80 MM HG: CPT

## 2025-04-10 PROCEDURE — 3074F SYST BP LT 130 MM HG: CPT

## 2025-04-10 PROCEDURE — 87070 CULTURE OTHR SPECIMN AEROBIC: CPT

## 2025-04-10 ASSESSMENT — FIBROSIS 4 INDEX: FIB4 SCORE: 0.32

## 2025-04-10 NOTE — LETTER
[facility] [address] [address]     April 10, 2025    Patient: Lisa Whaley   YOB: 1990   Date of Visit: 4/10/2025       To Whom It May Concern:    Lisa Whaley was seen and treated in our department on 4/10/2025. Please excuse Patient this week due to recent illness.     Sincerely,     MANOJ Felipe.

## 2025-04-10 NOTE — PROGRESS NOTES
Subjective:   Lisa Whaley is a 34 y.o. female who presents for Pharyngitis (Was here Monday for same problem, pt is 7 weeks pregnant, painful to swallow, right side of throat is painful, pt needs a doc note for work.)      Pharyngitis   This is a new problem. The current episode started in the past 7 days. The problem has been gradually worsening. The pain is worse on the right side. There has been no fever. The patient is experiencing no pain. Associated symptoms include swollen glands. Pertinent negatives include no abdominal pain, congestion, coughing, diarrhea, drooling, ear discharge, ear pain, headaches, hoarse voice, plugged ear sensation, neck pain, shortness of breath, stridor, trouble swallowing or vomiting. She has had no exposure to strep or mono. Treatments tried: OTC Robitussin/Tylenol. The treatment provided mild relief.       Review of Systems   Constitutional:  Negative for chills, fever and malaise/fatigue.   HENT:  Positive for sore throat. Negative for congestion, drooling, ear discharge, ear pain, hearing loss, hoarse voice and trouble swallowing.    Respiratory:  Negative for cough, shortness of breath and stridor.    Cardiovascular:  Negative for chest pain.   Gastrointestinal:  Negative for abdominal pain, diarrhea, nausea and vomiting.   Genitourinary:  Negative for dysuria.   Musculoskeletal:  Negative for myalgias and neck pain.   Skin:  Negative for rash.   Neurological:  Negative for headaches.       Allergies   Allergen Reactions    Vicodin [Hydrocodone-Acetaminophen] Nausea       Patient Active Problem List    Diagnosis Date Noted    Anemia 10/30/2024    Palpitations 10/30/2024    Dyslipidemia 10/25/2022    Vitamin D deficiency 10/01/2018    Morbid obesity with BMI of 45.0-49.9, adult ([facility]) 06/14/2018       Current Outpatient Medications Ordered in Epic   Medication Sig Dispense Refill    polymixin-trimethoprim (POLYTRIM) 85817-2.1 UNIT/ML-% Solution Administer 1 Drop into  "both eyes 4 times a day for 7 days. 10 mL 0    omeprazole (PRILOSEC) 20 MG delayed-release capsule Take 20 mg by mouth every day.      Prenatal MV-Min-Fe Fum-FA-DHA (PRENATAL 1 PO) Take  by mouth.       No current Epic-ordered facility-administered medications on file.       Past Surgical History:   Procedure Laterality Date    CERVICAL CERCLAGE N/A 3/27/2023    Procedure: CERCLAGE, CERVIX;  Surgeon: Celestine Wang M.D.;  Location: SURGERY LABOR [facility] DELIVERY;  Service: Labor and Delivery    TONSILLECTOMY      at age 16       Social History     Tobacco Use    Smoking status: Former     Current packs/day: 0.10     Average packs/day: 0.1 packs/day for 1 year (0.1 ttl pk-yrs)     Types: Cigarettes     Passive exposure: Past    Smokeless tobacco: Never    Tobacco comments:     previously occas, 2 cig/day x 1 yr   Vaping Use    Vaping status: Never Used   Substance Use Topics    Alcohol use: Not Currently     Comment: about once a month    Drug use: Not Currently     Types: Marijuana     Comment: denies h/o IVDU       family history includes GI Disease in her father; Heart Disease in her father; Hypertension in her father; No Known Problems in her maternal grandfather, maternal grandmother, mother, paternal grandfather, paternal grandmother, and sister.     Problem list, medications, and allergies reviewed by myself today in Epic.     Objective:   /74 (BP Location: Right arm, Patient Position: Sitting, BP Cuff Size: Large adult)   Pulse 98   Temp 36.2 °C (97.2 °F)   Resp 14   Ht 1.715 m (5' 7.5\")   Wt (!) 140 kg (308 lb 9.6 oz)   SpO2 97%   BMI 47.62 kg/m²     Physical Exam  Vitals and nursing note reviewed.   Constitutional:       General: She is not in acute distress.     Appearance: Normal appearance. She is ill-appearing.   HENT:      Head: Normocephalic and atraumatic.      Right Ear: Tympanic membrane normal.      Left Ear: Tympanic membrane normal.      Nose: Congestion present.      Mouth/Throat:      " Mouth: Mucous membranes are moist.      Pharynx: Uvula midline. Posterior oropharyngeal erythema present. No pharyngeal swelling, oropharyngeal exudate, uvula swelling or postnasal drip.   Eyes:      Conjunctiva/sclera: Conjunctivae normal.   Cardiovascular:      Rate and Rhythm: Normal rate.      Heart sounds: Normal heart sounds.   Pulmonary:      Effort: Pulmonary effort is normal. No respiratory distress.      Breath sounds: No stridor. No wheezing or rhonchi.   Abdominal:      General: Abdomen is flat.      Palpations: Abdomen is soft.   Musculoskeletal:         General: Normal range of motion.      Cervical back: Normal range of motion.   Skin:     General: Skin is warm and dry.      Capillary Refill: Capillary refill takes less than 2 seconds.   Neurological:      Mental Status: She is alert and oriented to person, place, and time.   Psychiatric:         Mood and Affect: Mood normal.         Behavior: Behavior normal.         Assessment/Plan:     1. Sore throat  CULTURE THROAT        After assessment patient here with persistent sore throat after being seen in our urgent care on Monday.  Patient is currently 8 weeks pregnant and has been taking over-the-counter medications along with Tylenol with minimal relief of symptoms.  Patient reports that the pain has become more persistent on the right side.  Previous provider note was reviewed at this time and patient did have a viral and strep swab performed which were negative.  Patient is asking if there is anything else that she can use while pregnant.  We did discuss certain over-the-counter options and I did also decide to culture a throat swab to verify the patient does not have a secondary strain of strep.  Patient instructed to monitor for any worsening signs and symptoms and if any other concerns she was instructed return to urgent care for reevaluation.    Differential diagnosis, natural history, and supportive care discussed. We also reviewed side effects  of medication including allergic response, GI upset, tendon injury, rash, sedation etc. Patient and/or guardian voices understanding.      Advised the patient to follow-up with the primary care physician for recheck, reevaluation, and consideration of further management.    Please note that this dictation was created using voice recognition software. I have made every reasonable attempt to correct obvious errors, but I expect that there are errors of grammar and possibly content that I did not discover before finalizing the note.    This note was electronically signed by DANIELLE Burdick

## 2025-04-11 ASSESSMENT — ENCOUNTER SYMPTOMS
COUGH: 0
HEADACHES: 0
ABDOMINAL PAIN: 0
TROUBLE SWALLOWING: 0
STRIDOR: 0
CHILLS: 0
NAUSEA: 0
SHORTNESS OF BREATH: 0
DIARRHEA: 0
VOMITING: 0
SWOLLEN GLANDS: 1
FEVER: 0
HOARSE VOICE: 0
MYALGIAS: 0
SORE THROAT: 1
NECK PAIN: 0

## 2025-04-12 LAB
BACTERIA SPEC RESP CULT: NORMAL
SIGNIFICANT IND 70042: NORMAL
SITE SITE: NORMAL
SOURCE SOURCE: NORMAL

## 2025-04-14 ENCOUNTER — RESULTS FOLLOW-UP (OUTPATIENT)
Dept: URGENT CARE | Facility: PHYSICIAN GROUP | Age: 35
End: 2025-04-14

## 2025-05-06 ENCOUNTER — HOSPITAL ENCOUNTER (OUTPATIENT)
Dept: LAB | Facility: MEDICAL CENTER | Age: 35
End: 2025-05-06
Attending: OBSTETRICS & GYNECOLOGY
Payer: COMMERCIAL

## 2025-05-06 LAB
ABO GROUP BLD: NORMAL
APPEARANCE UR: ABNORMAL
BACTERIA #/AREA URNS HPF: ABNORMAL /HPF
BASOPHILS # BLD AUTO: 0.6 % (ref 0–1.8)
BASOPHILS # BLD: 0.06 K/UL (ref 0–0.12)
BILIRUB UR QL STRIP.AUTO: NEGATIVE
BLD GP AB SCN SERPL QL: NORMAL
CASTS URNS QL MICRO: ABNORMAL /LPF (ref 0–2)
COLOR UR: YELLOW
EOSINOPHIL # BLD AUTO: 0.12 K/UL (ref 0–0.51)
EOSINOPHIL NFR BLD: 1.2 % (ref 0–6.9)
EPITHELIAL CELLS 1715: ABNORMAL /HPF (ref 0–5)
ERYTHROCYTE [DISTWIDTH] IN BLOOD BY AUTOMATED COUNT: 40.1 FL (ref 35.9–50)
GLUCOSE UR STRIP.AUTO-MCNC: NEGATIVE MG/DL
HBV SURFACE AG SER QL: ABNORMAL
HCT VFR BLD AUTO: 41.9 % (ref 37–47)
HGB BLD-MCNC: 14 G/DL (ref 12–16)
HIV 1+2 AB+HIV1 P24 AG SERPL QL IA: NORMAL
IMM GRANULOCYTES # BLD AUTO: 0.04 K/UL (ref 0–0.11)
IMM GRANULOCYTES NFR BLD AUTO: 0.4 % (ref 0–0.9)
KETONES UR STRIP.AUTO-MCNC: ABNORMAL MG/DL
LEUKOCYTE ESTERASE UR QL STRIP.AUTO: ABNORMAL
LYMPHOCYTES # BLD AUTO: 2.82 K/UL (ref 1–4.8)
LYMPHOCYTES NFR BLD: 27.2 % (ref 22–41)
MCH RBC QN AUTO: 28.5 PG (ref 27–33)
MCHC RBC AUTO-ENTMCNC: 33.4 G/DL (ref 32.2–35.5)
MCV RBC AUTO: 85.3 FL (ref 81.4–97.8)
MICRO URNS: ABNORMAL
MONOCYTES # BLD AUTO: 0.61 K/UL (ref 0–0.85)
MONOCYTES NFR BLD AUTO: 5.9 % (ref 0–13.4)
NEUTROPHILS # BLD AUTO: 6.7 K/UL (ref 1.82–7.42)
NEUTROPHILS NFR BLD: 64.7 % (ref 44–72)
NITRITE UR QL STRIP.AUTO: NEGATIVE
NRBC # BLD AUTO: 0 K/UL
NRBC BLD-RTO: 0 /100 WBC (ref 0–0.2)
PH UR STRIP.AUTO: 5.5 [PH] (ref 5–8)
PLATELET # BLD AUTO: 363 K/UL (ref 164–446)
PMV BLD AUTO: 9.5 FL (ref 9–12.9)
PROT UR QL STRIP: NEGATIVE MG/DL
RBC # BLD AUTO: 4.91 M/UL (ref 4.2–5.4)
RBC # URNS HPF: ABNORMAL /HPF (ref 0–2)
RBC UR QL AUTO: ABNORMAL
RH BLD: NORMAL
RUBV AB SER QL: 11.4 IU/ML
SP GR UR STRIP.AUTO: 1.02
T PALLIDUM AB SER QL IA: ABNORMAL
UROBILINOGEN UR STRIP.AUTO-MCNC: 0.2 EU/DL
WBC # BLD AUTO: 10.4 K/UL (ref 4.8–10.8)
WBC #/AREA URNS HPF: ABNORMAL /HPF

## 2025-05-06 PROCEDURE — 87389 HIV-1 AG W/HIV-1&-2 AB AG IA: CPT

## 2025-05-06 PROCEDURE — 86850 RBC ANTIBODY SCREEN: CPT

## 2025-05-06 PROCEDURE — 81001 URINALYSIS AUTO W/SCOPE: CPT

## 2025-05-06 PROCEDURE — 86900 BLOOD TYPING SEROLOGIC ABO: CPT

## 2025-05-06 PROCEDURE — 86901 BLOOD TYPING SEROLOGIC RH(D): CPT

## 2025-05-06 PROCEDURE — 86592 SYPHILIS TEST NON-TREP QUAL: CPT

## 2025-05-06 PROCEDURE — 36415 COLL VENOUS BLD VENIPUNCTURE: CPT

## 2025-05-06 PROCEDURE — 87340 HEPATITIS B SURFACE AG IA: CPT

## 2025-05-06 PROCEDURE — 85025 COMPLETE CBC W/AUTO DIFF WBC: CPT

## 2025-05-06 PROCEDURE — 86762 RUBELLA ANTIBODY: CPT

## 2025-05-06 PROCEDURE — 86780 TREPONEMA PALLIDUM: CPT

## 2025-05-15 ENCOUNTER — HOSPITAL ENCOUNTER (OUTPATIENT)
Dept: LAB | Facility: MEDICAL CENTER | Age: 35
End: 2025-05-15
Attending: OBSTETRICS & GYNECOLOGY
Payer: COMMERCIAL

## 2025-05-15 PROCEDURE — 36415 COLL VENOUS BLD VENIPUNCTURE: CPT

## 2025-05-21 ENCOUNTER — HOSPITAL ENCOUNTER (OUTPATIENT)
Facility: MEDICAL CENTER | Age: 35
End: 2025-05-21
Attending: OBSTETRICS & GYNECOLOGY
Payer: COMMERCIAL

## 2025-05-21 ENCOUNTER — HOSPITAL ENCOUNTER (OUTPATIENT)
Facility: MEDICAL CENTER | Age: 35
End: 2025-05-21
Attending: STUDENT IN AN ORGANIZED HEALTH CARE EDUCATION/TRAINING PROGRAM | Admitting: STUDENT IN AN ORGANIZED HEALTH CARE EDUCATION/TRAINING PROGRAM
Payer: COMMERCIAL

## 2025-05-21 LAB
AMBIGUOUS DTTM AMBI4: NORMAL
APPEARANCE UR: ABNORMAL
BACTERIA #/AREA URNS HPF: ABNORMAL /HPF
BILIRUB UR QL STRIP.AUTO: NEGATIVE
CASTS URNS QL MICRO: ABNORMAL /LPF (ref 0–2)
COLOR UR: YELLOW
EPITHELIAL CELLS 1715: ABNORMAL /HPF (ref 0–5)
GLUCOSE UR STRIP.AUTO-MCNC: NEGATIVE MG/DL
GRANULAR CASTS  1716G: PRESENT /LPF
HYALINE CAST   1831: PRESENT /LPF
KETONES UR STRIP.AUTO-MCNC: 40 MG/DL
LEUKOCYTE ESTERASE UR QL STRIP.AUTO: ABNORMAL
MICRO URNS: ABNORMAL
NITRITE UR QL STRIP.AUTO: NEGATIVE
PH UR STRIP.AUTO: 5.5 [PH] (ref 5–8)
PROT UR QL STRIP: 30 MG/DL
RBC # URNS HPF: ABNORMAL /HPF (ref 0–2)
RBC UR QL AUTO: ABNORMAL
SP GR UR STRIP.AUTO: 1.02
UROBILINOGEN UR STRIP.AUTO-MCNC: 0.2 EU/DL
WBC #/AREA URNS HPF: ABNORMAL /HPF

## 2025-05-21 PROCEDURE — 81001 URINALYSIS AUTO W/SCOPE: CPT

## 2025-05-21 PROCEDURE — 87086 URINE CULTURE/COLONY COUNT: CPT

## 2025-05-22 ENCOUNTER — HOSPITAL ENCOUNTER (OUTPATIENT)
Facility: MEDICAL CENTER | Age: 35
End: 2025-05-22
Attending: OBSTETRICS & GYNECOLOGY
Payer: COMMERCIAL

## 2025-05-22 PROCEDURE — 87661 TRICHOMONAS VAGINALIS AMPLIF: CPT

## 2025-05-22 PROCEDURE — 87591 N.GONORRHOEAE DNA AMP PROB: CPT

## 2025-05-22 PROCEDURE — 87491 CHLMYD TRACH DNA AMP PROBE: CPT

## 2025-05-22 PROCEDURE — 88142 CYTOPATH C/V THIN LAYER: CPT

## 2025-05-22 NOTE — H&P
This version of the note has been redacted during the course of a chart correction case. If you need access to the original text of this version of the note, please contact the Health Information Management department at [phone].

## 2025-05-22 NOTE — H&P
Maternal Fetal Medicine Pre-operative H&P    Date of Admission: 25    Primary OB: Dr. Bryan    HPI: Lisa Whaley is a 34 y.o.  at 13.0 weeks (working DONNELL of 25). The patient has a notable history of cervical insufficiency with an exam indicated cerclage placed at 20 weeks in her prior pregnancy with Dr. Wang. On ultrasound in our clinic on 25 the patient's cervical length was noted to be 1.5 cm with evidence of funneling. The patient has remained asymptomatic with no evidence of infection, abruption, or labor. Normal bowel and urinary function reported.    ROS: 10 systems reviewed and negative except as noted in HPI    Pregnancy problem list:   1. Datin week ultrasound giving working DONNELL of 2025.  2. History of cervical insufficiency and previous pregnancy with rescue cerclage placement at 20 weeks gestation with Dr. Wang ([facility]) when she was found the be 3 cm dilated with prolapsing membranes. She went on to deliver at full term.   3. Cervical shortening in the current pregnancy to a CL of 1.5 cm with funneling.   4. Obesity (BMI 48).  5. AMA at delivery.   6. Genetic screening: NIPT drawn at 10w3d with no result due to low fetal fraction. Repeat NIPT screen (5/15) result currently pending.     OB History    Para Term  AB Living   3 1 1  1 1   SAB IAB Ectopic Molar Multiple Live Births       0 1      # Outcome Date GA Lbr Jeronimo/2nd Weight Sex Type Anes PTL Lv   3 Current            2 Term 23 40w3d / 02:25 3.765 kg (8 lb 4.8 oz) M Vag-Spont EPI N KEKE   1 AB      SAB        G1 TAB 13 weeks with D&E  G2 2023 - FT  male 8 pounds 4 ounces, exam indicated cerclage placed 3/27/2023 at 20 weeks, pt found to be 3 cm dilated with prolapsing membranes (Dr. Reyna).   G3 (C)    PMHx  BMI 48  AMA at delivery    PSHx  -Tonsillectomy at 17yo  -D&E  -Exam indicated cerclage placement in .    Family History   Problem Relation Age of Onset    No Known  Problems Mother     Heart Disease Father         MI    Hypertension Father     GI Disease Father         GERD    No Known Problems Sister     No Known Problems Maternal Grandmother     No Known Problems Maternal Grandfather     No Known Problems Paternal Grandmother     No Known Problems Paternal Grandfather     Cancer Neg Hx     Ovarian Cancer Neg Hx     Tubal Cancer Neg Hx     Peritoneal Cancer Neg Hx     Colorectal Cancer Neg Hx     Breast Cancer Neg Hx     Bilateral Breast Cancer Neg Hx        Social History[1]    Medications:   Medications Ordered Prior to Encounter[2]    Allergies:  Vicodin [hydrocodone-acetaminophen]    Objective:   Vitals: /60, Temp 97, HR 93 bpm, Resp 17, SpO2 96% on room air.     General:  Alert, conversational, pleasant, no acute distress  Lungs:  Normal work of breathing.  Heart:   Regular rate  Abdomen:  Soft, gravid, non-tender, non-distended  SVE:    Deferred    Doptones: 166 bpm at the bedside    Medications: Current Medications[3]     Labs:   Recent Results (from the past 24 hours)   HOLD BLOOD BANK SPECIMEN (NOT TESTED)    Collection Time: 05/23/25  3:58 PM   Result Value Ref Range    Holding Tube - Bb DONE    CBC WITH DIFFERENTIAL    Collection Time: 05/23/25  3:59 PM   Result Value Ref Range    WBC 15.6 (H) 4.8 - 10.8 K/uL    RBC 4.76 4.20 - 5.40 M/uL    Hemoglobin 13.7 12.0 - 16.0 g/dL    Hematocrit 40.9 37.0 - 47.0 %    MCV 85.9 81.4 - 97.8 fL    MCH 28.8 27.0 - 33.0 pg    MCHC 33.5 32.2 - 35.5 g/dL    RDW 38.5 35.9 - 50.0 fL    Platelet Count 365 164 - 446 K/uL    MPV 9.0 9.0 - 12.9 fL    Neutrophils-Polys 72.50 (H) 44.00 - 72.00 %    Lymphocytes 20.90 (L) 22.00 - 41.00 %    Monocytes 5.30 0.00 - 13.40 %    Eosinophils 0.40 0.00 - 6.90 %    Basophils 0.30 0.00 - 1.80 %    Immature Granulocytes 0.60 0.00 - 0.90 %    Nucleated RBC 0.00 0.00 - 0.20 /100 WBC    Neutrophils (Absolute) 11.28 (H) 1.82 - 7.42 K/uL    Lymphs (Absolute) 3.25 1.00 - 4.80 K/uL    Monos (Absolute)  0.83 0.00 - 0.85 K/uL    Eos (Absolute) 0.07 0.00 - 0.51 K/uL    Baso (Absolute) 0.05 0.00 - 0.12 K/uL    Immature Granulocytes (abs) 0.09 0.00 - 0.11 K/uL    NRBC (Absolute) 0.00 K/uL       Imaging: No results found.      A/P: 34 y.o.  at 13w0d presenting for ultrasound indicated cerclage placement. The patient reamins asymptomatic at this time. We have reviewed the following:    Cervical Shortening + History of exam indicated cerclage placement in .  I reviewed the patient's ultrasound findings from 25 in our clinic, which included shortening cervical length to 1.5 cm with evidence of funneling. The patient continues to be asymptomatic at this time, with very low suspicion for labor, abruption, or intraamniotic infection clinically. I reviewed with the patient that in the setting of her clinical history and shortened cervical length at this gestational age there is an increased risk for  labor,  rupture of membranes, and  delivery; regardless of her choice in management. I reviewed with the patient potential options for management including expectant management vs. treatment with vaginal progesterone vs. ultrasound indicated cerclage placement. The pros and cons of each options were reviewed with the patient. If electing for expectant management or vaginal progesterone treatment alone a repeat cervical length would be recommended later this week for reassessment. Regarding cerclage placement I reviewed the procedure in detail as well as the risks associated with the procedure which include, but are not limited to: Bleeding, infection, anesthesia-related complications, injury to the cervix, rupture of membranes, loss of the pregnancy, failure of the cerclage with possible  or periviable delivery. The patient expresses understanding that rupture of membranes or delivery at this early gestational age would not be compatible with life and that the developing fetus would  die.    -- After extensive counseling the patient wishes to proceed with ultrasound indicated Kelley cerclage placement.   -- She has been given instructions to be NPO at least 8 hours prior to surgery.   -- She has elected to start treatment with vaginal progesterone, which we'll plan to continue after cerclage placement.  -- Following cerclage placement the patient will be started on 48 hours of indomethacin 25 mg q6 hours for uterine quiescence. She has also been prescribed carafate 1 g q 6 hours to avoid GI upset. These medications have been prescribed to the patient's pharmacy.   -- The patient should be off of work at least 24 hours following the procedure.  -- Complete pelvic rest is recommend following the procedure for the remainder of pregnancy.   -- The patient will return to my office in the next 1-2 weeks for postoperative follow-up.  -- Plan for cerclage removal at 36-37 weeks, unless earlier removal becomes clinically indicated.     Joel Shelley MD            [1]   Social History  Tobacco Use    Smoking status: Former     Current packs/day: 0.10     Average packs/day: 0.1 packs/day for 1 year (0.1 ttl pk-yrs)     Types: Cigarettes     Passive exposure: Past    Smokeless tobacco: Never    Tobacco comments:     previously occas, 2 cig/day x 1 yr   Vaping Use    Vaping status: Never Used   Substance Use Topics    Alcohol use: Not Currently     Comment: about once a month    Drug use: Not Currently     Types: Marijuana     Comment: denies h/o IVDU   [2]   No current facility-administered medications on file prior to encounter.     Current Outpatient Medications on File Prior to Encounter   Medication Sig Dispense Refill    omeprazole (PRILOSEC) 20 MG delayed-release capsule Take 20 mg by mouth every day.      Prenatal MV-Min-Fe Fum-FA-DHA (PRENATAL 1 PO) Take  by mouth.     [3]   No current facility-administered medications for this encounter.     No current outpatient medications on file.

## 2025-05-23 ENCOUNTER — HOSPITAL ENCOUNTER (OUTPATIENT)
Facility: MEDICAL CENTER | Age: 35
End: 2025-05-23
Attending: OBSTETRICS & GYNECOLOGY | Admitting: OBSTETRICS & GYNECOLOGY
Payer: COMMERCIAL

## 2025-05-23 ENCOUNTER — ANESTHESIA (OUTPATIENT)
Dept: OBGYN | Facility: MEDICAL CENTER | Age: 35
End: 2025-05-23
Payer: COMMERCIAL

## 2025-05-23 ENCOUNTER — ANESTHESIA EVENT (OUTPATIENT)
Dept: OBGYN | Facility: MEDICAL CENTER | Age: 35
End: 2025-05-23
Payer: COMMERCIAL

## 2025-05-23 VITALS
OXYGEN SATURATION: 98 % | HEIGHT: 66 IN | SYSTOLIC BLOOD PRESSURE: 125 MMHG | HEART RATE: 77 BPM | TEMPERATURE: 96.5 F | DIASTOLIC BLOOD PRESSURE: 69 MMHG | RESPIRATION RATE: 17 BRPM | WEIGHT: 293 LBS | BODY MASS INDEX: 47.09 KG/M2

## 2025-05-23 LAB
BASOPHILS # BLD AUTO: 0.3 % (ref 0–1.8)
BASOPHILS # BLD: 0.05 K/UL (ref 0–0.12)
EOSINOPHIL # BLD AUTO: 0.07 K/UL (ref 0–0.51)
EOSINOPHIL NFR BLD: 0.4 % (ref 0–6.9)
ERYTHROCYTE [DISTWIDTH] IN BLOOD BY AUTOMATED COUNT: 38.5 FL (ref 35.9–50)
HCT VFR BLD AUTO: 40.9 % (ref 37–47)
HGB BLD-MCNC: 13.7 G/DL (ref 12–16)
HOLDING TUBE BB 8507: NORMAL
IMM GRANULOCYTES # BLD AUTO: 0.09 K/UL (ref 0–0.11)
IMM GRANULOCYTES NFR BLD AUTO: 0.6 % (ref 0–0.9)
LYMPHOCYTES # BLD AUTO: 3.25 K/UL (ref 1–4.8)
LYMPHOCYTES NFR BLD: 20.9 % (ref 22–41)
MCH RBC QN AUTO: 28.8 PG (ref 27–33)
MCHC RBC AUTO-ENTMCNC: 33.5 G/DL (ref 32.2–35.5)
MCV RBC AUTO: 85.9 FL (ref 81.4–97.8)
MONOCYTES # BLD AUTO: 0.83 K/UL (ref 0–0.85)
MONOCYTES NFR BLD AUTO: 5.3 % (ref 0–13.4)
NEUTROPHILS # BLD AUTO: 11.28 K/UL (ref 1.82–7.42)
NEUTROPHILS NFR BLD: 72.5 % (ref 44–72)
NRBC # BLD AUTO: 0 K/UL
NRBC BLD-RTO: 0 /100 WBC (ref 0–0.2)
PLATELET # BLD AUTO: 365 K/UL (ref 164–446)
PMV BLD AUTO: 9 FL (ref 9–12.9)
RBC # BLD AUTO: 4.76 M/UL (ref 4.2–5.4)
WBC # BLD AUTO: 15.6 K/UL (ref 4.8–10.8)

## 2025-05-23 PROCEDURE — 700102 HCHG RX REV CODE 250 W/ 637 OVERRIDE(OP): Performed by: ANESTHESIOLOGY

## 2025-05-23 PROCEDURE — 700111 HCHG RX REV CODE 636 W/ 250 OVERRIDE (IP): Performed by: ANESTHESIOLOGY

## 2025-05-23 PROCEDURE — 36415 COLL VENOUS BLD VENIPUNCTURE: CPT

## 2025-05-23 PROCEDURE — 160009 HCHG ANES TIME/MIN: Performed by: STUDENT IN AN ORGANIZED HEALTH CARE EDUCATION/TRAINING PROGRAM

## 2025-05-23 PROCEDURE — 160048 HCHG OR STATISTICAL LEVEL 1-5: Performed by: STUDENT IN AN ORGANIZED HEALTH CARE EDUCATION/TRAINING PROGRAM

## 2025-05-23 PROCEDURE — 160035 HCHG PACU - 1ST 60 MINS PHASE I: Performed by: STUDENT IN AN ORGANIZED HEALTH CARE EDUCATION/TRAINING PROGRAM

## 2025-05-23 PROCEDURE — 85025 COMPLETE CBC W/AUTO DIFF WBC: CPT

## 2025-05-23 PROCEDURE — 700105 HCHG RX REV CODE 258: Performed by: ANESTHESIOLOGY

## 2025-05-23 PROCEDURE — 160039 HCHG SURGERY MINUTES - EA ADDL 1 MIN LEVEL 3: Performed by: STUDENT IN AN ORGANIZED HEALTH CARE EDUCATION/TRAINING PROGRAM

## 2025-05-23 PROCEDURE — 160002 HCHG RECOVERY MINUTES (STAT): Performed by: STUDENT IN AN ORGANIZED HEALTH CARE EDUCATION/TRAINING PROGRAM

## 2025-05-23 PROCEDURE — 160028 HCHG SURGERY MINUTES - 1ST 30 MINS LEVEL 3: Performed by: STUDENT IN AN ORGANIZED HEALTH CARE EDUCATION/TRAINING PROGRAM

## 2025-05-23 PROCEDURE — 700111 HCHG RX REV CODE 636 W/ 250 OVERRIDE (IP): Mod: JZ

## 2025-05-23 PROCEDURE — 700105 HCHG RX REV CODE 258: Performed by: STUDENT IN AN ORGANIZED HEALTH CARE EDUCATION/TRAINING PROGRAM

## 2025-05-23 PROCEDURE — A9270 NON-COVERED ITEM OR SERVICE: HCPCS | Performed by: ANESTHESIOLOGY

## 2025-05-23 PROCEDURE — C1755 CATHETER, INTRASPINAL: HCPCS | Performed by: STUDENT IN AN ORGANIZED HEALTH CARE EDUCATION/TRAINING PROGRAM

## 2025-05-23 PROCEDURE — 160015 HCHG STAT PREOP MINUTES: Performed by: STUDENT IN AN ORGANIZED HEALTH CARE EDUCATION/TRAINING PROGRAM

## 2025-05-23 PROCEDURE — 302790 HCHG STAT ANTEPARTUM CARE, DAILY

## 2025-05-23 RX ORDER — CHLOROPROCAINE HYDROCHLORIDE 30 MG/ML
INJECTION, SOLUTION EPIDURAL; INFILTRATION; INTRACAUDAL; PERINEURAL PRN
Status: DISCONTINUED | OUTPATIENT
Start: 2025-05-23 | End: 2025-05-23 | Stop reason: SURG

## 2025-05-23 RX ORDER — ALBUTEROL SULFATE 5 MG/ML
2.5 SOLUTION RESPIRATORY (INHALATION)
Status: DISCONTINUED | OUTPATIENT
Start: 2025-05-23 | End: 2025-05-24 | Stop reason: HOSPADM

## 2025-05-23 RX ORDER — ONDANSETRON 2 MG/ML
INJECTION INTRAMUSCULAR; INTRAVENOUS
Status: COMPLETED
Start: 2025-05-23 | End: 2025-05-23

## 2025-05-23 RX ORDER — EPHEDRINE SULFATE 50 MG/ML
5 INJECTION, SOLUTION INTRAVENOUS
Status: DISCONTINUED | OUTPATIENT
Start: 2025-05-23 | End: 2025-05-24 | Stop reason: HOSPADM

## 2025-05-23 RX ORDER — SODIUM CHLORIDE, SODIUM GLUCONATE, SODIUM ACETATE, POTASSIUM CHLORIDE AND MAGNESIUM CHLORIDE 526; 502; 368; 37; 30 MG/100ML; MG/100ML; MG/100ML; MG/100ML; MG/100ML
1000 INJECTION, SOLUTION INTRAVENOUS ONCE
Status: COMPLETED | OUTPATIENT
Start: 2025-05-23 | End: 2025-05-23

## 2025-05-23 RX ORDER — HYDRALAZINE HYDROCHLORIDE 20 MG/ML
5 INJECTION INTRAMUSCULAR; INTRAVENOUS
Status: DISCONTINUED | OUTPATIENT
Start: 2025-05-23 | End: 2025-05-24 | Stop reason: HOSPADM

## 2025-05-23 RX ORDER — OXYCODONE HCL 5 MG/5 ML
5 SOLUTION, ORAL ORAL
Status: DISCONTINUED | OUTPATIENT
Start: 2025-05-23 | End: 2025-05-24 | Stop reason: HOSPADM

## 2025-05-23 RX ORDER — OXYCODONE HCL 5 MG/5 ML
10 SOLUTION, ORAL ORAL
Status: DISCONTINUED | OUTPATIENT
Start: 2025-05-23 | End: 2025-05-24 | Stop reason: HOSPADM

## 2025-05-23 RX ORDER — CITRIC ACID/SODIUM CITRATE 334-500MG
30 SOLUTION, ORAL ORAL ONCE
Status: COMPLETED | OUTPATIENT
Start: 2025-05-23 | End: 2025-05-23

## 2025-05-23 RX ORDER — SODIUM CHLORIDE, SODIUM GLUCONATE, SODIUM ACETATE, POTASSIUM CHLORIDE AND MAGNESIUM CHLORIDE 526; 502; 368; 37; 30 MG/100ML; MG/100ML; MG/100ML; MG/100ML; MG/100ML
INJECTION, SOLUTION INTRAVENOUS
Status: DISCONTINUED | OUTPATIENT
Start: 2025-05-23 | End: 2025-05-23 | Stop reason: SURG

## 2025-05-23 RX ORDER — HALOPERIDOL 5 MG/ML
1 INJECTION INTRAMUSCULAR
Status: DISCONTINUED | OUTPATIENT
Start: 2025-05-23 | End: 2025-05-24 | Stop reason: HOSPADM

## 2025-05-23 RX ORDER — PROGESTERONE 100 MG/1
100 CAPSULE ORAL DAILY
COMMUNITY

## 2025-05-23 RX ORDER — LABETALOL HYDROCHLORIDE 5 MG/ML
5 INJECTION, SOLUTION INTRAVENOUS
Status: DISCONTINUED | OUTPATIENT
Start: 2025-05-23 | End: 2025-05-24 | Stop reason: HOSPADM

## 2025-05-23 RX ORDER — DIPHENHYDRAMINE HYDROCHLORIDE 50 MG/ML
12.5 INJECTION, SOLUTION INTRAMUSCULAR; INTRAVENOUS
Status: DISCONTINUED | OUTPATIENT
Start: 2025-05-23 | End: 2025-05-24 | Stop reason: HOSPADM

## 2025-05-23 RX ORDER — ONDANSETRON 2 MG/ML
4 INJECTION INTRAMUSCULAR; INTRAVENOUS
Status: COMPLETED | OUTPATIENT
Start: 2025-05-23 | End: 2025-05-23

## 2025-05-23 RX ORDER — METOCLOPRAMIDE HYDROCHLORIDE 5 MG/ML
10 INJECTION INTRAMUSCULAR; INTRAVENOUS ONCE
Status: COMPLETED | OUTPATIENT
Start: 2025-05-23 | End: 2025-05-23

## 2025-05-23 RX ADMIN — FAMOTIDINE 20 MG: 10 INJECTION, SOLUTION INTRAVENOUS at 17:57

## 2025-05-23 RX ADMIN — SODIUM CHLORIDE, SODIUM GLUCONATE, SODIUM ACETATE, POTASSIUM CHLORIDE AND MAGNESIUM CHLORIDE 1000 ML: 526; 502; 368; 37; 30 INJECTION, SOLUTION INTRAVENOUS at 17:12

## 2025-05-23 RX ADMIN — ONDANSETRON 4 MG: 2 INJECTION INTRAMUSCULAR; INTRAVENOUS at 19:42

## 2025-05-23 RX ADMIN — CHLOROPROCAINE HYDROCHLORIDE 1.6 ML: 30 INJECTION, SOLUTION EPIDURAL; INFILTRATION; INTRACAUDAL; PERINEURAL at 18:38

## 2025-05-23 RX ADMIN — METOCLOPRAMIDE 10 MG: 5 INJECTION, SOLUTION INTRAMUSCULAR; INTRAVENOUS at 17:56

## 2025-05-23 RX ADMIN — SODIUM CITRATE AND CITRIC ACID MONOHYDRATE 30 ML: 2004; 3000 SOLUTION ORAL at 17:57

## 2025-05-23 RX ADMIN — SODIUM CHLORIDE, SODIUM GLUCONATE, SODIUM ACETATE, POTASSIUM CHLORIDE AND MAGNESIUM CHLORIDE: 526; 502; 368; 37; 30 INJECTION, SOLUTION INTRAVENOUS at 18:09

## 2025-05-23 ASSESSMENT — FIBROSIS 4 INDEX: FIB4 SCORE: 0.35

## 2025-05-23 ASSESSMENT — PAIN SCALES - GENERAL: PAIN_LEVEL: 0

## 2025-05-24 LAB
BACTERIA UR CULT: NORMAL
C TRACH DNA GENITAL QL NAA+PROBE: NEGATIVE
N GONORRHOEA DNA GENITAL QL NAA+PROBE: NEGATIVE
SIGNIFICANT IND 70042: NORMAL
SITE SITE: NORMAL
SOURCE SOURCE: NORMAL
SPECIMEN SOURCE: NORMAL

## 2025-05-24 NOTE — OP REPORT
OPERATIVE REPORT    DATE OF SERVICE:  25     PREOPERATIVE DIAGNOSES:  1.  Intrauterine pregnancy at 13w0d  2.  History of cervical insufficiency with exam indicated cerclage placement in prior pregnancy.   3. Cervical shortening in current pregnancy to a CL of 1.5 cm with funneling.     POSTOPERATIVE DIAGNOSES:  1.  Intrauterine pregnancy at 13w0d  2-3.  same as above s/p the below stated procedure.    PROCEDURE PERFORMED:  Ultrasound indicated Kelley Cerclage     SURGEON:  Joel Shelley MD     ASSISTANT: None     ANESTHESIA:  Spinal.     ANESTHESIOLOGIST:  Dr. Dieter MD     SPECIMEN:  None     ESTIMATED BLOOD LOSS:  Minimal     FINDINGS:  13 week size uterus with confirmed live intrauterine pregnancy prior to the procedure. Cervical exam closed prior to the procedure.     COMPLICATIONS:  None.    INDICATIONS FOR THE PROCEDURE: The patient is a 34 year-old  at 13 weeks 0 days with a history of cervical insufficiency requiring exam indicated cerclage placement in prior pregnancy. In the current pregnancy the patient's cervical length was incidentally found to be 1.5 cm with funneling at 12 weeks prompting recommendation for ultrasound indicated cerclage placement.      PROCEDURE:  After appropriate consents were obtained, the patient was taken to the operating room where spinal anesthesia was given without complications.  The patient was then prepped and draped in the usual sterile manner in the dorsal lithotomy position. A time-out was performed to confirm the correct patient and correct procedure. A bimanual exam was performed with the above findings. A straight catheter was inserted into the bladder and 20 mL of clear yellow urine was obtained. Retractors were inserted into the vagina and the cervix was visualized. The anterior and posterior lips of the cervix were visualized and grasped with ring forceps. 5 mm Mersilene tape was used to take a bite in the body of the cervix at the 12 o'clock  position as close as possible to the junction of the rugated vaginal epithelium exiting at the 10 o'clock position. Successive bites were taken from the 10 to 8 o'clock position, 8-6 o'clock position, 6-4 o'clock position, 4-2 o'clock position, and 2-12 o'clock position. The purse-string suture was tightened and tied 8 times. The knot was noted to be at the 12 o'clock position.     The speculum was removed from the vagina and a bimanual exam was performed. The cervix was noted to be closed with 2 cm of cervix palpable distal to the cerclage and the cerclage knot palpable at the 12 o'clock position. All needle, sponge, and instrument counts were noted to be correct x 2 at the completion of the procedure. The patient tolerated the procedure well and was transferred to the recovery room in stable condition.           ____________________________________     Eric Shelley M.D.

## 2025-05-24 NOTE — ANESTHESIA PROCEDURE NOTES
Spinal Block    Date/Time: 5/23/2025 6:38 PM    Performed by: Janette Garcias M.D.  Authorized by: Janette Garcias M.D.    Patient Location:  OR  Start Time:  5/23/2025 6:38 PM  Reason for Block: primary anesthetic    patient identified, IV checked, site marked, risks and benefits discussed, surgical consent, monitors and equipment checked, pre-op evaluation and timeout performed    Patient Position:  Sitting  Prep: ChloraPrep, patient draped and sterile technique    Monitoring:  Blood pressure, continuous pulse oximetry and heart rate  Approach:  Midline  Location:  L3-4  Injection Technique:  Single-shot  Skin infiltration:  Lidocaine  Strength:  1%  Dose:  3ml  Needle Type:  Pencan  Needle Gauge:  25 G  CSF flowing pre/post injection:  Yes  Sensory Level:  T4   Difficult spinal placement  Used dione as introducer for 25G spinal needle

## 2025-05-24 NOTE — ANESTHESIA PREPROCEDURE EVALUATION
Case: [id number] Date/Time: 05/23/25 5031    Procedure: CERCLAGE    Pre-op diagnosis: INCOMPETENT CERVIX    Location: [facility] OR 01 / [facility] DELIVERY    Surgeons: Eric Shelley M.D.            Relevant Problems   No relevant active problems       Physical Exam    Airway   Mallampati: II  TM distance: >3 FB  Neck ROM: full       Cardiovascular - normal exam   Dental    Pulmonary Breath sounds clear to auscultation     Abdominal (+) obese     Neurological - normal exam                   Anesthesia Plan    ASA 3   ASA physical status 3 criteria: morbid obesity - BMI greater than or equal to 40    Plan - spinal   Neuraxial block will be primary anesthetic                  Pertinent diagnostic labs and testing reviewed    Informed Consent:    Anesthetic plan and risks discussed with patient.

## 2025-05-24 NOTE — PROGRESS NOTES
34 y.o.  EDC  (13 wks)    Pt presents to L&D for a scheduled cervical cerclage.Reports good FM. Denies VB/LOF/ctxs. VS taken. Unable to doppler pt with EFM.    : Dr. Shelley notified of pt's arrival and current status, orders received. MD will come to bedside before procedure to doppler baby with US.    : Dr. Shelley at bedside. [facility] Doppler 166 with US.    : Pt ambulated from S2 to OR 3 in stable condition.    : Pt transported via gurney from OR 3 back to S236. Report received from anesthesia.    : Dr. Shelley at bedside. Pt may discharge home after able to tolerate food and able to ambulate and void within 6 hours.    : Pt assisted up to the bathroom. Pt walking with steady gait and able to successfully void. Pt denies any pain or nausea at this time. Pt desires to discharge home.    : Dr. Bryan at bedside. [facility] Doppler 170.    : Patient discharged home with specific instruction to return to L&D/Physician ie.. Bleeding/ROM/decreased FM/labor/concerns for self or baby.  Patient denies questions or concerns regarding POC since arrival and POC to discharge home.  Patient taken out of the hospital by wheelchair by FOB.

## 2025-05-24 NOTE — ANESTHESIA POSTPROCEDURE EVALUATION
Patient: Lisa Whaley    Procedure Summary       Date: 05/23/25 Room / Location: [facility] OR 01 / [facility]    Anesthesia Start: 1809 Anesthesia Stop: 1913    Procedure: CERCLAGE Diagnosis: (INCOMPETENT CERVIX)    Surgeons: Eric Shelley M.D. Responsible Provider: Janette Garcias M.D.    Anesthesia Type: spinal ASA Status: 3            Final Anesthesia Type: spinal  Last vitals  BP   Blood Pressure: 128/60    Temp   36.1 °C (97 °F)    Pulse   88   Resp   17    SpO2   96 %      Anesthesia Post Evaluation    Patient location during evaluation: bedside  Patient participation: complete - patient participated  Level of consciousness: awake and alert  Pain score: 0    Airway patency: patent  Anesthetic complications: no  Cardiovascular status: hemodynamically stable  Respiratory status: acceptable  Hydration status: euvolemic    PONV: none          No notable events documented.     Nurse Pain Score: 0 (NPRS)

## 2025-05-24 NOTE — ANESTHESIA TIME REPORT
Anesthesia Start and Stop Event Times       Date Time Event    5/23/2025 1750 Ready for Procedure     1809 Anesthesia Start     1913 Anesthesia Stop          Responsible Staff  05/23/25      Name Role Begin End    Janette Garcias M.D. Anesth 1809 1913          Overtime Reason:  no overtime (within assigned shift)    Comments:

## 2025-05-27 LAB
SPEC CONTAINER SPEC: NORMAL
SPECIMEN SOURCE: NORMAL
T VAGINALIS RRNA SPEC QL NAA+PROBE: NEGATIVE

## 2025-08-21 ENCOUNTER — HOSPITAL ENCOUNTER (OUTPATIENT)
Dept: LAB | Facility: MEDICAL CENTER | Age: 35
End: 2025-08-21
Attending: OBSTETRICS & GYNECOLOGY
Payer: COMMERCIAL

## 2025-08-21 LAB
BASOPHILS # BLD AUTO: 0.4 % (ref 0–1.8)
BASOPHILS # BLD: 0.06 K/UL (ref 0–0.12)
EOSINOPHIL # BLD AUTO: 0.1 K/UL (ref 0–0.51)
EOSINOPHIL NFR BLD: 0.6 % (ref 0–6.9)
ERYTHROCYTE [DISTWIDTH] IN BLOOD BY AUTOMATED COUNT: 43 FL (ref 35.9–50)
GLUCOSE 1H P 50 G GLC PO SERPL-MCNC: 161 MG/DL (ref 70–139)
HCT VFR BLD AUTO: 37.1 % (ref 37–47)
HGB BLD-MCNC: 12.6 G/DL (ref 12–16)
IMM GRANULOCYTES # BLD AUTO: 0.15 K/UL (ref 0–0.11)
IMM GRANULOCYTES NFR BLD AUTO: 0.9 % (ref 0–0.9)
LYMPHOCYTES # BLD AUTO: 2.7 K/UL (ref 1–4.8)
LYMPHOCYTES NFR BLD: 16.9 % (ref 22–41)
MCH RBC QN AUTO: 29.9 PG (ref 27–33)
MCHC RBC AUTO-ENTMCNC: 34 G/DL (ref 32.2–35.5)
MCV RBC AUTO: 87.9 FL (ref 81.4–97.8)
MONOCYTES # BLD AUTO: 0.63 K/UL (ref 0–0.85)
MONOCYTES NFR BLD AUTO: 3.9 % (ref 0–13.4)
NEUTROPHILS # BLD AUTO: 12.31 K/UL (ref 1.82–7.42)
NEUTROPHILS NFR BLD: 77.3 % (ref 44–72)
NRBC # BLD AUTO: 0 K/UL
NRBC BLD-RTO: 0 /100 WBC (ref 0–0.2)
PLATELET # BLD AUTO: 280 K/UL (ref 164–446)
PMV BLD AUTO: 9.6 FL (ref 9–12.9)
RBC # BLD AUTO: 4.22 M/UL (ref 4.2–5.4)
T PALLIDUM AB SER QL IA: NORMAL
WBC # BLD AUTO: 16 K/UL (ref 4.8–10.8)

## 2025-08-21 PROCEDURE — 85025 COMPLETE CBC W/AUTO DIFF WBC: CPT

## 2025-08-21 PROCEDURE — 82105 ALPHA-FETOPROTEIN SERUM: CPT

## 2025-08-21 PROCEDURE — 86780 TREPONEMA PALLIDUM: CPT

## 2025-08-21 PROCEDURE — 36415 COLL VENOUS BLD VENIPUNCTURE: CPT

## 2025-08-21 PROCEDURE — 82950 GLUCOSE TEST: CPT

## 2025-08-26 LAB
# FETUSES US: ABNORMAL
AFP MOM SERPL: 2.62
AFP SERPL-MCNC: 66 NG/ML
AGE - REPORTED: 35.3 YR
CURRENT SMOKER: NO
FAMILY MEMBER DISEASES HX: NO
GA METHOD: ABNORMAL
GA: ABNORMAL WK
IDDM PATIENT QL: NO
INTEGRATED SCN PATIENT-IMP: ABNORMAL
SPECIMEN DRAWN SERPL: ABNORMAL

## (undated) DEVICE — PACK ROOM TURNOVER L&D (12/CA)

## (undated) DEVICE — SUCTION INSTRUMENT YANKAUER BULBOUS TIP W/O VENT (50EA/CA)

## (undated) DEVICE — SUCTION INSTRUMENT YANKAUER OPEN TIP W/O VENT (50EA/CA)

## (undated) DEVICE — CANISTER SUCTION 1200 CC MECHANICAL FILTER AUTO SHUT OFF MEDI-VAC STERILE LF DISP - (40EA/CA)

## (undated) DEVICE — TRAY SPINAL ANESTHESIA NON-SAFETY (20/CA)

## (undated) DEVICE — SCRUB SOLUTION EXIDINE 4% 40Z - 48/CS CHLORAHEXADINE GLUCONATE

## (undated) DEVICE — WATER IRRIGATION STERILE 1000ML (12EA/CA)

## (undated) DEVICE — SOLUTION PLASMA-LYTE PH 7.4 INJ 1000ML (14EA/CA)

## (undated) DEVICE — PACK VAGINAL FOR L&D (4EA/CA)

## (undated) DEVICE — KIT  I.V. START (100EA/CA)

## (undated) DEVICE — SUTURE 5-0 MERSILENE MO-4 (6EA/BX)

## (undated) DEVICE — SET EXTENSION WITH 2 PORTS (48EA/CA) ***PART #2C8610 IS A SUBSTITUTE*****

## (undated) DEVICE — SUTURE 0 SILK 12 X 18 (36PK/BX)

## (undated) DEVICE — GLOVE BIOGEL PI ORTHO SZ 7 PF LF (40PR/BX)

## (undated) DEVICE — GLOVE BIOGEL SZ 7.5 SURGICAL PF LTX - (50PR/BX 4BX/CA)

## (undated) DEVICE — SOLUTION 10%PVP-IODINE 8OZ - (24/CA)

## (undated) DEVICE — HEAD HOLDER JUNIOR/ADULT

## (undated) DEVICE — CATHETER IV NON-SAFETY 18 GA X 1 1/4 (50/BX 4BX/CA)

## (undated) DEVICE — BAG SPONGE COUNT 10.25 X 32 - BLUE (250/CA)

## (undated) DEVICE — TRAY SKIN SCRUB PVP WET (20EA/CA) PART #DYND70356 DISCONTINUED

## (undated) DEVICE — SUTURE 5MM MERSILENE WHITE BP D/A 12 (6PK/BX)"

## (undated) DEVICE — CANISTER SUCTION 3000ML MECHANICAL FILTER AUTO SHUTOFF MEDI-VAC NONSTERILE LF DISP  (40EA/CA)

## (undated) DEVICE — TUBING CLEARLINK DUO-VENT - C-FLO (48EA/CA)

## (undated) DEVICE — CANNULA O2 COMFORT SOFT EAR ADULT 7 FT TUBING (50/CA)